# Patient Record
Sex: MALE
[De-identification: names, ages, dates, MRNs, and addresses within clinical notes are randomized per-mention and may not be internally consistent; named-entity substitution may affect disease eponyms.]

---

## 2019-02-05 ENCOUNTER — HOSPITAL ENCOUNTER (OUTPATIENT)
Dept: HOSPITAL 88 - CT | Age: 74
End: 2019-02-05
Attending: NURSE PRACTITIONER
Payer: COMMERCIAL

## 2019-02-05 DIAGNOSIS — J34.2: ICD-10-CM

## 2019-02-05 DIAGNOSIS — J32.0: Primary | ICD-10-CM

## 2019-02-05 PROCEDURE — 70486 CT MAXILLOFACIAL W/O DYE: CPT

## 2019-02-05 NOTE — DIAGNOSTIC IMAGING REPORT
History: Maxillary sinusitis

Comparison studies: None



Technique: 

Axial images were obtained through the paranasal sinuses.

Coronal and sagittal images reconstructed from the axial data.

Radiation dose: Total DLP: 295 mGy*cm. Estimated effective dose: DLP x 0.015 

Intravenous contrast: None



Findings:



Right anterior complex:

Frontal sinus: Clear aside from mild mucosal thickening in the anteroinferior

sinus.

Frontonasal recess: Patent.

Anterior ethmoid air cells: Clear.

Ostiomeatal unit: Maxillary ostium is narrowed by mucosal thickening but is

patent. The ethmoid infundibulum and hiatus semilunaris are likewise patent.

Maxillary sinus: Partially opacified with peripheral mucosal thickening,

greatest along the alveolar recess.



Left anterior complex:

Frontal sinus: Clear.

Frontonasal recess: Patent.

Anterior ethmoid air cells: Mild mucosal thickening otherwise clear.

Ostiomeatal unit: Maxillary ostium narrowed by mucosal thickening but patent.

The ethmoid infundibulum and hiatus semilunaris are likely patent.

Maxillary sinus: Peripheral mucosal thickening predominantly along the alveolar

recess.



Posterior complex:

Left sphenoid sinus: Clear.

Right sphenoid sinus: Contains small nonspecific frothy secretions or debris. 

Clear. Sphenoethmoidal recesses: Clear.

Posterior ethmoid air cells: Clear.





Other: 

Nasal vestibule and cavity: Patent

Nasal septum: Deviated to the patient's right.

Agger Nasi: Clear bilaterally.

Turbinates:  Non-aerated bilaterally.

Manny cells: None 

Lamina papyracea: Intact.

Cribriform plates: Approximate 3 to 4 mm on the right and 4 to 5 mm on the left

below the level of the fovea ethmoidalis.

Olfactory recesses: Clear

Optic canals: Not dehiscent

Onodi cells: None

Internal carotid canals: Not dehiscent. Both carotid canal slightly bulge into

the left sphenoid sinus with air covered by thin bony plates.

Sphenoid sinuses: Asymmetric with dominant left sinus. The lateral recesses are

not aerated. The septum inserts to the right of midline and inserts along the

anterior wall the right cavernous carotid canal.



Orbits: No abnormalities.

Bones: No fractures or lytic or blastic lesions.

Temporal bones: No gross abnormalities.



Dentition: Edentulous maxilla. Multiple absent mandibular teeth with mandibular

dental caries



Included brain: Mild generalized cerebral volume loss with nonspecific

periventricular hypodensities which may reflect chronic microvascular ischemic

changes. Scattered calcified atherosclerosis in the carotid siphons, and

dominant right intradural vertebral artery and in the basilar artery with mild

vertebrobasilar dolichoectasia.



Incidental findings: 

Lens replacements for previous cataract surgery.

Calcified atherosclerosis in the proximal left cervical internal carotid

artery.



IMPRESSION:



1.  Nonspecific inflammatory mucosal thickening in the maxillary sinuses, right

inferior frontal sinus and left anterior ethmoids with small nonspecific debris

or secretions in the right sphenoid sinus.

2.  Sinus drainage pathways are patent.

3.  Rightward nasal septal deviation.



Signed by: Dr. Erick Victoria M.D. on 2/5/2019 3:02 PM

## 2019-11-25 ENCOUNTER — HOSPITAL ENCOUNTER (OUTPATIENT)
Dept: HOSPITAL 88 - PT | Age: 74
LOS: 5 days | End: 2019-11-30
Attending: INTERNAL MEDICINE
Payer: MEDICARE

## 2019-11-25 DIAGNOSIS — I69.898: Primary | ICD-10-CM

## 2019-11-25 DIAGNOSIS — R26.89: ICD-10-CM

## 2020-09-23 ENCOUNTER — HOSPITAL ENCOUNTER (INPATIENT)
Dept: HOSPITAL 88 - ER | Age: 75
LOS: 5 days | Discharge: HOME HEALTH | DRG: 643 | End: 2020-09-28
Attending: INTERNAL MEDICINE | Admitting: INTERNAL MEDICINE
Payer: MEDICARE

## 2020-09-23 VITALS — HEIGHT: 67 IN | BODY MASS INDEX: 28.25 KG/M2 | WEIGHT: 180 LBS

## 2020-09-23 VITALS — DIASTOLIC BLOOD PRESSURE: 90 MMHG | SYSTOLIC BLOOD PRESSURE: 183 MMHG

## 2020-09-23 VITALS — SYSTOLIC BLOOD PRESSURE: 183 MMHG | DIASTOLIC BLOOD PRESSURE: 90 MMHG

## 2020-09-23 DIAGNOSIS — J84.10: ICD-10-CM
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
## 2020-09-23 DIAGNOSIS — S06.0X0A: ICD-10-CM

## 2020-09-23 DIAGNOSIS — I10: ICD-10-CM

## 2020-09-23 DIAGNOSIS — F01.50: ICD-10-CM
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
## 2020-09-23 DIAGNOSIS — S00.83XA: ICD-10-CM

## 2020-09-23 DIAGNOSIS — M25.561: ICD-10-CM

## 2020-09-23 DIAGNOSIS — S00.81XA: ICD-10-CM

## 2020-09-23 DIAGNOSIS — M25.531: ICD-10-CM

## 2020-09-23 DIAGNOSIS — M25.562: ICD-10-CM

## 2020-09-23 DIAGNOSIS — E22.2: Primary | ICD-10-CM

## 2020-09-23 DIAGNOSIS — J18.9: ICD-10-CM

## 2020-09-23 LAB
ALBUMIN SERPL-MCNC: 4.4 G/DL (ref 3.5–5)
ALBUMIN/GLOB SERPL: 1.5 {RATIO} (ref 0.8–2)
ALP SERPL-CCNC: 68 IU/L (ref 40–150)
ALT SERPL-CCNC: 9 IU/L (ref 0–55)
ANION GAP SERPL CALC-SCNC: 12.5 MMOL/L (ref 8–16)
BACTERIA URNS QL MICRO: (no result) /HPF
BASOPHILS # BLD AUTO: 0.1 10*3/UL (ref 0–0.1)
BASOPHILS NFR BLD AUTO: 1.3 % (ref 0–1)
BILIRUB UR QL: NEGATIVE
BUN SERPL-MCNC: 9 MG/DL (ref 7–26)
BUN/CREAT SERPL: 12 (ref 6–25)
CALCIUM SERPL-MCNC: 8.3 MG/DL (ref 8.4–10.2)
CHLORIDE SERPL-SCNC: 92 MMOL/L (ref 98–107)
CK MB SERPL-MCNC: 4.7 NG/ML (ref 0–5)
CK SERPL-CCNC: 270 IU/L (ref 30–200)
CLARITY UR: (no result)
CO2 SERPL-SCNC: 22 MMOL/L (ref 22–29)
COLOR UR: YELLOW
DEPRECATED NEUTROPHILS # BLD AUTO: 3.2 10*3/UL (ref 2.1–6.9)
DEPRECATED RBC URNS MANUAL-ACNC: (no result) /HPF (ref 0–5)
EGFRCR SERPLBLD CKD-EPI 2021: > 60 ML/MIN (ref 60–?)
EOSINOPHIL # BLD AUTO: 0.1 10*3/UL (ref 0–0.4)
EOSINOPHIL NFR BLD AUTO: 2.5 % (ref 0–6)
EPI CELLS URNS QL MICRO: (no result) /LPF
ERYTHROCYTE [DISTWIDTH] IN CORD BLOOD: 12.1 % (ref 11.7–14.4)
GLOBULIN PLAS-MCNC: 3 G/DL (ref 2.3–3.5)
GLUCOSE SERPLBLD-MCNC: 102 MG/DL (ref 74–118)
HCT VFR BLD AUTO: 35.8 % (ref 38.2–49.6)
HGB BLD-MCNC: 12.5 G/DL (ref 14–18)
KETONES UR QL STRIP.AUTO: NEGATIVE
LEUKOCYTE ESTERASE UR QL STRIP.AUTO: NEGATIVE
LYMPHOCYTES # BLD: 1.2 10*3/UL (ref 1–3.2)
LYMPHOCYTES NFR BLD AUTO: 23.2 % (ref 18–39.1)
MCH RBC QN AUTO: 30.6 PG (ref 28–32)
MCHC RBC AUTO-ENTMCNC: 34.9 G/DL (ref 31–35)
MCV RBC AUTO: 87.7 FL (ref 81–99)
MONOCYTES # BLD AUTO: 0.6 10*3/UL (ref 0.2–0.8)
MONOCYTES NFR BLD AUTO: 11.1 % (ref 4.4–11.3)
NEUTS SEG NFR BLD AUTO: 61.7 % (ref 38.7–80)
NITRITE UR QL STRIP.AUTO: NEGATIVE
PLATELET # BLD AUTO: 234 X10E3/UL (ref 140–360)
POTASSIUM SERPL-SCNC: 4.5 MMOL/L (ref 3.5–5.1)
PROT UR QL STRIP.AUTO: >=300
RBC # BLD AUTO: 4.08 X10E6/UL (ref 4.3–5.7)
SODIUM SERPL-SCNC: 122 MMOL/L (ref 136–145)
SP GR UR STRIP: 1.02 (ref 1.01–1.02)
UROBILINOGEN UR STRIP-MCNC: 0.2 MG/DL (ref 0.2–1)
WBC #/AREA URNS HPF: (no result) /HPF (ref 0–5)

## 2020-09-23 PROCEDURE — 82550 ASSAY OF CK (CPK): CPT

## 2020-09-23 PROCEDURE — 84520 ASSAY OF UREA NITROGEN: CPT

## 2020-09-23 PROCEDURE — 83880 ASSAY OF NATRIURETIC PEPTIDE: CPT

## 2020-09-23 PROCEDURE — 94640 AIRWAY INHALATION TREATMENT: CPT

## 2020-09-23 PROCEDURE — 82607 VITAMIN B-12: CPT

## 2020-09-23 PROCEDURE — 86001 ALLERGEN SPECIFIC IGG: CPT

## 2020-09-23 PROCEDURE — 99285 EMERGENCY DEPT VISIT HI MDM: CPT

## 2020-09-23 PROCEDURE — 80320 DRUG SCREEN QUANTALCOHOLS: CPT

## 2020-09-23 PROCEDURE — 70486 CT MAXILLOFACIAL W/O DYE: CPT

## 2020-09-23 PROCEDURE — 84436 ASSAY OF TOTAL THYROXINE: CPT

## 2020-09-23 PROCEDURE — 71250 CT THORAX DX C-: CPT

## 2020-09-23 PROCEDURE — 84300 ASSAY OF URINE SODIUM: CPT

## 2020-09-23 PROCEDURE — 93005 ELECTROCARDIOGRAM TRACING: CPT

## 2020-09-23 PROCEDURE — 83935 ASSAY OF URINE OSMOLALITY: CPT

## 2020-09-23 PROCEDURE — 84443 ASSAY THYROID STIM HORMONE: CPT

## 2020-09-23 PROCEDURE — 70450 CT HEAD/BRAIN W/O DYE: CPT

## 2020-09-23 PROCEDURE — 72125 CT NECK SPINE W/O DYE: CPT

## 2020-09-23 PROCEDURE — 86592 SYPHILIS TEST NON-TREP QUAL: CPT

## 2020-09-23 PROCEDURE — 82947 ASSAY GLUCOSE BLOOD QUANT: CPT

## 2020-09-23 PROCEDURE — 85025 COMPLETE CBC W/AUTO DIFF WBC: CPT

## 2020-09-23 PROCEDURE — 36415 COLL VENOUS BLD VENIPUNCTURE: CPT

## 2020-09-23 PROCEDURE — 82553 CREATINE MB FRACTION: CPT

## 2020-09-23 PROCEDURE — 84484 ASSAY OF TROPONIN QUANT: CPT

## 2020-09-23 PROCEDURE — 86003 ALLG SPEC IGE CRUDE XTRC EA: CPT

## 2020-09-23 PROCEDURE — 84479 ASSAY OF THYROID (T3 OR T4): CPT

## 2020-09-23 PROCEDURE — 81001 URINALYSIS AUTO W/SCOPE: CPT

## 2020-09-23 PROCEDURE — 97139 UNLISTED THERAPEUTIC PX: CPT

## 2020-09-23 PROCEDURE — 71046 X-RAY EXAM CHEST 2 VIEWS: CPT

## 2020-09-23 PROCEDURE — 80048 BASIC METABOLIC PNL TOTAL CA: CPT

## 2020-09-23 PROCEDURE — 80053 COMPREHEN METABOLIC PANEL: CPT

## 2020-09-23 PROCEDURE — 82746 ASSAY OF FOLIC ACID SERUM: CPT

## 2020-09-23 PROCEDURE — 86039 ANTINUCLEAR ANTIBODIES (ANA): CPT

## 2020-09-23 PROCEDURE — 84295 ASSAY OF SERUM SODIUM: CPT

## 2020-09-23 RX ADMIN — SODIUM CHLORIDE SCH MLS/HR: 9 INJECTION, SOLUTION INTRAVENOUS at 22:36

## 2020-09-24 VITALS — SYSTOLIC BLOOD PRESSURE: 151 MMHG | DIASTOLIC BLOOD PRESSURE: 79 MMHG

## 2020-09-24 VITALS — SYSTOLIC BLOOD PRESSURE: 149 MMHG | DIASTOLIC BLOOD PRESSURE: 73 MMHG

## 2020-09-24 VITALS — SYSTOLIC BLOOD PRESSURE: 139 MMHG | DIASTOLIC BLOOD PRESSURE: 72 MMHG

## 2020-09-24 VITALS — SYSTOLIC BLOOD PRESSURE: 148 MMHG | DIASTOLIC BLOOD PRESSURE: 63 MMHG

## 2020-09-24 VITALS — SYSTOLIC BLOOD PRESSURE: 108 MMHG | DIASTOLIC BLOOD PRESSURE: 50 MMHG

## 2020-09-24 VITALS — DIASTOLIC BLOOD PRESSURE: 61 MMHG | SYSTOLIC BLOOD PRESSURE: 141 MMHG

## 2020-09-24 VITALS — SYSTOLIC BLOOD PRESSURE: 141 MMHG | DIASTOLIC BLOOD PRESSURE: 61 MMHG

## 2020-09-24 LAB
ALBUMIN SERPL-MCNC: 3.7 G/DL (ref 3.5–5)
ALBUMIN/GLOB SERPL: 1.5 {RATIO} (ref 0.8–2)
ALP SERPL-CCNC: 57 IU/L (ref 40–150)
ALT SERPL-CCNC: 7 IU/L (ref 0–55)
ANION GAP SERPL CALC-SCNC: 12 MMOL/L (ref 8–16)
BASOPHILS # BLD AUTO: 0.1 10*3/UL (ref 0–0.1)
BASOPHILS NFR BLD AUTO: 1.2 % (ref 0–1)
BUN SERPL-MCNC: 6 MG/DL (ref 7–26)
BUN SERPL-MCNC: 7 MG/DL (ref 7–26)
BUN/CREAT SERPL: 10 (ref 6–25)
CALCIUM SERPL-MCNC: 7.5 MG/DL (ref 8.4–10.2)
CHLORIDE SERPL-SCNC: 97 MMOL/L (ref 98–107)
CK MB SERPL-MCNC: 4.1 NG/ML (ref 0–5)
CK MB SERPL-MCNC: 4.7 NG/ML (ref 0–5)
CK SERPL-CCNC: 195 IU/L (ref 30–200)
CK SERPL-CCNC: 219 IU/L (ref 30–200)
CO2 SERPL-SCNC: 21 MMOL/L (ref 22–29)
DEPRECATED NEUTROPHILS # BLD AUTO: 3 10*3/UL (ref 2.1–6.9)
EGFRCR SERPLBLD CKD-EPI 2021: > 60 ML/MIN (ref 60–?)
EOSINOPHIL # BLD AUTO: 0.1 10*3/UL (ref 0–0.4)
EOSINOPHIL NFR BLD AUTO: 2 % (ref 0–6)
ERYTHROCYTE [DISTWIDTH] IN CORD BLOOD: 12.1 % (ref 11.7–14.4)
GLOBULIN PLAS-MCNC: 2.4 G/DL (ref 2.3–3.5)
GLUCOSE SERPLBLD-MCNC: 86 MG/DL (ref 74–118)
GLUCOSE SERPLBLD-MCNC: 90 MG/DL (ref 74–118)
HCT VFR BLD AUTO: 33.6 % (ref 38.2–49.6)
HGB BLD-MCNC: 11.7 G/DL (ref 14–18)
LYMPHOCYTES # BLD: 1.3 10*3/UL (ref 1–3.2)
LYMPHOCYTES NFR BLD AUTO: 24.9 % (ref 18–39.1)
MCH RBC QN AUTO: 31.2 PG (ref 28–32)
MCHC RBC AUTO-ENTMCNC: 34.8 G/DL (ref 31–35)
MCV RBC AUTO: 89.6 FL (ref 81–99)
MONOCYTES # BLD AUTO: 0.7 10*3/UL (ref 0.2–0.8)
MONOCYTES NFR BLD AUTO: 12.8 % (ref 4.4–11.3)
NEUTS SEG NFR BLD AUTO: 58.7 % (ref 38.7–80)
OSMOLALITY SERPL CALC.SUM OF ELEC: 251 MOSM/KG (ref 278–305)
PLATELET # BLD AUTO: 212 X10E3/UL (ref 140–360)
POTASSIUM SERPL-SCNC: 4 MMOL/L (ref 3.5–5.1)
RBC # BLD AUTO: 3.75 X10E6/UL (ref 4.3–5.7)
SODIUM SERPL-SCNC: 126 MMOL/L (ref 136–145)
SODIUM SERPL-SCNC: 126 MMOL/L (ref 136–145)

## 2020-09-24 RX ADMIN — AZITHROMYCIN SCH MLS/HR: 100 INJECTION, POWDER, LYOPHILIZED, FOR SOLUTION INTRAVENOUS at 20:44

## 2020-09-24 RX ADMIN — LISINOPRIL SCH MG: 20 TABLET ORAL at 12:44

## 2020-09-24 RX ADMIN — LISINOPRIL SCH MG: 20 TABLET ORAL at 20:44

## 2020-09-24 RX ADMIN — CEFTRIAXONE SCH MLS/HR: 100 INJECTION, POWDER, FOR SOLUTION INTRAVENOUS at 12:43

## 2020-09-24 RX ADMIN — AMLODIPINE BESYLATE SCH MG: 5 TABLET ORAL at 12:44

## 2020-09-24 RX ADMIN — SODIUM CHLORIDE SCH MLS/HR: 9 INJECTION, SOLUTION INTRAVENOUS at 12:57

## 2020-09-24 RX ADMIN — SODIUM CHLORIDE SCH MLS/HR: 9 INJECTION, SOLUTION INTRAVENOUS at 20:44

## 2020-09-24 RX ADMIN — SODIUM CHLORIDE SCH MLS/HR: 9 INJECTION, SOLUTION INTRAVENOUS at 04:40

## 2020-09-25 VITALS — DIASTOLIC BLOOD PRESSURE: 75 MMHG | SYSTOLIC BLOOD PRESSURE: 178 MMHG

## 2020-09-25 VITALS — SYSTOLIC BLOOD PRESSURE: 116 MMHG | DIASTOLIC BLOOD PRESSURE: 58 MMHG

## 2020-09-25 VITALS — DIASTOLIC BLOOD PRESSURE: 61 MMHG | SYSTOLIC BLOOD PRESSURE: 177 MMHG

## 2020-09-25 VITALS — SYSTOLIC BLOOD PRESSURE: 177 MMHG | DIASTOLIC BLOOD PRESSURE: 61 MMHG

## 2020-09-25 VITALS — SYSTOLIC BLOOD PRESSURE: 145 MMHG | DIASTOLIC BLOOD PRESSURE: 71 MMHG

## 2020-09-25 VITALS — SYSTOLIC BLOOD PRESSURE: 178 MMHG | DIASTOLIC BLOOD PRESSURE: 75 MMHG

## 2020-09-25 VITALS — DIASTOLIC BLOOD PRESSURE: 84 MMHG | SYSTOLIC BLOOD PRESSURE: 170 MMHG

## 2020-09-25 VITALS — SYSTOLIC BLOOD PRESSURE: 127 MMHG | DIASTOLIC BLOOD PRESSURE: 61 MMHG

## 2020-09-25 LAB
ANION GAP SERPL CALC-SCNC: 13.1 MMOL/L (ref 8–16)
BASOPHILS # BLD AUTO: 0.1 10*3/UL (ref 0–0.1)
BASOPHILS NFR BLD AUTO: 1.5 % (ref 0–1)
BUN SERPL-MCNC: 6 MG/DL (ref 7–26)
BUN/CREAT SERPL: 9 (ref 6–25)
CALCIUM SERPL-MCNC: 7.7 MG/DL (ref 8.4–10.2)
CHLORIDE SERPL-SCNC: 104 MMOL/L (ref 98–107)
CO2 SERPL-SCNC: 21 MMOL/L (ref 22–29)
DEPRECATED FTI SERPL-MCNC: 1.79 UG/DL (ref 1.4–3.8)
DEPRECATED NEUTROPHILS # BLD AUTO: 2.6 10*3/UL (ref 2.1–6.9)
EGFRCR SERPLBLD CKD-EPI 2021: > 60 ML/MIN (ref 60–?)
EOSINOPHIL # BLD AUTO: 0.2 10*3/UL (ref 0–0.4)
EOSINOPHIL NFR BLD AUTO: 4 % (ref 0–6)
ERYTHROCYTE [DISTWIDTH] IN CORD BLOOD: 12.7 % (ref 11.7–14.4)
GLUCOSE SERPLBLD-MCNC: 81 MG/DL (ref 74–118)
HCT VFR BLD AUTO: 33.9 % (ref 38.2–49.6)
HGB BLD-MCNC: 11.7 G/DL (ref 14–18)
LYMPHOCYTES # BLD: 1.3 10*3/UL (ref 1–3.2)
LYMPHOCYTES NFR BLD AUTO: 26.5 % (ref 18–39.1)
MCH RBC QN AUTO: 31.7 PG (ref 28–32)
MCHC RBC AUTO-ENTMCNC: 34.5 G/DL (ref 31–35)
MCV RBC AUTO: 91.9 FL (ref 81–99)
MONOCYTES # BLD AUTO: 0.6 10*3/UL (ref 0.2–0.8)
MONOCYTES NFR BLD AUTO: 13.4 % (ref 4.4–11.3)
NEUTS SEG NFR BLD AUTO: 54.2 % (ref 38.7–80)
PLATELET # BLD AUTO: 205 X10E3/UL (ref 140–360)
POTASSIUM SERPL-SCNC: 4.1 MMOL/L (ref 3.5–5.1)
RBC # BLD AUTO: 3.69 X10E6/UL (ref 4.3–5.7)
SODIUM SERPL-SCNC: 134 MMOL/L (ref 136–145)
TSH SERPL DL<=0.005 MIU/L-ACNC: 0.4 UIU/ML (ref 0.35–4.94)

## 2020-09-25 RX ADMIN — METHYLPREDNISOLONE SODIUM SUCCINATE SCH MG: 40 INJECTION, POWDER, LYOPHILIZED, FOR SOLUTION INTRAMUSCULAR; INTRAVENOUS at 20:30

## 2020-09-25 RX ADMIN — AZITHROMYCIN SCH MLS/HR: 100 INJECTION, POWDER, LYOPHILIZED, FOR SOLUTION INTRAVENOUS at 20:30

## 2020-09-25 RX ADMIN — IPRATROPIUM BROMIDE AND ALBUTEROL SULFATE SCH ML: .5; 2.5 SOLUTION RESPIRATORY (INHALATION) at 20:25

## 2020-09-25 RX ADMIN — METHYLPREDNISOLONE SODIUM SUCCINATE SCH MG: 40 INJECTION, POWDER, LYOPHILIZED, FOR SOLUTION INTRAMUSCULAR; INTRAVENOUS at 11:03

## 2020-09-25 RX ADMIN — LISINOPRIL SCH MG: 20 TABLET ORAL at 20:31

## 2020-09-25 RX ADMIN — LISINOPRIL SCH MG: 20 TABLET ORAL at 08:43

## 2020-09-25 RX ADMIN — SODIUM CHLORIDE SCH MLS/HR: 9 INJECTION, SOLUTION INTRAVENOUS at 17:00

## 2020-09-25 RX ADMIN — IPRATROPIUM BROMIDE AND ALBUTEROL SULFATE SCH ML: .5; 2.5 SOLUTION RESPIRATORY (INHALATION) at 13:30

## 2020-09-25 RX ADMIN — CEFTRIAXONE SCH MLS/HR: 100 INJECTION, POWDER, FOR SOLUTION INTRAVENOUS at 11:03

## 2020-09-25 RX ADMIN — AMLODIPINE BESYLATE SCH MG: 5 TABLET ORAL at 08:43

## 2020-09-26 VITALS — DIASTOLIC BLOOD PRESSURE: 82 MMHG | SYSTOLIC BLOOD PRESSURE: 169 MMHG

## 2020-09-26 VITALS — DIASTOLIC BLOOD PRESSURE: 81 MMHG | SYSTOLIC BLOOD PRESSURE: 182 MMHG

## 2020-09-26 VITALS — SYSTOLIC BLOOD PRESSURE: 147 MMHG | DIASTOLIC BLOOD PRESSURE: 67 MMHG

## 2020-09-26 VITALS — SYSTOLIC BLOOD PRESSURE: 163 MMHG | DIASTOLIC BLOOD PRESSURE: 71 MMHG

## 2020-09-26 VITALS — SYSTOLIC BLOOD PRESSURE: 169 MMHG | DIASTOLIC BLOOD PRESSURE: 82 MMHG

## 2020-09-26 VITALS — DIASTOLIC BLOOD PRESSURE: 63 MMHG | SYSTOLIC BLOOD PRESSURE: 155 MMHG

## 2020-09-26 VITALS — DIASTOLIC BLOOD PRESSURE: 72 MMHG | SYSTOLIC BLOOD PRESSURE: 163 MMHG

## 2020-09-26 VITALS — SYSTOLIC BLOOD PRESSURE: 182 MMHG | DIASTOLIC BLOOD PRESSURE: 81 MMHG

## 2020-09-26 RX ADMIN — METHYLPREDNISOLONE SODIUM SUCCINATE SCH MG: 40 INJECTION, POWDER, LYOPHILIZED, FOR SOLUTION INTRAMUSCULAR; INTRAVENOUS at 08:05

## 2020-09-26 RX ADMIN — AMLODIPINE BESYLATE SCH MG: 5 TABLET ORAL at 08:05

## 2020-09-26 RX ADMIN — IPRATROPIUM BROMIDE AND ALBUTEROL SULFATE SCH ML: .5; 2.5 SOLUTION RESPIRATORY (INHALATION) at 07:20

## 2020-09-26 RX ADMIN — IPRATROPIUM BROMIDE AND ALBUTEROL SULFATE SCH ML: .5; 2.5 SOLUTION RESPIRATORY (INHALATION) at 13:00

## 2020-09-26 RX ADMIN — LISINOPRIL SCH MG: 20 TABLET ORAL at 20:52

## 2020-09-26 RX ADMIN — LISINOPRIL SCH MG: 20 TABLET ORAL at 08:05

## 2020-09-26 RX ADMIN — METHYLPREDNISOLONE SODIUM SUCCINATE SCH MG: 40 INJECTION, POWDER, LYOPHILIZED, FOR SOLUTION INTRAMUSCULAR; INTRAVENOUS at 20:52

## 2020-09-26 RX ADMIN — IPRATROPIUM BROMIDE AND ALBUTEROL SULFATE SCH ML: .5; 2.5 SOLUTION RESPIRATORY (INHALATION) at 01:20

## 2020-09-26 RX ADMIN — Medication PRN MG: at 18:36

## 2020-09-26 RX ADMIN — SODIUM CHLORIDE SCH MLS/HR: 9 INJECTION, SOLUTION INTRAVENOUS at 08:05

## 2020-09-26 RX ADMIN — IPRATROPIUM BROMIDE AND ALBUTEROL SULFATE SCH ML: .5; 2.5 SOLUTION RESPIRATORY (INHALATION) at 18:57

## 2020-09-26 RX ADMIN — CEFTRIAXONE SCH MLS/HR: 100 INJECTION, POWDER, FOR SOLUTION INTRAVENOUS at 12:15

## 2020-09-26 RX ADMIN — AZITHROMYCIN SCH MLS/HR: 100 INJECTION, POWDER, LYOPHILIZED, FOR SOLUTION INTRAVENOUS at 20:51

## 2020-09-27 VITALS — SYSTOLIC BLOOD PRESSURE: 161 MMHG | DIASTOLIC BLOOD PRESSURE: 73 MMHG

## 2020-09-27 VITALS — DIASTOLIC BLOOD PRESSURE: 77 MMHG | SYSTOLIC BLOOD PRESSURE: 159 MMHG

## 2020-09-27 VITALS — SYSTOLIC BLOOD PRESSURE: 149 MMHG | DIASTOLIC BLOOD PRESSURE: 66 MMHG

## 2020-09-27 VITALS — DIASTOLIC BLOOD PRESSURE: 58 MMHG | SYSTOLIC BLOOD PRESSURE: 133 MMHG

## 2020-09-27 VITALS — DIASTOLIC BLOOD PRESSURE: 71 MMHG | SYSTOLIC BLOOD PRESSURE: 148 MMHG

## 2020-09-27 VITALS — SYSTOLIC BLOOD PRESSURE: 148 MMHG | DIASTOLIC BLOOD PRESSURE: 71 MMHG

## 2020-09-27 VITALS — DIASTOLIC BLOOD PRESSURE: 76 MMHG | SYSTOLIC BLOOD PRESSURE: 146 MMHG

## 2020-09-27 VITALS — DIASTOLIC BLOOD PRESSURE: 63 MMHG | SYSTOLIC BLOOD PRESSURE: 126 MMHG

## 2020-09-27 LAB
ANION GAP SERPL CALC-SCNC: 11.5 MMOL/L (ref 8–16)
BASOPHILS # BLD AUTO: 0 10*3/UL (ref 0–0.1)
BASOPHILS NFR BLD AUTO: 0.4 % (ref 0–1)
BUN SERPL-MCNC: 9 MG/DL (ref 7–26)
BUN/CREAT SERPL: 13 (ref 6–25)
CALCIUM SERPL-MCNC: 8.9 MG/DL (ref 8.4–10.2)
CHLORIDE SERPL-SCNC: 101 MMOL/L (ref 98–107)
CO2 SERPL-SCNC: 26 MMOL/L (ref 22–29)
DEPRECATED NEUTROPHILS # BLD AUTO: 4 10*3/UL (ref 2.1–6.9)
EGFRCR SERPLBLD CKD-EPI 2021: > 60 ML/MIN (ref 60–?)
EOSINOPHIL # BLD AUTO: 0 10*3/UL (ref 0–0.4)
EOSINOPHIL NFR BLD AUTO: 0 % (ref 0–6)
ERYTHROCYTE [DISTWIDTH] IN CORD BLOOD: 12.8 % (ref 11.7–14.4)
GLUCOSE SERPLBLD-MCNC: 121 MG/DL (ref 74–118)
HCT VFR BLD AUTO: 35.9 % (ref 38.2–49.6)
HGB BLD-MCNC: 12.7 G/DL (ref 14–18)
LYMPHOCYTES # BLD: 0.6 10*3/UL (ref 1–3.2)
LYMPHOCYTES NFR BLD AUTO: 12.6 % (ref 18–39.1)
MCH RBC QN AUTO: 32.6 PG (ref 28–32)
MCHC RBC AUTO-ENTMCNC: 35.4 G/DL (ref 31–35)
MCV RBC AUTO: 92.3 FL (ref 81–99)
MONOCYTES # BLD AUTO: 0.2 10*3/UL (ref 0.2–0.8)
MONOCYTES NFR BLD AUTO: 3.5 % (ref 4.4–11.3)
NEUTS SEG NFR BLD AUTO: 83.3 % (ref 38.7–80)
PLATELET # BLD AUTO: 230 X10E3/UL (ref 140–360)
POTASSIUM SERPL-SCNC: 4.5 MMOL/L (ref 3.5–5.1)
RBC # BLD AUTO: 3.89 X10E6/UL (ref 4.3–5.7)
SODIUM SERPL-SCNC: 134 MMOL/L (ref 136–145)

## 2020-09-27 RX ADMIN — METHYLPREDNISOLONE SODIUM SUCCINATE SCH MG: 40 INJECTION, POWDER, LYOPHILIZED, FOR SOLUTION INTRAMUSCULAR; INTRAVENOUS at 09:18

## 2020-09-27 RX ADMIN — CEFTRIAXONE SCH MLS/HR: 100 INJECTION, POWDER, FOR SOLUTION INTRAVENOUS at 10:47

## 2020-09-27 RX ADMIN — AZITHROMYCIN SCH MLS/HR: 100 INJECTION, POWDER, LYOPHILIZED, FOR SOLUTION INTRAVENOUS at 19:57

## 2020-09-27 RX ADMIN — Medication PRN MG: at 18:03

## 2020-09-27 RX ADMIN — Medication SCH MG: at 06:11

## 2020-09-27 RX ADMIN — IPRATROPIUM BROMIDE AND ALBUTEROL SULFATE SCH ML: .5; 2.5 SOLUTION RESPIRATORY (INHALATION) at 13:02

## 2020-09-27 RX ADMIN — LISINOPRIL SCH MG: 20 TABLET ORAL at 20:01

## 2020-09-27 RX ADMIN — IPRATROPIUM BROMIDE AND ALBUTEROL SULFATE SCH ML: .5; 2.5 SOLUTION RESPIRATORY (INHALATION) at 06:55

## 2020-09-27 RX ADMIN — LISINOPRIL SCH MG: 20 TABLET ORAL at 10:40

## 2020-09-27 RX ADMIN — IPRATROPIUM BROMIDE AND ALBUTEROL SULFATE SCH ML: .5; 2.5 SOLUTION RESPIRATORY (INHALATION) at 01:00

## 2020-09-27 RX ADMIN — Medication SCH MG: at 09:17

## 2020-09-27 RX ADMIN — Medication SCH MG: at 06:00

## 2020-09-27 RX ADMIN — IPRATROPIUM BROMIDE AND ALBUTEROL SULFATE SCH ML: .5; 2.5 SOLUTION RESPIRATORY (INHALATION) at 18:45

## 2020-09-28 VITALS — DIASTOLIC BLOOD PRESSURE: 70 MMHG | SYSTOLIC BLOOD PRESSURE: 155 MMHG

## 2020-09-28 VITALS — DIASTOLIC BLOOD PRESSURE: 93 MMHG | SYSTOLIC BLOOD PRESSURE: 158 MMHG

## 2020-09-28 VITALS — SYSTOLIC BLOOD PRESSURE: 154 MMHG | DIASTOLIC BLOOD PRESSURE: 72 MMHG

## 2020-09-28 LAB
ANION GAP SERPL CALC-SCNC: 11.7 MMOL/L (ref 8–16)
BUN SERPL-MCNC: 11 MG/DL (ref 7–26)
BUN/CREAT SERPL: 16 (ref 6–25)
CALCIUM SERPL-MCNC: 8.8 MG/DL (ref 8.4–10.2)
CHLORIDE SERPL-SCNC: 98 MMOL/L (ref 98–107)
CO2 SERPL-SCNC: 26 MMOL/L (ref 22–29)
EGFRCR SERPLBLD CKD-EPI 2021: > 60 ML/MIN (ref 60–?)
GLUCOSE SERPLBLD-MCNC: 92 MG/DL (ref 74–118)
POTASSIUM SERPL-SCNC: 3.7 MMOL/L (ref 3.5–5.1)
SODIUM SERPL-SCNC: 132 MMOL/L (ref 136–145)

## 2020-09-28 RX ADMIN — LISINOPRIL SCH MG: 20 TABLET ORAL at 08:17

## 2020-09-28 RX ADMIN — IPRATROPIUM BROMIDE AND ALBUTEROL SULFATE SCH ML: .5; 2.5 SOLUTION RESPIRATORY (INHALATION) at 08:50

## 2020-09-28 RX ADMIN — Medication SCH MG: at 08:18

## 2020-09-28 RX ADMIN — IPRATROPIUM BROMIDE AND ALBUTEROL SULFATE SCH ML: .5; 2.5 SOLUTION RESPIRATORY (INHALATION) at 01:00

## 2020-10-17 ENCOUNTER — HOSPITAL ENCOUNTER (INPATIENT)
Dept: HOSPITAL 88 - ER | Age: 75
LOS: 6 days | Discharge: NURSING, REHAB OR LONG-TERM CARE | DRG: 193 | End: 2020-10-23
Attending: INTERNAL MEDICINE | Admitting: INTERNAL MEDICINE
Payer: MEDICARE

## 2020-10-17 VITALS — HEIGHT: 67 IN | BODY MASS INDEX: 24.8 KG/M2 | WEIGHT: 158 LBS

## 2020-10-17 DIAGNOSIS — D50.0: ICD-10-CM

## 2020-10-17 DIAGNOSIS — R53.1: ICD-10-CM

## 2020-10-17 DIAGNOSIS — B95.2: ICD-10-CM

## 2020-10-17 DIAGNOSIS — E78.5: ICD-10-CM

## 2020-10-17 DIAGNOSIS — E22.2: ICD-10-CM

## 2020-10-17 DIAGNOSIS — R33.9: ICD-10-CM

## 2020-10-17 DIAGNOSIS — Z11.59: ICD-10-CM

## 2020-10-17 DIAGNOSIS — J84.10: ICD-10-CM

## 2020-10-17 DIAGNOSIS — I10: ICD-10-CM

## 2020-10-17 DIAGNOSIS — G93.41: ICD-10-CM

## 2020-10-17 DIAGNOSIS — N39.0: ICD-10-CM

## 2020-10-17 DIAGNOSIS — J18.9: Primary | ICD-10-CM

## 2020-10-17 LAB
ALBUMIN SERPL-MCNC: 3.8 G/DL (ref 3.5–5)
ALBUMIN/GLOB SERPL: 1.2 {RATIO} (ref 0.8–2)
ALP SERPL-CCNC: 70 IU/L (ref 40–150)
ALT SERPL-CCNC: 16 IU/L (ref 0–55)
ANION GAP SERPL CALC-SCNC: 13.5 MMOL/L (ref 8–16)
BACTERIA URNS QL MICRO: (no result) /HPF
BASOPHILS # BLD AUTO: 0.1 10*3/UL (ref 0–0.1)
BASOPHILS NFR BLD AUTO: 1.4 % (ref 0–1)
BILIRUB UR QL: NEGATIVE
BUN SERPL-MCNC: 8 MG/DL (ref 7–26)
BUN/CREAT SERPL: 11 (ref 6–25)
CALCIUM SERPL-MCNC: 8.8 MG/DL (ref 8.4–10.2)
CHLORIDE SERPL-SCNC: 87 MMOL/L (ref 98–107)
CK MB SERPL-MCNC: 2.4 NG/ML (ref 0–5)
CK SERPL-CCNC: 125 IU/L (ref 30–200)
CLARITY UR: CLEAR
CO2 SERPL-SCNC: 24 MMOL/L (ref 22–29)
COLOR UR: YELLOW
DEPRECATED NEUTROPHILS # BLD AUTO: 4.9 10*3/UL (ref 2.1–6.9)
DEPRECATED RBC URNS MANUAL-ACNC: (no result) /HPF (ref 0–5)
EGFRCR SERPLBLD CKD-EPI 2021: > 60 ML/MIN (ref 60–?)
EOSINOPHIL # BLD AUTO: 0.1 10*3/UL (ref 0–0.4)
EOSINOPHIL NFR BLD AUTO: 1.2 % (ref 0–6)
EPI CELLS URNS QL MICRO: (no result) /LPF
ERYTHROCYTE [DISTWIDTH] IN CORD BLOOD: 12.5 % (ref 11.7–14.4)
GLOBULIN PLAS-MCNC: 3.3 G/DL (ref 2.3–3.5)
GLUCOSE SERPLBLD-MCNC: 115 MG/DL (ref 74–118)
HCT VFR BLD AUTO: 32 % (ref 38.2–49.6)
HGB BLD-MCNC: 11.2 G/DL (ref 14–18)
KETONES UR QL STRIP.AUTO: NEGATIVE
LEUKOCYTE ESTERASE UR QL STRIP.AUTO: NEGATIVE
LYMPHOCYTES # BLD: 1 10*3/UL (ref 1–3.2)
LYMPHOCYTES NFR BLD AUTO: 14.3 % (ref 18–39.1)
MCH RBC QN AUTO: 31 PG (ref 28–32)
MCHC RBC AUTO-ENTMCNC: 35 G/DL (ref 31–35)
MCV RBC AUTO: 88.6 FL (ref 81–99)
MONOCYTES # BLD AUTO: 0.6 10*3/UL (ref 0.2–0.8)
MONOCYTES NFR BLD AUTO: 8.9 % (ref 4.4–11.3)
MUCOUS THREADS URNS QL MICRO: (no result)
NEUTS SEG NFR BLD AUTO: 73.9 % (ref 38.7–80)
NITRITE UR QL STRIP.AUTO: NEGATIVE
PLATELET # BLD AUTO: 256 X10E3/UL (ref 140–360)
POTASSIUM SERPL-SCNC: 4.5 MMOL/L (ref 3.5–5.1)
PROT UR QL STRIP.AUTO: (no result)
RBC # BLD AUTO: 3.61 X10E6/UL (ref 4.3–5.7)
SODIUM SERPL-SCNC: 120 MMOL/L (ref 136–145)
SP GR UR STRIP: 1.02 (ref 1.01–1.02)
UROBILINOGEN UR STRIP-MCNC: 0.2 MG/DL (ref 0.2–1)
WBC #/AREA URNS HPF: (no result) /HPF (ref 0–5)

## 2020-10-17 PROCEDURE — 84295 ASSAY OF SERUM SODIUM: CPT

## 2020-10-17 PROCEDURE — 94664 DEMO&/EVAL PT USE INHALER: CPT

## 2020-10-17 PROCEDURE — 85025 COMPLETE CBC W/AUTO DIFF WBC: CPT

## 2020-10-17 PROCEDURE — 82550 ASSAY OF CK (CPK): CPT

## 2020-10-17 PROCEDURE — 70450 CT HEAD/BRAIN W/O DYE: CPT

## 2020-10-17 PROCEDURE — 80053 COMPREHEN METABOLIC PANEL: CPT

## 2020-10-17 PROCEDURE — 96360 HYDRATION IV INFUSION INIT: CPT

## 2020-10-17 PROCEDURE — 84520 ASSAY OF UREA NITROGEN: CPT

## 2020-10-17 PROCEDURE — 82947 ASSAY GLUCOSE BLOOD QUANT: CPT

## 2020-10-17 PROCEDURE — 71045 X-RAY EXAM CHEST 1 VIEW: CPT

## 2020-10-17 PROCEDURE — 82570 ASSAY OF URINE CREATININE: CPT

## 2020-10-17 PROCEDURE — 81001 URINALYSIS AUTO W/SCOPE: CPT

## 2020-10-17 PROCEDURE — 82553 CREATINE MB FRACTION: CPT

## 2020-10-17 PROCEDURE — 95812 EEG 41-60 MINUTES: CPT

## 2020-10-17 PROCEDURE — 84484 ASSAY OF TROPONIN QUANT: CPT

## 2020-10-17 PROCEDURE — 70551 MRI BRAIN STEM W/O DYE: CPT

## 2020-10-17 PROCEDURE — 83935 ASSAY OF URINE OSMOLALITY: CPT

## 2020-10-17 PROCEDURE — 80048 BASIC METABOLIC PNL TOTAL CA: CPT

## 2020-10-17 PROCEDURE — 97139 UNLISTED THERAPEUTIC PX: CPT

## 2020-10-17 PROCEDURE — 84300 ASSAY OF URINE SODIUM: CPT

## 2020-10-17 PROCEDURE — 99285 EMERGENCY DEPT VISIT HI MDM: CPT

## 2020-10-17 PROCEDURE — 93005 ELECTROCARDIOGRAM TRACING: CPT

## 2020-10-17 PROCEDURE — 87086 URINE CULTURE/COLONY COUNT: CPT

## 2020-10-17 PROCEDURE — 87186 SC STD MICRODIL/AGAR DIL: CPT

## 2020-10-17 PROCEDURE — 36415 COLL VENOUS BLD VENIPUNCTURE: CPT

## 2020-10-17 NOTE — EMERGENCY DEPARTMENT NOTE
History of Present Illnes


History of Present Illness


Chief Complaint:  General Medicine Complaints


History of Present Illness


This is a 74 year old  male PRESENTS TO ED WITH REPORT OF AMS SINCE 

APPROX 1000 AM THIS


   MORNING; PT ORIENTED TO PERSON AND PLACE;  HE STATES HIS WIFE CALLED THE 

   AMBULANCE BECAUSE "I AM SICK IN THE HEAD"


   DENIES ANY OTHER SYMPTOMS. PER MEDICAL RECORD HERE AT THIS FACILITY PT HAS 

   HAD HYPONATREMIA IN THE PAST


   ALSO REPORST HE FELL THREE DAYS AGO HITTING BACK OF HEAD


Historian:  Patient, Paramedic/EMS


Arrival Mode:  [place]


Onset (how long ago):  hour(s) (10)


Location:  NONE


Quality:  REPORTED CONFUSION


Radiation:  Reports non-radiation


Severity:  mild


Onset quality:  unable to specify


Duration (how long):  hour(s) (10)


Timing of current episode:  unable to specify


Progression:  unchanged


Chronicity:  recurrent


Context:  Denies recent illness, Denies recent surgery, Denies trauma/injury


Relieving factors:  none


Exacerbating factors:  none


Associated symptoms:  Reports denies other symptoms


Treatments prior to arrival:  none





Past Medical/Family History


Physician Review


I have reviewed the patient's past medical and family history.  Any updates have

been documented here.





Past Medical History


Recent Fever:  No


Clinical Suspicion of Infectio:  Yes


New/Unexplained Change in Ment:  Yes


Past Medical History:  Hypertension, Hyperlipedemia


Other Medical History:  


SEASONAL ALLERGIES





PULMONARY FIBROSIS


Past Surgical History:  Cataract Removal


Other Surgery:  


cataract sx





Social History


Smoking Cessation:  Never Smoker


Alcohol Use:  None


Any Illegal Drug Use:  No





Family History


Family history of heart diseas:  No





Other


Last Tetanus:  UTD





Review of Systems


Review of Systems


Constitutional:  Reports no symptoms


EENTM:  Reports no symptoms


Cardiovascular:  Reports no symptoms


Respiratory:  Reports no symptoms


Gastrointestinal:  Reports no symptoms


Genitourinary:  Reports no symptoms


Musculoskeletal:  Reports no symptoms


Integumentary:  Reports no symptoms


Neurological:  Reports as per HPI


Psychological:  Reports no symptoms


Endocrine:  Reports no symptoms


Hematological/Lymphatic:  Reports no symptoms


Review of other systems:  All other systems negative





Physical Exam


Related Data


Allergies:  


Coded Allergies:  


     No Known Drug Allergies (Verified  Allergy, Mild, 7/16/10)


Triage Vital Signs





Vital Signs








  Date Time  Temp Pulse Resp B/P (MAP) Pulse Ox O2 Delivery O2 Flow Rate FiO2


 


10/17/20 20:56 98.8 73 18 197/82 100 Room Air  








Vital signs reviewed:  Yes





Physical Exam


CONSTITUTIONAL





Constitutional:  Present well-developed, Present well-nourished; 


   Absent distressed


HENT


HENT:  Present normocephalic, Present oropharynx clear/moist, Present nose n

ormal, Present other (ABRASIONA AND CONTUSION TO POSTERIOR SCALP)


HENT L/R:  Present left ext ear normal, Present right ext ear normal


EYES





Eyes:  Reports PERRL, Reports conjunctivae normal


NECK


Neck:  Present ROM normal


PULMONARY


Pulmonary:  Present effort normal, Present breath sounds normal


CARDIOVASCULAR





Cardiovascular:  Present regular rhythm, Present heart sounds normal, Present 

capillary refill normal, Present normal rate


GASTROINTESTINAL





Abdominal:  Present soft, Present nontender, Present bowel sounds normal


GENITOURINARY





Genitourinary:  Present exam deferred


SKIN


Skin:  Present warm, Present dry


MUSCULOSKELETAL





Musculoskeletal:  Present ROM normal


NEUROLOGICAL





Neurological:  Present alert, Present no gross motor or sensory deficits, 

Present other (PT ONLY DISORIENTED TO TIME, DOES ANSWER ALL OTHER QUESTIONS 

APPROPRIATELY)


PSYCHOLOGICAL


Psychological:  Present mood/affect normal, Present judgement normal





Results


Laboratory


Result Diagram:  


10/17/20 2050                                                                   

            10/17/20 2050





Laboratory





Laboratory Tests








Test


 10/17/20


20:50


 


White Blood Count


 6.63 x10e3/uL


(4.8-10.8)


 


Red Blood Count


 3.61 x10e6/uL


(4.3-5.7)


 


Hemoglobin


 11.2 g/dL


(14.0-18.0)


 


Hematocrit


 32.0 %


(38.2-49.6)


 


Mean Corpuscular Volume


 88.6 fL


(81-99)


 


Mean Corpuscular Hemoglobin


 31.0 pg


(28-32)


 


Mean Corpuscular Hemoglobin


Concent 35.0 g/dL


(31-35)


 


Red Cell Distribution Width


 12.5 %


(11.7-14.4)


 


Platelet Count


 256 x10e3/uL


(140-360)


 


Neutrophils (%) (Auto)


 73.9 %


(38.7-80.0)


 


Lymphocytes (%) (Auto)


 14.3 %


(18.0-39.1)


 


Monocytes (%) (Auto)


 8.9  %


(4.4-11.3)


 


Eosinophils (%) (Auto)


 1.2 %


(0.0-6.0)


 


Basophils (%) (Auto)


 1.4 %


(0.0-1.0)


 


Neutrophils # (Auto) 4.9 (2.1-6.9) 


 


Lymphocytes # (Auto) 1.0 (1.0-3.2) 


 


Monocytes # (Auto) 0.6 (0.2-0.8) 


 


Eosinophils # (Auto) 0.1 (0.0-0.4) 


 


Basophils # (Auto) 0.1 (0.0-0.1) 


 


Absolute Immature Granulocyte


(auto 0.02 x10e3/uL


(0-0.1)


 


Sodium Level


 120 mmol/L


(136-145)


 


Potassium Level


 4.5 mmol/L


(3.5-5.1)


 


Chloride Level


 87 mmol/L


()


 


Carbon Dioxide Level


 24 mmol/L


(22-29)


 


Anion Gap


 13.5 mmol/L


(8-16)


 


Blood Urea Nitrogen 8 mg/dL (7-26) 


 


Creatinine


 0.76 mg/dL


(0.72-1.25)


 


Estimat Glomerular Filtration


Rate > 60 ML/MIN


(60-)


 


BUN/Creatinine Ratio 11 (6-25) 


 


Glucose Level


 115 mg/dL


()


 


Calcium Level


 8.8 mg/dL


(8.4-10.2)


 


Total Bilirubin


 0.2 mg/dL


(0.2-1.2)


 


Aspartate Amino Transf


(AST/SGOT) 22 IU/L (5-34) 





 


Alanine Aminotransferase


(ALT/SGPT) 16 IU/L (0-55) 





 


Alkaline Phosphatase


 70 IU/L


()


 


Creatine Kinase


 125 IU/L


()


 


Total Protein


 7.1 g/dL


(6.5-8.1)


 


Albumin


 3.8 g/dL


(3.5-5.0)


 


Globulin


 3.3 g/dL


(2.3-3.5)


 


Albumin/Globulin Ratio 1.2 (0.8-2.0) 











Imaging


Imaging results reviewed:  Yes


Impressions


Procedure: [id number] DX/CHEST SINGLE (PORTABLE)


Exam Date: 10/17/20                            Exam Time: 2125








                              REPORT STATUS: Signed





EXAMINATION:  CHEST SINGLE (PORTABLE)    





INDICATION:      


^confusion


^[id number]


^2125


^Y





COMPARISON:  9/23/2020


     


FINDINGS:  AP view   





TUBES and LINES:  None.





LUNGS:  Limited by low lung volumes.  Diffuse bilateral airspace opacities.


Changes of lower lung fields, related to interstitial lung disease.





PLEURA:  No significant pleural effusion or pneumothorax.





HEART AND MEDIASTINUM:  The cardiomediastinal silhouette is unremarkable.    





BONES AND SOFT TISSUES:  No acute osseous lesion.  Soft tissues are


unremarkable.





UPPER ABDOMEN: No free air under the diaphragm.    





IMPRESSION: 


Diffuse bilateral airspace opacities, representing edema and/or pneumonia.


Changes of lower lung fields, related to interstitial lung disease.











Signed by: Dr. Manolo Bustamante MD on 10/17/2020 9:49 PM








Dictated By: MANOLO BUSTAMANTE MD


Electronically Signed By: MANOLO BUSTAMANTE MD on 10/17/20 2149


Transcribed By: ZELDA on 10/17/20 2149 








COPY TO:   BRITANY MASTERS MD~














Procedure: [id number] CT/CT BRAIN WO


Exam Date: 10/17/20                            Exam Time: 2115








                              REPORT STATUS: Signed





EXAMINATION: Head CT without contrast.  








HISTORY:Altered mental status.





COMPARISON:CT brain from 9/25/2020.





TECHNIQUE: Multidetector axial images were obtained from the foramen magnum to


the vertex without contrast. The images were reconstructed using brain and bone


algorithms.  Thin section brain images were reformatted into coronal and


sagittal planes.


Dose modulation, iterative reconstruction, and/or weight based adjustment of


the mA/kV was utilized to reduce the radiation dose to as low as reasonably


achievable.





Intravenous contrast: None  





IMAGE QUALITY: Acceptable.





FINDINGS:


    Skull/scalp: No lytic or blastic. lesions.  No surgical changes.


    Parenchyma: Unchanged nonspecific bilateral frontoparietal confluent


periventricular, patchy and confluent subcortical and deep white matter


hypodensities are likely related to small vessel ischemic changes.


Chronic lacunar infarct in the left thalamocapsular region.


No acute hemorrhage, mass or acute major vascular territorial infarct.


    Arteries: No density suggestive of thrombosis.  Atherosclerotic


calcification in bilateral carotid siphon and vertebral basilar system which is


associated with dolichoectasia. 


    Dural sinuses: No abnormal density suggestive of thrombosis. 


    Ventricles: Moderate compensated dilatation due to volume loss. No acute


hydrocephalus.


    Extra-axial spaces: Unchanged prominent prepontine and premedullary cistern


may be secondary to underlying arachnoid cyst or due to ectatic course of


vertebrobasilar vessels.


    Brain volume: Normal for age.


    Craniocervical junction: No mass, Chiari malformation, or basilar


invagination.


    Sella: Partial empty sella. 


    Paranasal/mastoid sinuses: Imaged portions unremarkable.








IMPRESSION:


No acute intracranial abnormality.


No change since CT brain from 9/25/2020.





Chronic findings:


1. Diffuse supratentorial white matter microvascular ischemic changes.


2. Chronic lacunar infarct in left thalamocapsular region.


3. Mild generalized cerebral volume loss.





Signed by: Dr. Cecile Pineda M.D. on 10/17/2020 9:41 PM








Dictated By: CECILE PINEDA MD


Electronically Signed By: CECILE PINEDA MD on 10/17/20 2141


Transcribed By: ZELDA on 10/17/20 2141 








COPY TO:   BRITANY MASTERS MD~





Procedures


12 Lead ECG Interpretation


ECG Interpretation :  


   ECG:  ECG 1


   :  Interpreted by ED physician


   Date:  Oct 17, 2020


   Time:  20:59


   Rhythm:  sinus rhythm


   Rate:  normal


   BPM:  72


   QRS axis:  normal


   ST segments normal:  Yes


   T waves normal:  Yes


   Other findings:  LVH


   Q waves:  III, aVF, V1


   Clinical Impression:  abnormal ECG





Assessment & Plan


Medical Decision Making


MDM


PT WITH CONFUSION AND H/O HYPONATREMIA





CBC, CMP, EKG, CXR, CT BRAIN,UA ORDERED TO EVAL FOR HYPONATREMIA, UTI, 

PNEUMONIA, INTRACRANIAL ABNORMALITY, 





I SPOKE WITH DR ELEAZAR GUTIERREZ, PLACE IN ICU AND FLUID RESTRICT TO 1 LITER 

FLUID PER DAY





Assessment & Plan


Final Impression:  


(1) Hyponatremia


(2) Weakness


Depart Disposition:  ADMITTED


Last Vital Signs











  Date Time  Temp Pulse Resp B/P (MAP) Pulse Ox O2 Delivery O2 Flow Rate FiO2


 


10/17/20 20:56 98.8 73 18 197/82 100 Room Air  








Home Meds


Reported Medications


Lisinopril (Lisinopril) 40 Mg Tablet, 1 TAB PO DAILY


   1/26/12











BRITANY MASTERS MD        Oct 17, 2020 21:27

## 2020-10-17 NOTE — XMS REPORT
Continuity of Care Document

                             Created on: 10/17/2020



AMARILYS AMADOR

External Reference #: [id number]

: 1945

Sex: Male



Demographics





                          Address                   [address]

[place]  [address]

 

                          Home Phone                [phone]

 

                          Preferred Language        English

 

                          Marital Status            Unknown

 

                          Gnosticism Affiliation     Unknown

 

                          Race                      Unknown

 

                                        Additional Race(s) 



Other



Other



 

                          Ethnic Group              Non-





Author





                          Author                    [facility]

t

 

                          Organization              [facility]

 

                          Address                   [address]

[place]  [address]



 

                          Phone                     Unavailable







Support





                Name            Relationship    Address         Phone

 

                    COMFORT VILLEDA         ECON                [id number]

[place]  [address]                      Unavailable

 

                    FADUMO VILLEDAA         PRS                 [place]KNOWN

[place]  [address]                      [phone]

 

                    EMILY AMADORMEN    PRS                 [address]

[id number]

[place], [place]  [address] [phone]

 

                    EMILY AMADORMEN    PRS                 [address]

[address]

[place], [place]  [address] [phone]

 

                    FADUMO VILLEDAA         PRS                 N/A

[place], [place]  [address] [phone]

 

                    MARJORIE  [place]    PRS                 [address]

[id number]

[place], [place]  [address] [phone]







Care Team Providers





                    Care Team Member Name Role                Phone

 

                    MD KENDELL JORGE MD PCP                 [phone]

 

                    ELEAZAR PURI        Attphys             Unavailable

 

                    Tommy Abdi  Attphys             [phone]

 

                    Norberto Jorge Attphys             [phone]

 

                    LEONOR PADILLA      Attphys             Unavailable

 

                    ELEAZAR PURI        Admphys             Unavailable







Payers





           Payer Name Policy Type Policy Number Effective Date Expiration Date [facility]            [id number]                        

[facility]







Problems





           Condition Name Condition Details Condition Category Status     Onset 

Date Resolution

Date            Last Treatment Date Treating Clinician Comments        Source

 

                          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              

           J32.4 CHRONIC 

PANSINUSITIS***WITH LANDMA                        Active                        
2020                                                                      
                          [facility]                     Diagnosis           Ac

tive              

2020 00:00:00              2020 14:38:00                           KENDELL Toro

 

                          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               

          M47.816 - 

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                     
  10/03/2019                                                                    
                           WILLIAM Moreno                     Diagnosis       

          Active

           2019-10-03 00:01:00            2019-10-03 10:28:00                   

    Betsy Toro

 

                          UNK                                               UNK 

                       Active                   

    2013                                                                  
                             [facility]                     Diagnosis         

  Active              

2013 00:00:00              2013 09:03:00                           M

terrierial [place]

 

                          OTHER                                             OTHE

R                        Active               

        2012                                                              
                                  [facility]                     Diagnosis     

            

Active     2012 00:00:00            2012 09:01:00                   

    [facility] [place]

 

       Facial laceration        Problem Active                                  

  [facility]

 

       Hyponatremia        Problem Active                                    [facility]

 

       Weakness        Problem Active                                    [facility]

 

       Pain in both knees        Problem Active                                 

   [facility]

 

       Right wrist pain        Problem Active                                   

 [facility]

 

                          Hearing loss (finding)                            Hear

ing loss (finding)           

            Active                                                Problem       
                2020                        rt ear                        
 [facility]                     [facility]      Active           

                      2020 

22:48:33                                                    Betsy Toro

 

                          Hypertensive disorder, systemic arterial (disorder)   

                      

Hypertensive disorder, systemic arterial (disorder)                        
Active                                                Problem                   
    2020                                                 [facility]                     [facility]      Active                   

              2020 22:48:33

                                                            Betsy Toro







Allergies, Adverse Reactions, Alerts





        Allergy Name Allergy Type Status  Severity Reaction(s) Onset Date Inacti

ve Date 

Treating Clinician        Comments                  Source

 

       No Known Allergies DA     Active U             2020-10-05 00:00:00       

               [facility]

 

       No Known Allergies DA     Active U             2020-02-10 00:00:00       

               [facility]

 

       No Known Allergies DA     Active U             2015 00:00:00       

               [facility]

 

       No Known Medication Allergies No Known Medication Allergies Active       

                                    

Betsy Toro







Social History





           Social Habit Start Date Stop Date  Quantity   Comments   Source

 

           Social History 2013 15:43:17 2013 15:43:17               

        Betsy Toro

 

           Sex Assigned At Birth 1945 00:00:00 1945 00:00:00 Male   

               [facility]







Medications





             Ordered Medication Name Filled Medication Name Start Date   Stop Da

te    Current 

Medication? Ordering Clinician Indication Dosage     Frequency  Signature (SIG) 

Comments                  Components                Source

 

        Naprosyn 500 mg oral tablet         2012 14:37:17         Yes     

Jodi Stewartzik          

                          500 mg, 1 tab, PO, BID, 14 tab, Substitution Allowed, 

TAB                           Betsy Toro

 

           acetaminophen-hydrocodone 500 mg-5 mg oral tablet             14:37:16            Yes        

Jodi Stewarttatumk                                                 1-2 tab, PO

, Q4-6H, PRN, 15 tab, Pain, Substitution 

Allowed, Maintenance                                         Betsy Toro

 

           Toradol 30 mg/mL injectable solution            2012 14:34:00  

          No         Jodi Stewarttatumnereyda                                                          60 mg, 2 mL, Rou

te: IM, Drug form: INJ, ONCE, Start date: 12 

8:34:00, Stop date: 12 8:34:00                                         Suzy Toro

 

           acetaminophen-hydrocodone 325 mg-5 mg oral tablet             14:34:00            No         

Jodi Stewartwillem                                                 1 tab, Rout

e: PO, Drug Form: TAB, ONCE, STAT, Start 

date: 12 8:34:00, Stop date: 12 8:34:00                             

            [facility]

 

     Lisinopril Lisinopril           Yes            1         Daily           [facility]

[facility]

 

                          Amlodipine Besylate (Norvasc) 2.5 Mg TABLET Amlodipine

 Besylate (Norvasc) 2.5 Mg

TABLET       2020 00:00:00 No                1           Daily            

 [facility]

 

     Cefprozil Cefprozil      2020 00:00:00 No             1         Twice

 Daily           [facility]







Vital Signs





             Vital Name   Observation Time Observation Value Comments     Source

 

             Body Temperature 2020 09:00:00 98.1 [degF]               [facility]

 

             Weight       2020 19:25:00 180 [lb_av]               [facility]

 

             BMI (Body Mass Index) 2020 19:25:00 28.2 kg/m2               

 [facility]

 

             Heart Rate   2012 15:28:00                           [facility]

 [place]

 

             Temperature Oral (F) 2012 15:28:00 98.3 F                    

Betsy Toro

 

             Respitory Rate 2012 15:28:00                           [facility]

[facility] [place]

 

             Diastolic (mm Hg) 2012 15:28:00                           [facility]

[facility] [place]

 

             Systolic (mm Hg) 2012 15:28:00                           [place]

[facility] [place]

 

             Heart Rate   2012 14:17:00                           [facility]

 [place]

 

             Diastolic (mm Hg) 2012 14:17:00                           [facility]

[facility] [place]

 

             Temperature Oral (F) 2012 14:17:00 98.1 F                    

[facility] [place]

 

             Respitory Rate 2012 14:17:00                           [facility]

[facility] [place]

 

             Systolic (mm Hg) 2012 14:17:00                           [place]

[facility] [place]

 

             Weight       2012 14:16:00                           [facility]

 [place]

 

             Height       2012 14:16:00 170.18 cm                 [facility]

 [place]







Procedures





                Procedure       Date / Time Performed Performing Clinician [facility]

e

 

                Computed tomography of brain without radiopaque contrast 2020 00:00:00                 

[facility]

 

                Computed tomography of chest without contrast 2020 00:00:0

0                 [facility]

 

                Computed tomography of brain without radiopaque contrast 2020 00:00:00                 

[facility]

 

                CT maxillofacial area wo contrast 2020 00:00:00           

      [facility]

 

                Computed tomography of cervical spine without contrast 2020

3 00:00:00                 [facility]

 

                X-ray of chest, two views 2020 00:00:00                 [facility]

[facility]

 

                Cataract surgery                                 [facility]







Plan of Care





             Planned Activity Planned Date Details      Comments     Source

 

             Instructions              Pneumonia - Bacterial              [facility]







Encounters





             Start Date/Time End Date/Time Encounter Type Admission Type Attendi

[facility] Facility   Care Department Encounter ID    Source

 

          2020 14:28:00 2020 23:59:00 Outpatient           Zander Abdi [facility]                      [id number]               

 

        2020 14:28:00 2020 14:28:00 Outpatient                 [facility] 

   [facility]    [id number]    [facility]

 

        2019 10:39:00 2019 23:59:00 Discharged Recurring            

     [facility]  

[id number]                            [facility]

 

           2019-10-03 10:09:00 2019-10-03 23:59:00 Outpatient            Ervin Jorge 

[facility]              [id number]         

 

           2019 08:47:00 2019 08:47:00 Registered Clinic 3          

LEONOR PADILLA [facility]              [id number]        [facility]







Results





           Test Description Test Time  Test Comments Results    Result Comments 

Source

 

                    HEPATIC FUNCTION PANEL 2020-10-11 02:06:00   

 

                                        Test Item

 

             TOTAL PROTEIN (test code = PROT) 6.7 gram/dL  6.4-8.2      N       

      

 

             ALBUMIN (test code = ALB) 3.6 g/dL     3.4-5.0      N             

 

             GLOBULIN (test code = GLOB) 3.1 gram/dL  2.7-4.2      N            

 

 

             ALBUMIN/GLOBULIN RATIO (test code = A/G) 1.2          0.75-1.50    

N             

 

             BILIRUBIN TOTAL (test code = BILT) 0.40 mg/dL   0.0-1.0      N     

        

 

             BILIRUBIN DIRECT (test code = BILD) 0.11 mg/dL   0.0-0.20     N    

         

 

             SGOT/AST (test code = AST) 20 IUnit/L   15-37        N             

 

             SGPT/ALT (test code = ALT) 17 IUnit/L   12-78        N             

 

             ALKALINE PHOSPHATASE TOTAL (test code = ALKP) 61 IUnit/L     

     N            **Note change 

in reference range due to change in reagent.**





VITAMIN B122020-10-11 02:06:00* 



             Test Item    Value        Reference Range Interpretation Comments

 

             VITAMIN B12 (test code = VITB12) 546 pg/mL    193-986      N       

      





FOLIC ACID2020-10-11 02:06:00* 



             Test Item    Value        Reference Range Interpretation Comments

 

             FOLIC ACID (test code = FOL) 17.7 ng/mL   3.10-17.50   H           

  





THYROID STIMULATING HORMONE2020-10-11 02:06:00* 



             Test Item    Value        Reference Range Interpretation Comments

 

             THYROID STIMULATING HORMONE (test code = TSH) 0.362 uIU/mL 0.36-3.7

4    N            TSH 

REFERENCE RANGES:  EUTHYROID:     0.35 - 4.3 mIU/mL                       HYPO  
   :     > 5.5      mIU/mL                       HYPER    :     < 0.35     
mIU/mL





VITAMIN B6/PYRIDOXINE2020-10-11 02:06:00* 



             Test Item    Value        Reference Range Interpretation Comments

 

             VITAMIN B6/PYRIDOXINE (test code = VITB6) 11.3 ug/L    5.3-46.7    

              Performed At:  

[facility] [id number] Gregorio MD 
Ph:[phone]





VIT B1 WHOLE BLOOD2020-10-11 02:06:00* 



             Test Item    Value        Reference Range Interpretation Comments

 

             VIT B1 WHOLE BLOOD (test code = IYWA6EY) 74.5 nmol/L  66.5-200.0   

             Performed At: 

 [facility] [id number] Gregorio MD 
Ph:[phone]





HEPATIC FUNCTION PANEL2020-10-10 21:06:00* 



             Test Item    Value        Reference Range Interpretation Comments

 

             TOTAL PROTEIN (test code = PROT) 6.7 gram/dL  6.4-8.2      N       

      

 

             ALBUMIN (test code = ALB) 3.6 g/dL     3.4-5.0      N             

 

             GLOBULIN (test code = GLOB) 3.1 gram/dL  2.7-4.2      N            

 

 

             ALBUMIN/GLOBULIN RATIO (test code = A/G) 1.2          0.75-1.50    

N             

 

             BILIRUBIN TOTAL (test code = BILT) 0.40 mg/dL   0.0-1.0      N     

        

 

             BILIRUBIN DIRECT (test code = BILD) 0.11 mg/dL   0.0-0.20     N    

         

 

             SGOT/AST (test code = AST) 20 IUnit/L   15-37        N             

 

             SGPT/ALT (test code = ALT) 17 IUnit/L   12-78        N             

 

             ALKALINE PHOSPHATASE TOTAL (test code = ALKP) 61 IUnit/L     

     N            **Note change 

in reference range due to change in reagent.**





VITAMIN B122020-10-10 21:06:00* 



             Test Item    Value        Reference Range Interpretation Comments

 

             VITAMIN B12 (test code = VITB12) 546 pg/mL    193-986      N       

      





FOLIC ACID2020-10-10 21:06:00* 



             Test Item    Value        Reference Range Interpretation Comments

 

             FOLIC ACID (test code = FOL) 17.7 ng/mL   3.10-17.50   H           

  





THYROID STIMULATING HORMONE2020-10-10 21:06:00* 



             Test Item    Value        Reference Range Interpretation Comments

 

             THYROID STIMULATING HORMONE (test code = TSH) 0.362 uIU/mL 0.36-3.7

4    N            TSH 

REFERENCE RANGES:  EUTHYROID:     0.35 - 4.3 mIU/mL                       HYPO  
   :     > 5.5      mIU/mL                       HYPER    :     < 0.35     
mIU/mL





VITAMIN B6/PYRIDOXINE2020-10-10 21:06:00* 



             Test Item    Value        Reference Range Interpretation Comments

 

             VITAMIN B6/PYRIDOXINE (test code = VITB6) 11.3 ug/L    5.3-46.7    

              Performed At:  

[facility] [id number] Sanjai MD 
Ph:[phone]





VIT B1 WHOLE BLOOD2020-10-10 21:06:00* 



             Test Item    Value        Reference Range Interpretation Comments

 

             VIT B1 WHOLE BLOOD (test code = EAZR3YO)  nmol/L             

              





BASIC METABOLIC PANEL2020-10-09 07:38:00* 



             Test Item    Value        Reference Range Interpretation Comments

 

             SODIUM (test code = NA) 136 mmol/L   136-145      N             

 

             POTASSIUM (test code = K) 4.3 mmol/L   3.5-5.1      N             

 

             CHLORIDE (test code = CL) 104.0 mmol/L        N             

 

             CARBON DIOXIDE (test code = CO2) 25.0 mmol/L  21-32        N       

      

 

             ANION GAP (test code = GAP) 11.3         10-20        N            

 

 

             GLUCOSE (test code = GLU) 91 mg/dL            N             

 

             BLOOD UREA NITROGEN (test code = BUN) 8 mg/dL      7-18         N  

           

 

             GLOMERULAR FILTRATION RATE (test code = GFR) > 60 mL/min  >=60     

                 Estimated GFR by

 using Modified MDRD formula.Chronic kidney disease is defined as either kidney 
damageor GFR <60 mL/min/1.73 m2 for >3 months.

 

             CREATININE (test code = CREAT) 0.50 mg/dL   0.7-1.3      L         

    

 

             BUN/CREATININE RATIO (test code = BUN/CREA) 15.0         10-20     

   N             

 

             CALCIUM (test code = CA) 8.2 mg/dL    8.5-10.1     L             





BASIC METABOLIC PANEL2020-10-08 12:20:00* 



             Test Item    Value        Reference Range Interpretation Comments

 

             SODIUM (test code = NA) 135 mmol/L   136-145      L             

 

             POTASSIUM (test code = K) 3.8 mmol/L   3.5-5.1      N             

 

             CHLORIDE (test code = CL) 102.0 mmol/L        N             

 

             CARBON DIOXIDE (test code = CO2) 28.0 mmol/L  21-32        N       

     Previously reported result:

 28.0 mmol/LEdited by: [email or web address] on 10/08/20:1220

 

             ANION GAP (test code = GAP) 8.8          10-20        L            

 

 

             GLUCOSE (test code = GLU) 105 mg/dL           N             

 

             BLOOD UREA NITROGEN (test code = BUN) 6 mg/dL      7-18         L  

           

 

             GLOMERULAR FILTRATION RATE (test code = GFR) > 60 mL/min  >=60     

                 Estimated GFR by

 using Modified MDRD formula.Chronic kidney disease is defined as either kidney 
damageor GFR <60 mL/min/1.73 m2 for >3 months.

 

             CREATININE (test code = CREAT) 0.70 mg/dL   0.7-1.3      N         

    

 

             BUN/CREATININE RATIO (test code = BUN/CREA) 8.6          10-20     

   L             

 

             CALCIUM (test code = CA) 8.7 mg/dL    8.5-10.1     N             





BASIC METABOLIC PANEL2020-10-08 11:57:00* 



             Test Item    Value        Reference Range Interpretation Comments

 

             SODIUM (test code = NA) 135 mmol/L   136-145      L             

 

             POTASSIUM (test code = K) 3.8 mmol/L   3.5-5.1      N             

 

             CHLORIDE (test code = CL) 102.0 mmol/L        N             

 

             CARBON DIOXIDE (test code = CO2)  mmol/L      21-32                

      

 

             ANION GAP (test code = GAP)              10-20                     

 

 

             GLUCOSE (test code = GLU)  mg/dL                            

 

             BLOOD UREA NITROGEN (test code = BUN)  mg/dL       7-18            

           

 

             GLOMERULAR FILTRATION RATE (test code = GFR)  mL/min      >=60     

                  

 

             CREATININE (test code = CREAT)  mg/dL       0.7-1.3                

    

 

             BUN/CREATININE RATIO (test code = BUN/CREA)              10-20     

                 

 

             CALCIUM (test code = CA)  mg/dL       8.5-10.1                   





BASIC METABOLIC PANEL2020-10-08 11:57:00* 



             Test Item    Value        Reference Range Interpretation Comments

 

             SODIUM (test code = NA) 135 mmol/L   136-145      L             

 

             POTASSIUM (test code = K) 3.8 mmol/L   3.5-5.1      N             

 

             CHLORIDE (test code = CL) 102.0 mmol/L        N             

 

             CARBON DIOXIDE (test code = CO2) 28.0 mmol/L  21-32        N       

      

 

             ANION GAP (test code = GAP)              10-20                     

 

 

             GLUCOSE (test code = GLU) 105 mg/dL           N             

 

             BLOOD UREA NITROGEN (test code = BUN) 6 mg/dL      7-18         L  

           

 

             GLOMERULAR FILTRATION RATE (test code = GFR) > 60 mL/min  >=60     

                 Estimated GFR by

 using Modified MDRD formula.Chronic kidney disease is defined as either kidney 
damageor GFR <60 mL/min/1.73 m2 for >3 months.

 

             CREATININE (test code = CREAT) 0.70 mg/dL   0.7-1.3      N         

    

 

             BUN/CREATININE RATIO (test code = BUN/CREA) 8.6          10-20     

   L             

 

             CALCIUM (test code = CA) 8.7 mg/dL    8.5-10.1     N             





CBC W/AUTO DIFF2020-10-08 11:45:00* 



             Test Item    Value        Reference Range Interpretation Comments

 

             WHITE BLOOD CELL (test code = WBC) 5.0 K/mm3    4.5-12.5     N     

        

 

             RED BLOOD CELL (test code = RBC) 4.03 mill/mm3 4.0-5.8      N      

       

 

             HEMOGLOBIN (test code = HGB) 12.8 gram/dL 13.0-17.5    L           

  

 

             HEMATOCRIT (test code = HCT) 37.9 %       42.0-52.0    L           

  

 

             MEAN CELL VOLUME (test code = MCV) 94.0 fL      80-98        N     

        

 

             MEAN CELL HGB (test code = MCH) 31.8 picogram 27.0-33.0    N       

      

 

             MEAN CELL HGB CONCETRATION (test code = MCHC) 33.8 gram/dL 33.0-36.

0    N             

 

             RED CELL DISTRIBUTION WIDTH (test code = RDW) 12.9 %       11.6-16.

2    N             

 

             RED CELL DISTRIBUTION WIDTH SD (test code = RDW-SD) 44.6 fL      37

.0-51.0    N             

 

             PLATELET COUNT (test code = PLT) 243 K/mm3    150-450      N       

      

 

             MEAN PLATELET VOLUME (test code = MPV) 9.5 fL       6.7-11.0     N 

            

 

             NEUTROPHIL % (test code = NT%) 69.6 %       39.0-69.0    H         

    

 

             IMMATURE GRANULOCYTE % (test code = IG%) 0.2 %        0.0-5.0      

N             

 

             LYMPHOCYTE % (test code = LY%) 15.3 %       25.0-55.0    L         

    

 

             MONOCYTE % (test code = MO%) 10.7 %       0.0-10.0     H           

  

 

             EOSINOPHIL % (test code = EO%) 2.8 %        0.0-5.0      N         

    

 

             BASOPHIL % (test code = BA%) 1.4 %        0.0-1.0      H           

  

 

             NUCLEATED RBC % (test code = NRBC%) 0.0 %        0-0          N    

         

 

             NEUTROPHIL # (test code = NT#) 3.46 K/mm3   1.8-7.7      N         

    

 

             IMMATURE GRANULOCYTE # (test code = IG#) 0.01 x10 3/uL 0-0.03      

 N             

 

             LYMPHOCYTE # (test code = LY#) 0.76 K/mm3   1.0-5.0      L         

    

 

             MONOCYTE # (test code = MO#) 0.53 K/mm3   0-0.8        N           

  

 

             EOSINOPHIL # (test code = EO#) 0.14 K/mm3   0.0-0.5      N         

    

 

             BASOPHIL # (test code = BA#) 0.07 K/mm3   0.0-0.2      N           

  

 

             NUCLEATED RBC # (test code = NRBC#) 0.00 K/mm3   0.0-0.1      N    

         

 

             MANUAL DIFF REQUIRED (test code = MDIFF) NO                        

              





BASIC METABOLIC PANEL2020-10-07 11:30:00* 



             Test Item    Value        Reference Range Interpretation Comments

 

             SODIUM (test code = NA) 133 mmol/L   136-145      L             

 

             POTASSIUM (test code = K) 4.4 mmol/L   3.5-5.1      N             

 

             CHLORIDE (test code = CL) 101.0 mmol/L        N             

 

             CARBON DIOXIDE (test code = CO2) 25.0 mmol/L  21-32        N       

      

 

             ANION GAP (test code = GAP) 11.4         10-20        N            

 

 

             GLUCOSE (test code = GLU) 101 mg/dL           N             

 

             BLOOD UREA NITROGEN (test code = BUN) 6 mg/dL      7-18         L  

           

 

             GLOMERULAR FILTRATION RATE (test code = GFR) > 60 mL/min  >=60     

                 Estimated GFR by

 using Modified MDRD formula.Chronic kidney disease is defined as either kidney 
damageor GFR <60 mL/min/1.73 m2 for >3 months.

 

             CREATININE (test code = CREAT) 0.50 mg/dL   0.7-1.3      L         

    

 

             BUN/CREATININE RATIO (test code = BUN/CREA) 11.5         10-20     

   N             

 

             CALCIUM (test code = CA) 8.5 mg/dL    8.5-10.1     N             





- MRI BRAIN W/O CONTRAST2020-10-06 19:43:00 FAX:         Nikolas Mario MD 
[phone]    [place]:    St: ADM FAX:         Mandi Conway   
[phone]   ----------------------------------------
---------------------------------------  Name:   AMARILYS AMADOR                   
  [facility]                     : 1945  Age/S: 74/M           [address]
 [facility]                Unit #: [id number]      [place]: [place]  [address]              Phys: Mandi Conway  NP                            
                  Acct: [id number] Dis Date:               Status: ADM IN     
                            PHONE #: [phone]     Exam Date:     10/06/2020 
    1720                   FAX #: [phone]     Reason: AMS, TIA             
                              EXAMS:                                            
   CPT CODE:      [id number] MRI BRAIN W/O CONTRAST                     [id number]    
                EXAM: MRI of the brain without contrast;               INFORMATI
ON: AMS, TIA;               TECHNIQUE AND FINDINGS:       Multiplanar, multisequ
ence scans of the brain were obtained including       diffusion-weighted studies
.       The diffusion scans show no areas of restricted diffusion.       Extensi
ve gliosis is seen involving the periventricular and deep white       matter, be
st demonstrated on the FLAIR study.       Otherwise, unremarkable gray/white mat
ter differentiation.       The gradient echo study shows no evidence of intra or
 extra-axial       hemorrhage.       No mass lesions or midline shift.       Exp
ected flow-voids are seen within vascular structures.       Symmetric dilatation
 of the ventricle; sulci and basilar cisterns are       intact.                 
IMPRESSION:         1. No evidence of intracranial hemorrhage or acute ischemic 
lesions.         2. Extensive chronic ischemic white matter changes.         3. 
Moderate atrophy.                   Location code: [facility]                   ** Elec
tronically Signed by FORREST Rodriguez **           **             on 10/06/2
020 at 1943             **                      Reported and signed by: Luis Fernando Rodriguez M.D.              CC: Nikolas Mario MD; Mandi Conway NP     
                                                                                
       Technologist: WESTLEY ORTIZRT - MRI                                 Sarah
crd Date/Time/By: 10/06/2020 () : By: Amanda           Orig Print D/T: S:
 10/06/2020 ()                         PAGE  1                       Signed 
Report                               BASIC METABOLIC PANEL2020-10-06 08:26:00* 



             Test Item    Value        Reference Range Interpretation Comments

 

             SODIUM (test code = NA) 126 mmol/L   136-145      L             

 

             POTASSIUM (test code = K) 3.9 mmol/L   3.5-5.1      N             

 

             CHLORIDE (test code = CL) 94.0 mmol/L         L             

 

             CARBON DIOXIDE (test code = CO2) 26.0 mmol/L  21-32        N       

      

 

             ANION GAP (test code = GAP) 9.9          10-20        L            

 

 

             GLUCOSE (test code = GLU) 87 mg/dL            N             

 

             BLOOD UREA NITROGEN (test code = BUN) 7 mg/dL      7-18         N  

           

 

             GLOMERULAR FILTRATION RATE (test code = GFR) > 60 mL/min  >=60     

                 Estimated GFR by

 using Modified MDRD formula.Chronic kidney disease is defined as either kidney 
damageor GFR <60 mL/min/1.73 m2 for >3 months.

 

             CREATININE (test code = CREAT) 0.50 mg/dL   0.7-1.3      L         

    

 

             BUN/CREATININE RATIO (test code = BUN/CREA) 13.4         10-20     

   N             

 

             CALCIUM (test code = CA) 8.4 mg/dL    8.5-10.1     L             





SODIUM2020-10-05 22:05:00* 



             Test Item    Value        Reference Range Interpretation Comments

 

             SODIUM (test code = NA) 126 mmol/L   136-145      L             





SPECIMEN COMMENTS: pls obtain q6hr for th next 24hrs.AB TREPONEMA2020-10-05 
20:06:00* 



             Test Item    Value        Reference Range Interpretation Comments

 

             AB TREPONEMA (test code = TREPAB) Nonreactive Index NonReactive    

            





HEPATIC FUNCTION PANEL2020-10-05 19:55:00* 



             Test Item    Value        Reference Range Interpretation Comments

 

             TOTAL PROTEIN (test code = PROT) 6.7 gram/dL  6.4-8.2      N       

      

 

             ALBUMIN (test code = ALB) 3.6 g/dL     3.4-5.0      N             

 

             GLOBULIN (test code = GLOB) 3.1 gram/dL  2.7-4.2      N            

 

 

             ALBUMIN/GLOBULIN RATIO (test code = A/G) 1.2          0.75-1.50    

N             

 

             BILIRUBIN TOTAL (test code = BILT) 0.40 mg/dL   0.0-1.0      N     

        

 

             BILIRUBIN DIRECT (test code = BILD) 0.11 mg/dL   0.0-0.20     N    

         

 

             SGOT/AST (test code = AST) 20 IUnit/L   15-37        N             

 

             SGPT/ALT (test code = ALT) 17 IUnit/L   12-78        N             

 

             ALKALINE PHOSPHATASE TOTAL (test code = ALKP) 61 IUnit/L     

     N            **Note change 

in reference range due to change in reagent.**





VITAMIN B122020-10-05 19:55:00* 



             Test Item    Value        Reference Range Interpretation Comments

 

             VITAMIN B12 (test code = VITB12) 546 pg/mL    193-986      N       

      





FOLIC ACID2020-10-05 19:55:00* 



             Test Item    Value        Reference Range Interpretation Comments

 

             FOLIC ACID (test code = FOL) 17.7 ng/mL   3.10-17.50   H           

  





THYROID STIMULATING HORMONE2020-10-05 19:55:00* 



             Test Item    Value        Reference Range Interpretation Comments

 

             THYROID STIMULATING HORMONE (test code = TSH) 0.362 uIU/mL 0.36-3.7

4    N            TSH 

REFERENCE RANGES:  EUTHYROID:     0.35 - 4.3 mIU/mL                       HYPO  
   :     > 5.5      mIU/mL                       HYPER    :     < 0.35     
mIU/mL





VITAMIN B6/PYRIDOXINE2020-10-05 19:55:00* 



             Test Item    Value        Reference Range Interpretation Comments

 

             VITAMIN B6/PYRIDOXINE (test code = VITB6)  ng/mL       3-60        

               





VIT B1 WHOLE BLOOD2020-10-05 19:55:00* 



             Test Item    Value        Reference Range Interpretation Comments

 

             VIT B1 WHOLE BLOOD (test code = NUKY6VK)  nmol/L             

              





DRUGS OF ABUSE SCREEN UR2020-10-05 19:25:00* 



             Test Item    Value        Reference Range Interpretation Comments

 

             UA PH DIPSTICK (test code = MARIEL) 7.0          5.0-8.0              

      

 

             URN COCAINE (test code = COCAURN) NEGATIVE     <300 ng/mL          

       

 

             URN CANNABINOIDS (test code = CANNABURN) NEGATIVE     <50 ng/mL    

              

 

             URN AMPHETAMINE (test code = AMPHETURN) NEGATIVE     <1000 ng/mL   

             

 

             URN BARBITURATE (test code = BARBITURN) NEGATIVE     <200 ng/mL    

             

 

             URN BENZODIAZEPINE (test code = BENZOURN) NEGATIVE     <200 ng/mL  

               

 

             URN OPIATES (test code = OPIATURN) NEGATIVE     <300 ng/mL         

        

 

             URN PHENCYCLIDINE (PCP) (test code = PHENCURN) NEGATIVE     <25 ng/

mL                  

 

             URN METHADONE (test code = METHAURN) NEGATIVE     <300 ng/mL       

          





SODIUM2020-10-05 19:14:00* 



             Test Item    Value        Reference Range Interpretation Comments

 

             SODIUM (test code = NA) 124 mmol/L   136-145      L            Resu

lts called to [id number] by 

[employer] 10/05/20 1914Critical results verified and read back by Nurse? Y





HEPATIC FUNCTION PANEL2020-10-05 19:13:00* 



             Test Item    Value        Reference Range Interpretation Comments

 

             TOTAL PROTEIN (test code = PROT) 6.7 gram/dL  6.4-8.2      N       

      

 

             ALBUMIN (test code = ALB) 3.6 g/dL     3.4-5.0      N             

 

             GLOBULIN (test code = GLOB) 3.1 gram/dL  2.7-4.2      N            

 

 

             ALBUMIN/GLOBULIN RATIO (test code = A/G) 1.2          0.75-1.50    

N             

 

             BILIRUBIN TOTAL (test code = BILT) 0.40 mg/dL   0.0-1.0      N     

        

 

             BILIRUBIN DIRECT (test code = BILD) 0.11 mg/dL   0.0-0.20     N    

         

 

             SGOT/AST (test code = AST) 20 IUnit/L   15-37        N             

 

             SGPT/ALT (test code = ALT) 17 IUnit/L   12-78        N             

 

             ALKALINE PHOSPHATASE TOTAL (test code = ALKP) 61 IUnit/L     

     N            **Note change 

in reference range due to change in reagent.**





VITAMIN B122020-10-05 19:13:00* 



             Test Item    Value        Reference Range Interpretation Comments

 

             VITAMIN B12 (test code = VITB12)  pg/mL       193-986              

      





FOLIC ACID2020-10-05 19:13:00* 



             Test Item    Value        Reference Range Interpretation Comments

 

             FOLIC ACID (test code = FOL)  ng/mL       3.10-17.50               

  





THYROID STIMULATING HORMONE2020-10-05 19:13:00* 



             Test Item    Value        Reference Range Interpretation Comments

 

             THYROID STIMULATING HORMONE (test code = TSH)  uIU/mL      0.36-3.7

4                  





VITAMIN B6/PYRIDOXINE2020-10-05 19:13:00* 



             Test Item    Value        Reference Range Interpretation Comments

 

             VITAMIN B6/PYRIDOXINE (test code = VITB6)  ng/mL       3-60        

               





VIT B1 WHOLE BLOOD2020-10-05 19:13:00* 



             Test Item    Value        Reference Range Interpretation Comments

 

             VIT B1 WHOLE BLOOD (test code = XEUT4GS)  nmol/L             

              





AMMONIA2020-10-05 19:11:00* 



             Test Item    Value        Reference Range Interpretation Comments

 

             AMMONIA (test code = AMM) 46 umol/L    11-32        H             





DRUGS OF ABUSE SCREEN UR2020-10-05 18:38:00* 



             Test Item    Value        Reference Range Interpretation Comments

 

             UA PH DIPSTICK (test code = MARIEL) 7.0          5.0-8.0              

      

 

             URN COCAINE (test code = COCAURN)              <300 ng/mL          

       

 

             URN CANNABINOIDS (test code = CANNABURN)              <50 ng/mL    

              

 

             URN AMPHETAMINE (test code = AMPHETURN)              <1000 ng/mL   

             

 

             URN BARBITURATE (test code = BARBITURN)              <200 ng/mL    

             

 

             URN BENZODIAZEPINE (test code = BENZOURN)              <200 ng/mL  

               

 

             URN OPIATES (test code = OPIATURN)              <300 ng/mL         

        

 

             URN PHENCYCLIDINE (PCP) (test code = PHENCURN)              <25 ng/

mL                  

 

             URN METHADONE (test code = METHAURN)              <300 ng/mL       

          





UR NA,RANDOM2020-10-05 17:59:00* 



             Test Item    Value        Reference Range Interpretation Comments

 

             UR NA,RANDOM (test code = AMRITA) 63 mmol/L           N         

    





UR OSMOLALITY RANDOM2020-10-05 17:59:00* 



             Test Item    Value        Reference Range Interpretation Comments

 

             UR OSMOLALITY RANDOM (test code = OSMOU) 254.5 mOsm/kg       

 N             





UR NA,RANDOM2020-10-05 17:56:00* 



             Test Item    Value        Reference Range Interpretation Comments

 

             UR NA,RANDOM (test code = AMRITA) 63 mmol/L           N         

    





UR OSMOLALITY RANDOM2020-10-05 17:56:00* 



             Test Item    Value        Reference Range Interpretation Comments

 

             UR OSMOLALITY RANDOM (test code = OSMOU)  mOsm/kg            

              





GLUBED2020-10-05 16:24:00* 



             Test Item    Value        Reference Range Interpretation Comments

 

             GLUBED (test code = GLUBED) 105 mg/dL           N            

Performed by certified  

at [facility]





- CTA NECK2020-10-05 14:19:00  Name: AMARILYS AMADOR                      [facility]                     : 1945 Age/S: 74  / M         [address] [facility]                Unit #: [id number]     Loc:               [place],  
[place]  [address]              Phys: Mandi Conway  NP                             
                 Acct: [id number]  Dis Date:               Status: ADM IN     
                             PHONE #: [phone]     Exam Date: 10/05/2020  
1224                     FAX #: [phone]      Reason: AMS, TIA              
                              EXAMS:                                            
   CPT CODE:      [id number] CTA NECK                                   [address]    
                HISTORY: AMS, TIA               TECHNIQUE: Cerebral and Cervical
 CT angiography was acquired in the       axial plane after bolus IV 
administration of iodinated contrast.       Multiplanar, maximum intensity 
projection (MIP), and volume rendered       reconstructions were created on the 
3-D workstation. Automated       exposure control for dose reduction.           
    COMPARISON: CT scan of the brain earlier this morning               
FINDINGS:               Bilateral distal cervical ICAs are patent. However there
 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  
     on the left side and extends into the left internal carotid artery.       
However there is no evidence of significant stenosis on either side       (left 
internal carotid artery demonstrates 35% stenosis from       atherosclerosis).  
     Atherosclerotic calcifications without significant stenosis involving      
 the bilateral cavernous ICA segments. Bilateral petrous and       supraclinoid 
ICA segments are patent. Normal enhancement of the       bilateral ophthalmic 
arteries. Bilateral A1 and distal TOAN segments       are patent. Anterior 
communicating artery is present. Bilateral M1 and       distal MCA branches are 
patent. No aneurysm.               Bilateral distal vertebral arteries are 
patent although the left       vertebral artery is hypoplastic compared to the 
right. Basilar artery       is patent. Bilateral PICAs and SCAs are patent. Norm
al appearance of       the basilar tip. Bilateral P1 and distal PCA segments are
 patent.        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 No aneu
rysm.               Dural venous sinuses and proximal internal jugular veins are
 patent.                Please see the CT scan of the brain earlier today for no
nvascular       findings                         IMPRESSION:                   A
therosclerotic disease in the bilateral carotid bulbs without         significan
t foraminal narrowing.         Variant anatomy of the intracranial arteries with
out occlusion or         stenosis.                   Location: [facility]     PAGE  1  
                     Signed Report                    (CONTINUED)   Name: AMARILYS DAVIS [facility]                     : 1945 A
ge/S: 74  / M         [address]                Unit #: [id number]     Loc
:               [place]  [address]              Phys: Mandi Conway NP  
                                            Acct: [id number]  Dis Date:       
        Status: ADM IN                                  PHONE #: [phone]   
  Exam Date: 10/05/2020  1224                     FAX #: [phone]      Reaso
n: AMS, TIA                                            EXAMS:                   
                            CPT CODE:      [id number] CTA NECK                   
                [address]               <Continued>                     ** 
Electronically Signed by Aleksander Pham MD on 10/05/2020 at 1419 **                
      Reported and signed by: Aleksander Pham MD                                    
 CC: Nikolas Mario MD; Mandi Conway NP                               
                                                             
Technologist:Driss Zamarripa RT(R),(MR),(CT)   CTDI:        DLP:        Trnscb 
Date/Time: 10/05/2020 () [id number].[email or web address]                       Orig Print D/T: S:
 10/05/2020 ()      PAGE  2                       Signed Report             
                  - CTA HEAD2020-10-05 14:19:00  Name: AMARILYS AMADOR             
         [facility]                     : 1945 Age/S: 74  / M      
   [address]                Unit #: [id number]     Loc:               
[place]  [address]              Phys: Mandi Conway NP                  
                            Acct: [id number]  Dis Date:               Status: 
ADM IN                                  PHONE #: [phone]     Exam Date: 
10/05/2020  1224                     FAX #: [phone]      Reason: AMS, TIA  
                                          EXAMS:                                
               CPT CODE:      [id number] CTA HEAD                                
   [address]                    HISTORY: AMS, TIA               TECHNIQUE: Cerebral 
and Cervical CT angiography was acquired in the       axial plane after bolus IV
 administration of iodinated contrast.       Multiplanar, maximum intensity 
projection (MIP), and volume rendered       reconstructions were created on the 
3-D workstation. Automated       exposure control for dose reduction.           
    COMPARISON: CT scan of the brain earlier this morning               
FINDINGS:               Bilateral distal cervical ICAs are patent. However there
 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  
     on the left side and extends into the left internal carotid artery.       
However there is no evidence of significant stenosis on either side       (left 
internal carotid artery demonstrates 35% stenosis from       atherosclerosis).  
     Atherosclerotic calcifications without significant stenosis involving      
 the bilateral cavernous ICA segments. Bilateral petrous and       supraclinoid 
ICA segments are patent. Normal enhancement of the       bilateral ophthalmic 
arteries. Bilateral A1 and distal TOAN segments       are patent. Anterior 
communicating artery is present. Bilateral M1 and       distal MCA branches are 
patent. No aneurysm.               Bilateral distal vertebral arteries are 
patent although the left       vertebral artery is hypoplastic compared to the 
right. Basilar artery       is patent. Bilateral PICAs and SCAs are patent. Norm
al appearance of       the basilar tip. Bilateral P1 and distal PCA segments are
 patent.        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 No aneu
rysm.               Dural venous sinuses and proximal internal jugular veins are
 patent.                Please see the CT scan of the brain earlier today for no
nvascular       findings                         IMPRESSION:                   A
therosclerotic disease in the bilateral carotid bulbs without         significan
t foraminal narrowing.         Variant anatomy of the intracranial arteries with
out occlusion or         stenosis.                   Location: [facility]     PAGE  1  
                     Signed Report                    (CONTINUED)   Name: AMARILYS DAVIS                      [facility]                     : 1945 A
ge/S: 74  / M         [employer]                Unit #: [id number]     Loc
:               [place]  [address]              Phys: Madni Conway NP  
                                            Acct: [id number]  Dis Date:       
        Status: ADM IN                                  PHONE #: [phone]   
  Exam Date: 10/05/2020  1224                     FAX #: [phone]      Reaso
n: AMS, TIA                                            EXAMS:                   
                            CPT CODE:      [id number] CTA HEAD                   
                [id number]               <Continued>                     ** 
Electronically Signed by Aleksander Pham MD on 10/05/2020 at 1419 **                
      Reported and signed by: Aleksander Pham MD                                    
 CC: Nikolas Mario MD; Mandi Conway NP                               
                                                             
Technologist:Driss Zamarripa RT(R),(MR),(CT)   CTDI:        DLP:        Trnscb 
Date/Time: 10/05/2020 (1419) [email or web address]                       Orig Print D/T: S:
 10/05/2020 (9161)      PAGE  2                       Signed Report             
                  URINALYSIS COMPLETE2020-10-05 13:09:00* 



             Test Item    Value        Reference Range Interpretation Comments

 

             UA COLOR (test code = COLU) Light-Yellow YELLOW                    

 

 

             UA APPEARANCE (test code = APPU) CLEAR        CLEAR                

      

 

             UA GLUCOSE DIPSTICK (test code = DGLUU) NEGATIVE mg/dL NEGATIVE    

               

 

             UA BILIRUBIN DIPSTICK (test code = BILU) NEGATIVE mg/dL NEGATIVE   

                

 

             UA KETONE DIPSTICK (test code = KETU) NEGATIVE mg/dL NEGATIVE      

             

 

             UA SPECIFIC GRAVITY (test code = SGU) 1.008        1.001-1.035     

           

 

             UA BLOOD DIPSTICK (test code = MARIE) Negative mg/dL NEGATIVE        

           

 

             UA PH DIPSTICK (test code = MARIEL) 7.0          5.0-8.0              

      

 

             UA PROTEIN DIPSTICK (test code = PROU) 10 (Trace) mg/dL NEGATIVE   

  A             

 

             UA UROBILINIOGEN DIPSTICK (test code = URO) Normal mg/dL NEGATIVE  

                 

 

             UA NITRITE DIPSTICK (test code = ANKITA) NEGATIVE     NEGATIVE       

            

 

             UA LEUKOCYTE ESTERASE W REFLEX (test code = LEUUR) NEGATIVE Elizabeth/uL 

NEGATIVE                   

 

             UA WBC (test code = WBCU)  per HPF     0-5                        

 

             UA RBC (test code = RBCU)  per HPF     0-5                        

 

             UA EPITHELIAL CELLS (test code = EPIU)  per HPF     Few            

            

 

             UA BACTERIA (test code = BACU)  per HPF     NONE                   

    





Urine Source? Clean CatchURINALYSIS COMPLETE2020-10- 13:09:00* 



             Test Item    Value        Reference Range Interpretation Comments

 

             UA COLOR (test code = COLU) Light-Yellow YELLOW                    

 

 

             UA APPEARANCE (test code = APPU) CLEAR        CLEAR                

      

 

             UA GLUCOSE DIPSTICK (test code = DGLUU) NEGATIVE mg/dL NEGATIVE    

               

 

             UA BILIRUBIN DIPSTICK (test code = BILU) NEGATIVE mg/dL NEGATIVE   

                

 

             UA KETONE DIPSTICK (test code = KETU) NEGATIVE mg/dL NEGATIVE      

             

 

             UA SPECIFIC GRAVITY (test code = SGU) 1.008        1.001-1.035     

           

 

             UA BLOOD DIPSTICK (test code = MARIE) Negative mg/dL NEGATIVE        

           

 

             UA PH DIPSTICK (test code = MARIEL) 7.0          5.0-8.0              

      

 

             UA PROTEIN DIPSTICK (test code = PROU) 10 (Trace) mg/dL NEGATIVE   

  A             

 

             UA UROBILINIOGEN DIPSTICK (test code = URO) Normal mg/dL NEGATIVE  

                 

 

             UA NITRITE DIPSTICK (test code = ANKITA) NEGATIVE     NEGATIVE       

            

 

             UA LEUKOCYTE ESTERASE W REFLEX (test code = LEUUR) NEGATIVE Elizabeth/uL 

NEGATIVE                   

 

             UA WBC (test code = WBCU) 0-5 per HPF  0-5                        

 

             UA RBC (test code = RBCU) 0-3 #/HPF    0-5                        

 

             UA EPITHELIAL CELLS (test code = EPIU) FEW per HPF  FEW            

            

 

             UA BACTERIA (test code = BACU) FEW #/HPF    NONE                   

    





Urine Source? Clean CatchBASIC METABOLIC PANEL2020-10-05 04:27:00* 



             Test Item    Value        Reference Range Interpretation Comments

 

             SODIUM (test code = NA) 121 mmol/L   136-145      LL           Resu

lts called to [id number] by 

V.[email or web address].GP 10/05/20 0427Critical results verified and read back by Nurse? Y

 

             POTASSIUM (test code = K) 4.0 mmol/L   3.5-5.1      N             

 

             CHLORIDE (test code = CL) 87.0 mmol/L         L             

 

             CARBON DIOXIDE (test code = CO2) 25.0 mmol/L  21-32        N       

      

 

             ANION GAP (test code = GAP) 13.0         10-20        N            

 

 

             GLUCOSE (test code = GLU) 105 mg/dL           N             

 

             BLOOD UREA NITROGEN (test code = BUN) 8 mg/dL      7-18         N  

           

 

             GLOMERULAR FILTRATION RATE (test code = GFR) > 60 mL/min  >=60     

                 Estimated GFR by

 using Modified MDRD formula.Chronic kidney disease is defined as either kidney 
damageor GFR <60 mL/min/1.73 m2 for >3 months.

 

             CREATININE (test code = CREAT) 0.70 mg/dL   0.7-1.3      N         

    

 

             BUN/CREATININE RATIO (test code = BUN/CREA) 11.5         10-20     

   N             

 

             CALCIUM (test code = CA) 8.4 mg/dL    8.5-10.1     L             





PROTHROMBIN [id number] 03:45:00* 



             Test Item    Value        Reference Range Interpretation Comments

 

             PROTHROMBIN TIME PATIENT (test code = PTP) 11.3 seconds 9.0-14.0   

  N             

 

             INTERNATIONAL NORMAL RATIO (test code = INR) 1.0          0.8-1.2  

    N            The therapeutic range

 for oral anticoagulant therapy formost indications is an international 
normalized ratio (INR)of between 2.0 and 3.0.  The recommended therapeutic 
INRrange for various clinical situations is listed 
below:_________________________________________________________Clinical 
Situation                          INR 
range_________________________________________________________ Pulmonary e
mbolism treatment              (2.0-3.0)Venous thrombosis treatmentVenous 
thrombosis prophylaxis (high risk surgery)Prevention of systemic embolism from: 
        Acute myocardial infarction         Valvular heart disease         
Atrial fibrillation Mechanical prosthetic heart valves          (2.5-3.5)





IS PATIENT ON ANTICOAGULANTS? NTHROMBOPLASTIN TIME PARTIAL2020-10-05 03:45:00* 



             Test Item    Value        Reference Range Interpretation Comments

 

             THROMBOPLASTIN TIME PARTIAL (test code = PTT) 27.4 seconds 23.0-37.

0    N             





IS PATIENT ON ANTICOAGULANTS? NCBC W/O DIFF2020-10-05 03:42:00* 



             Test Item    Value        Reference Range Interpretation Comments

 

             WHITE BLOOD CELL (test code = WBC) 5.5 K/mm3    4.5-12.5     N     

        

 

             RED BLOOD CELL (test code = RBC) 3.85 mill/mm3 4.0-5.8      L      

       

 

             HEMOGLOBIN (test code = HGB) 12.1 gram/dL 13.0-17.5    L           

  

 

             HEMATOCRIT (test code = HCT) 34.3 %       42.0-52.0    L           

  

 

             MEAN CELL VOLUME (test code = MCV) 89.1 fL      80-98        N     

        

 

             MEAN CELL HGB (test code = MCH) 31.4 picogram 27.0-33.0    N       

      

 

             MEAN CELL HGB CONCETRATION (test code = MCHC) 35.3 gram/dL 33.0-36.

0    N             

 

             RED CELL DISTRIBUTION WIDTH (test code = RDW) 12.2 %       11.6-16.

2    N             

 

             PLATELET COUNT (test code = PLT) 218 K/mm3    150-450      N       

      

 

             MEAN PLATELET VOLUME (test code = MPV) 9.0 fL       6.7-11.0     N 

            





- CT C-SPINE W/O CONTRAST2020-10-05 03:24:00  Name: AMARILYS AMADOR                
      [facility]                     : 1945 Age/S: 74  / M         
[address] [facility]                Unit #: [id number]     Loc:               
[place]  [address]              Phys: Jennifer Quiles MD                   
                            Acct: [id number]  Dis Date:               Status: 
REG ER                                  PHONE #: [phone]     Exam Date: 
10/05/2020  0312                     FAX #: [phone]      Reason: Neck Pain 
                                          EXAMS:                                
               CPT CODE:      [id number] CT C-SPINE W/O CONTRAST                 
   [id number]                    AFTER HOURS SERVICE ON: 10/5/2020 3:23 AM           
    CT of the Cervical Spine Without Contrast               Location Code M12   
            History: Neck Pain               Technique:  Scans were obtained on 
a helical scanner pre IV contrast       only.                One or more of the 
following dose reduction techniques were used:       Automated exposure control,
 adjustment of the mA and/or kV according       to patient size, and/or 
utilization of iterative reconstruction       technique.                
Findings:               Craniocervical junction is intact.  Atlantoaxial joint 
is       unremarkable. C1 ring is normal. Dens is intact. Transverse processes, 
      pedicles and lamina are intact. No compression fracture or pathologic     
  lesions.  There is mild cervical spondylosis.  There is no significant       
central canal stenosis.  Multilevel mild to moderate bilateral       foraminal 
stenoses are seen due to uncovertebral hypertrophy.                 Impression: 
                  No cervical spine fracture.                 ** Electronically 
Signed by Pro Marin M.D. **         **               on 10/05/2020 at 
0324               **                      Reported and signed by: Pro Marin M.D.          CC: Jennifer Quiles MD                                
                                                                                
   Technologist:RT BRIAN            CTDI:        DLP:        Trnscb 
Date/Time: 10/05/2020 (324) t.[email or web address].MA50                       Orig Print D/T: S:
 10/05/2020 (0327)      PAGE  1                       Signed Report             
                  - CT HEAD/BRAIN W/O CONT2020-10-05 03:20:00  Name: 
AMARILYS AMADOR                      [facility]                     : 
1945 Age/S: 74  / M         [address] [facility]                Unit #: [id number]
[id number]     Loc:               [place]  [address]              Phys: Jennifer Quiles MD                                               Acct: [id number]  Di
s Date:               Status: REG ER                                  PHONE #: [phone]
[phone]     Exam Date: 10/05/2020  0312                     FAX #: [phone]
[phone]      Reason: HEADACHE                                            EXAMS:     
                                          CPT CODE:      [id number] CT HEAD/BRAIN
 W/O CONT                     [id number]                    AFTER HOURS SERVICE ON: 1
 3:19 AM               CT Scan of the Brain Without Contrast            
   Location Code M12               History: HEADACHE               Technique:  S
cans were performed on a helical scanner pre IV contrast       only. The study i
s limited secondary to lack of intravenous contrast,       particularly for eval
uation of masses.                One or more of the following dose reduction richa
hniques were used:       Automated exposure control, adjustment of the mA and/or
 kV according       to patient size, and/or utilization of iterative reconstruct
ion       technique.               Findings:                There is no hydrocep
halus. Basal cisterns are patent. There is no       intracranial hyperdense hemo
rrhage. There is no midline shift or mass       effect. No effacement of the gra
y-white matter junction to indicate       acute infarction. There are chronic is
chemic white matter changes.        There is a posterior scalp laceration.  Ther
e is no skull fracture.                 Impression:                   Posterior 
scalp laceration.  No skull fracture or intracranial         hemorrhage.        
         ** Electronically Signed by Pro Marin M.D. **         **     
          on 10/05/2020 at 0320               **                      Reported a
nd signed by: Pro Marin M.D.         CC: Jennifer Quiles MD        
                                                                                
                           Technologist:RT BRIAN            CTDI:   
     DLP:        Trnscb Date/Time: 10/05/2020 (320) LeoR.[email or web address]                 
      Orig Print D/T: S: 10/05/2020 (323)      PAGE  1                       Si
gned Report                               Serum or plasma sodium measurement 
(moles/volume)2020 04:35:00* 



             Test Item    Value        Reference Range Interpretation Comments

 

             Sodium Level (test code = 2951-2) 132          136-145             

       





[facility] potassium [facility] 
(moles/volume)2020 04:35:00* 



             Test Item    Value        Reference Range Interpretation Comments

 

             Potassium Level (test code = 2823-3) 3.7          3.5-5.1          

          





[facility] or plasma chloride measurement 
(moles/volume)2020 04:35:00* 



             Test Item    Value        Reference Range Interpretation Comments

 

             Chloride Level (test code = 2075-0) 98                       

         





[facility] or plasma carbon dioxide, total 
measurement (moles/volume)2020 04:35:00* 



             Test Item    Value        Reference Range Interpretation Comments

 

             Carbon Dioxide Level (test code = 2028-9) 26           22-29       

               





[facility] or plasma anion [id number] 
04:35:00* 



             Test Item    Value        Reference Range Interpretation Comments

 

             Anion Gap (test code = 33037-3) 11.7         8-16                  

     





[facility] or plasma urea nitrogen measurement
 (mass/volume)2020 04:35:00* 



             Test Item    Value        Reference Range Interpretation Comments

 

             Blood Urea Nitrogen (test code = 3094-0) 11           7-26         

              





[facility] or plasma creatinine measurement 
(mass/volume)2020 04:35:00* 



             Test Item    Value        Reference Range Interpretation Comments

 

             Creatinine (test code = 2160-0) 0.70         0.72-1.25             

     





[facility] or plasma urea nitrogen/creatinine 
mass isfxs0946-02-96 04:35:00* 



             Test Item    Value        Reference Range Interpretation Comments

 

             BUN/Creatinine Ratio (test code = 3097-3) 16           6-25        

               





[facility] CenterEstimated glomerular filtration rate 
(GFR) yoyzwvuaobwfh5245-95-80 04:35:00* 



             Test Item    Value        Reference Range Interpretation Comments

 

             Estimat Glomerular Filtration Rate (test code = 113349435) > 60    

     >60                        





Ranges were taken from the National Kidney Disease Education Program and the [facility] literature.Reference ranges:60 or greater: Sagvwv93-63 (
for 3 consecutive months): Chronic kidney disease 15 or less: Kidney failureSt. David's Medical CenterGlucose kwfsakaizjk7012-62-93 04:35:00* 



             Test Item    Value        Reference Range Interpretation Comments

 

             Glucose Level (test code = UPK1638) 92                       

         





[facility] or plasma calcium measurement 
(mass/volume)2020 04:35:00* 



             Test Item    Value        Reference Range Interpretation Comments

 

             Calcium Level (test code = 47402-0) 8.8          8.4-10.2          

         





[facility] CenterBlood cobalamin (vitamin B12) measurement
 (mass/volume)2020 12:05:00* 



             Test Item    Value        Reference Range Interpretation Comments

 

             Vitamin B12 Level (test code = 39709-1) 1000         213-816       

             





[facility] CenterBlMay[facility] Clinic Hospital leukocytes automated count 
(number/volume)2020 04:30:00* 



             Test Item    Value        Reference Range Interpretation Comments

 

             White Blood Count (test code = 6690-2) 4.84         4.8-10.8       

            





[facility] CenterBlMay[facility] Hospital erythrocytes automated count 
(number/volume)2020 04:30:00* 



             Test Item    Value        Reference Range Interpretation Comments

 

             Red Blood Count (test code = 789-8) 3.89         4.3-5.7           

         





[facility] hemoglobin measurement 
(moles/volume)2020 04:30:00* 



             Test Item    Value        Reference Range Interpretation Comments

 

             Hemoglobin (test code = 38353-3) 12.7         14.0-18.0            

      





[facility] CenterAutFormerly McDowell Hospitaled blood hematocrit (volume 
fraction)2020 04:30:00* 



             Test Item    Value        Reference Range Interpretation Comments

 

             Hematocrit (test code = 4544-3) 35.9         38.2-49.6             

     





[facility] Medical CenterAutomated erythrocyte mean corpuscular 
mefrpc1196-88-95 04:30:00* 



             Test Item    Value        Reference Range Interpretation Comments

 

             Mean Corpuscular Volume (test code = 787-2) 92.3         81-99     

                 





[facility] CenterAutomated erythrocyte mean corpuscular 
hemoglobin (mass per erythrocyte)2020 04:30:00* 



             Test Item    Value        Reference Range Interpretation Comments

 

             Mean Corpuscular Hemoglobin (test code = 785-6) 32.6         28-32 

                     





[facility] Medical CenterAutomated erythrocyte mean corpuscular 
hemoglobin concentration measurement (mass/volume)2020 04:30:00* 



             Test Item    Value        Reference Range Interpretation Comments

 

             Mean Corpuscular Hemoglobin Concent (test code = 786-4) 35.4       

  31-35                      





[facility] Medical CenterRDW MebSq-Gde2456-35-27 04:30:00* 



             Test Item    Value        Reference Range Interpretation Comments

 

             Red Cell Distribution Width (test code = 77405-1) 12.8         11.7

-14.4                  





[facility] CenterAutomated blood platelet count 
(count/volume)2020 04:30:00* 



             Test Item    Value        Reference Range Interpretation Comments

 

             Platelet Count (test code = 777-3) 230          140-360            

        





[facility] Medical CenterAutomated blood segmented neutrophil 
count as percentage of total dsyauubisp9561-70-11 04:30:00* 



             Test Item    Value        Reference Range Interpretation Comments

 

             Neutrophils (%) (Auto) (test code = 66725-1) 83.3         38.7-80.0

                  





[facility] Medical CenterAutomated blood lymphocyte count as 
percentage ot total cscfvgmttq8936-54-06 04:30:00* 



             Test Item    Value        Reference Range Interpretation Comments

 

             Lymphocytes (%) (Auto) (test code = 736-9) 12.6         18.0-39.1  

                





[facility] Medical CenterAutomated blood monocyte count as 
percentage of total htmmasulge3490-44-26 04:30:00* 



             Test Item    Value        Reference Range Interpretation Comments

 

             Monocytes (%) (Auto) (test code = 5905-5) 3.5          4.4-11.3    

               





[facility] CenterAutomated blood eosinophil count as 
percentage of total pdnptqahuw3168-36-84 04:30:00* 



             Test Item    Value        Reference Range Interpretation Comments

 

             Eosinophils (%) (Auto) (test code = 713-8) 0.0          0.0-6.0    

                





[facility] Medical CenterAutomated blood basophil count as 
percentage of total ynqxweqjrk5963-98-87 04:30:00* 



             Test Item    Value        Reference Range Interpretation Comments

 

             Basophils (%) (Auto) (test code = 706-2) 0.4          0.0-1.0      

              





[facility] Medical CenterFluoroscopic procedure less than one hour
 wfblmjgx2445-51-36 04:30:00* 



             Test Item    Value        Reference Range Interpretation Comments

 

             IM GRANULOCYTES % (test code = IM GRANULOCYTES %) 0.2          0.0-

1.0                    





[facility] Medical CenterAutomated blood neutrophil count
2020 04:30:00* 



             Test Item    Value        Reference Range Interpretation Comments

 

             Neutrophils # (Auto) (test code = 751-8) 4.0          2.1-6.9      

              





[facility] CenterBlood lymphocytes count (number/volume)
2020 04:30:00* 



             Test Item    Value        Reference Range Interpretation Comments

 

             Lymphocytes # (Auto) (test code = 88186-6) 0.6          1.0-3.2    

                





[facility] CenterBlood monocytes automated count 
(number/volume)2020 04:30:00* 



             Test Item    Value        Reference Range Interpretation Comments

 

             Monocytes # (Auto) (test code = 742-7) 0.2          0.2-0.8        

            





[facility] CenterAutomated blood eosinophil count
2020 04:30:00* 



             Test Item    Value        Reference Range Interpretation Comments

 

             Eosinophils # (Auto) (test code = 711-2) 0.0          0.0-0.4      

              





[facility] CenterAutomated blood basophil count 
(count/volume)2020 04:30:00* 



             Test Item    Value        Reference Range Interpretation Comments

 

             Basophils # (Auto) (test code = 704-7) 0.0          0.0-0.1        

            





[facility] Medical CenterFluoroscopic procedure less than one hour
 ykibtfwd8041-26-74 04:30:00* 



             Test Item    Value        Reference Range Interpretation Comments

 

                                        Absolute Immature Granulocyte (auto (linus

t code = Absolute Immature Granulocyte 

(auto)          0.01            0-0.1                            





[facility] Medical CenterFree thyroxine itjif0758-80-98 15:33:00* 



             Test Item    Value        Reference Range Interpretation Comments

 

             Free Thyroxine Index (test code = 74183-0) 1.7866       1.4-3.8    

                





[facility] Campuserum or plasma thyroxine (T4) 
measurement (mass/volume)2020 15:33:00* 



             Test Item    Value        Reference Range Interpretation Comments

 

             Thyroxine (T4) (test code = 3026-2) 6.18         4.5-10.9          

         





[facility] Campuserum or plasma triiodothyronine resin 
uptake (T3RU)2020 15:33:00* 



             Test Item    Value        Reference Range Interpretation Comments

 

             Triiodothyronine (T3) Uptake (test code = 3050-2) 28.91        22.5

-37.0                  





[facility] Campuserum or plasma thyrotropin measurement 
by detection limit <= 0.005 miu/l (units/volume)2020 15:33:00* 



             Test Item    Value        Reference Range Interpretation Comments

 

             Thyroid Stimulating Hormone (TSH) (test code = 87643-5) 0.399      

  0.350-4.940                





[facility] Campuserum nuclear antibody titer by 
uxxbxtzglwgstgcxie1178-67-69 15:33:00* 



             Test Item    Value        Reference Range Interpretation Comments

 

             Anti-Nuclear Antibody Screen (test code = 5048-4) Negative     .   

                       





                                     Negative   <1:80                           
          Borderline  1:80                                     Positive   >
1:80Performed at:  [facility]  [id number]
3Lab Director: Wolf Aragon MD, Phone:  [phone]. David's Medical CenterCT BRAIN [id number] 11:44:00                                           
                                           [facility]    
                    [address] [place] [address]   
   Patient Name: AMARILYS AMADOR                                MR #: [id number] 
                 : 1945                                   Age/Sex: 74/M
  Acct #: [id number]                              Req #: [id number]  Adm 
Physician: ELEAZAR PURI MD                                        Ordered by: 
ELEAZAR PURI MD                         Report #: [id number]        Location[facility]
                                    Room/Bed: [place] [address]        
______________________________________________________________
_____________________________________    Procedure: [id number] CT/CT BRAIN WO  Ex
am Date: 20                            Exam Time: 1115                    
                          REPORT STATUS: Signed    Exam: Head CT without contras
t   History:  Weakness, hyponatremia   Comparison studies:  Head CT 2020   
   Technique:   Axial images were obtained from the skull base to the vertex.   
Coronal and sagittal images reconstructed from the axial data.  Dose   modulatio
n, iterative reconstruction, and/or weight based adjustment of the   mA/kV was u
tilized to reduce the radiation dose to as low as reasonably   achievable.      
  Radiation dose:    Total DLP: 921.4 mGy*cm.    Estimated effective dose: DLP x
 0.015    Intravenous contrast: None      Findings:      Scalp: No abnormalities
.   Bones: No fractures, blastic or lytic lesions.      Brain sulci: Mild from. 
  Ventricles: Mild compensatory dilatation. No hydrocephalus.      Extra-axial s
paces: Prominent extra-axial space similar to CSF density within   the prepontin
e and perimedullary cisterns may be due to an arachnoid cyst or   possibly secon
michael to ectatic right vertebral and basilar artery.      Parenchyma:    Ill-defi
balwinder and confluent hypodensities in the supratentorial white matter are   nonspec
ific but are most compatible with chronic microvascular ischemic   changes. Unch
anged chronic lacunar infarct in the posterior limb of the left   internal capsu
le.      Sellar/suprasellar region: No abnormalities.   Craniocervical junction:
 Patent foramen magnum. No Chiari one malformation.      Additional findings:   
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
erosclerosis.      IMPRESSION:       No acute intracranial abnormalities.   No c
hanges from the prior head CT of 2020.      Chronic findings:   1.  Mild ge
neralized parenchymal volume loss.   2.  Chronic left thalamic lacunar infarct. 
  3.  Moderate microvascular ischemic changes.      Signed by: Dr. Raymond Jacinto M.D. on 2020 11:50 AM        Dictated By: RAYMOND JACINTO MD  [employer]
ll Signed By: RAYMOND JACINTO MD on 20 1150  Transcribed By: ZELDA on 0
20 1150       COPY TO:   ELEAZAR PURI MD         Urine sodium measurement 
(moles/volume)2020 08:09:00* 



             Test Item    Value        Reference Range Interpretation Comments

 

             Urine Random Sodium (test code = 2955-3) 97                        

              





[id number] 13[facility]27[facility]00           
                                                                           [place] [address]      Patient Name: AMARILYS AMADOR           
                     MR #: [id number]                  : 1945          
                         Age/Sex: 74/M  Acct #: [id number]                    
          Req #: [id number]  Adm Physician: ELEAZAR PURI MD                       
                 Ordered by: ELEAZAR PURI MD                         Report #: 
[id number]        Location: [facility]                                    Room/Bed: 
[facility] 198-1        ______________________________________________________________
_____________________________________    Procedure: [id number] CT/CT CHEST WO  Ex
am Date: 20                            Exam Time: 1310                    
                          REPORT STATUS: Signed    EXAM: CT Chest WITHOUT intrav
enous contrast 2020 1:07 PM   INDICATION:     sob   COMPARISON: X-ray dated
 2020   TECHNIQUE:   Chest was scanned utilizing a multidetector helical sc
ainsley from the lung apex   through the level of the adrenal glands without admin
istration of IV contrast.   Coronal and sagittal reformations were obtained. Rou
glenn protocol was   performed.      IV CONTRAST: None   RADIATION DOSE: Total DL
P: 421 mGy*cm. Dose modulation, iterative   reconstruction, and/or weight based 
adjustment of the mA/kV was utilized to   reduce the radiation dose to as low as
 reasonably achievable.    COMPLICATIONS: None      FINDINGS:      LINES/ TUBES:
 None.      LUNGS AND AIRWAYS:  Large airways are clear. There is lower lobe pre
dominant   peripheral interstitial scarring. Honeycombing is noted at the lung b
ases with   traction bronchiectasis. Interlobular septal thickening is noted.   
   PLEURA: The pleural spaces are clear. Pleural thickening is noted at the lung
   bases.      HEART AND MEDIASTINUM: The thyroid gland is normal.  No mediastin
al, hilar or   axillary lymphadenopathy. Multiple nonenlarged mediastinal lymph 
nodes are   noted. The heart is normal in size. There is no pericardial effusion
.    Scattered coronary artery calcifications are noted. After scarring   calcif
ications of the aortic arch are noted.      UPPER ABDOMEN: Unremarkable.      BAYLEE
URSULA: Negative for acute osseous abnormality. Osseous structures are mildly   dem
ineralized. Mild multilevel degenerative changes are noted. No suspicious   lyti
c or blastic lesion is identified.      SOFT TISSUES: Unremarkable.      IMPRESS
ION:    Constellation of findings concerning for chronic interstitial lung disea
se.    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
sis.   Although no definite suspicious infectious infiltrate is identified lack 
of   comparison imaging does limit evaluation.   Consider short-term follow-up i
n 3-6 months to evaluate for stability.      Signed by: Trey Jorge MD on 2020 1:35 PM        Dictated By: TREY JORGE MD  Electronically Signed By: LOLITA JORGE MD on 20  Transcribed By: ZELDA on 20 133       C
OPY TO:   ELEAZAR PURI MD         Serum or plasma creatine kinase measurement 
(enzymatic activity/volume)2020 12:35:00* 



             Test Item    Value        Reference Range Interpretation Comments

 

             Creatine Kinase (test code = 2157-6) 195                     

          





[facility] creatine kinase MB 
measurement (mass/volume)2020 12:35:00* 



             Test Item    Value        Reference Range Interpretation Comments

 

             Creatine Kinase MB (test code = 54388-7) 4.10         0-5.0        

              





[facility] Center[facility] by highly 
sensitive enzyme [id number] 12:35:00* 



             Test Item    Value        Reference Range Interpretation Comments

 

             Troponin I (test code = 34552-5) 0.005        0-0.300              

      





[facility] CenterOsmolality of Serum or Plasma by 
[id number] 04:15:00* 



             Test Item    Value        Reference Range Interpretation Comments

 

             Serum Osmolality (test code = 27013-5) 251          278-305        

            





[facility] plasma total bilirubin 
measurement (mass/volume)2020 04:15:00* 



             Test Item    Value        Reference Range Interpretation Comments

 

             Total Bilirubin (test code = 1975-2) 0.4          0.2-1.2          

          





[facility] CenterFluoroscopic procedure less than one hour
 [id number] 04:15:00* 



             Test Item    Value        Reference Range Interpretation Comments

 

                                        Aspartate Amino Transf (AST/SGOT) (test 

code = Aspartate Amino Transf 

(AST/SGOT))     16              5-34                             





[facility] or plasma alanine aminotransferase 
measurement (enzymatic activity/volume)2020 04:15:00* 



             Test Item    Value        Reference Range Interpretation Comments

 

             Alanine Aminotransferase (ALT/SGPT) (test code = 1742-6) 7         

   0-55                       





[facility] or [facility] protein measurement 
(mass/volume)2020 04:15:00* 



             Test Item    Value        Reference Range Interpretation Comments

 

             Total Protein (test code = 2885-2) 6.1          6.5-8.1            

        





[facility] or plasma albumin measurement 
(mass/volume)2020 04:15:00* 



             Test Item    Value        Reference Range Interpretation Comments

 

             Albumin (test code = 1751-7) 3.7          3.5-5.0                  

  





[facility] Center[facility]asma globulin measurement (mass/volume)
2020 04:15:00* 



             Test Item    Value        Reference Range Interpretation Comments

 

             Globulin (test code = 60694-4) 2.4          2.3-3.5                

    





[facility] or plasma albumin/globulin mass 
[id number] 04:15:00* 



             Test Item    Value        Reference Range Interpretation Comments

 

             Albumin/Globulin Ratio (test code = 1759-0) 1.5          0.8-2.0   

                 





[facility] or plasma alkaline phosphatase 
measurement (enzymatic activity/volume)2020 04:15:00* 



             Test Item    Value        Reference Range Interpretation Comments

 

             Alkaline Phosphatase (test code = 6768-6) 57                 

               





[facility] Center[facility] 2 [id number] 20:52:00         
                                                                             [facility]                        [address] [place] [address]      Patient Name: AMARILYS AMADOR           
                     MR #: [id number]                  : 1945          
                         Age/Sex: 74/M  Acct #: [id number]                    
          Req #: [id number]  Adm Physician: ELEAZAR PURI MD                       
                 Ordered by: SENTHIL PURI DO                         Report #: 
[id number]        Location: [facility]                                  Room/Bed: 
[place] [address]          ____________________________________________________________
_______________________________________    Procedure: [id number] DX/CHEST 2 VIEWS
  Exam Date: 20                            Exam Time: 1940                
                              REPORT STATUS: Signed    EXAMINATION:  CHEST 2 VIE
WS          INDICATION: Assess post fall.      COMPARISON:  None           FINDI
NGS:        TUBES and LINES:  None.      LUNGS: Low lung volumes with multifocal
 interstitial and patchy airspace   opacities.      PLEURA:  No pleural effusion
 or pneumothorax.      HEART AND MEDIASTINUM:  The cardiomediastinal silhouette 
is enlarged.      BONES AND SOFT TISSUES:  No acute osseous lesion.  Soft tissue
s are   unremarkable.      UPPER ABDOMEN: No free air under the diaphragm.      
    IMPRESSION:      1.  Patchy airspace opacities throughout both lungs which m
ost likely   represents multifocal pneumonia.    2.  Cardiomegaly with likely jones
perimposed pulmonary edema.      Signed by: Carmen Bettencourt MD on 2020 8:54 P
M        Dictated By: CARMEN BETTENCOURT MD  Electronically Signed By: CARMEN HAIR MD on 20  Transcribed By: ZELDA on 20       COPY TO:  
 SENTHIL PURI DO         Fluoroscopic procedure less than one hour duration
2020 20:50:00* 



             Test Item    Value        Reference Range Interpretation Comments

 

             Coronavirus (PCR) (test code = Coronavirus (PCR)) NOT DETECTED NOTD

ETECTED                





SARS-CoV-2 PCRHologic Aptima SARS-CoV-2 assay is a nucleic amplification test in
tended for the qualitative detection of RNA from SARS-CoV-2 from nasopharyngeal 
(NP) specimens. It is used under Emergency Use Authorization (EUA) by FDA.A posi
tive result is indicative of the presence of SARS-CoV-2 RNA. Clinical correlatio
n with patient history and other diagnostic information is necessary to determin
e patient infection status.A negative (Not Detected) result does not preclude SA
RS-CoV-2 infection. Clinical Correlation with patient history and other diagnost
ic information should be used in patient management decisions.Invalid: Unable to
 generate a valid result on this specimen. Please submit a new specimen for repr
at testing oc clinically indicated.Tesing performed by:[facility]
[address] [place] [address] [id number], Josue rodriguez MD, PhDMethod[facility] THREE VIEWS BILATERAL
2020 20:50:00                                                             
                         [place] [address]      Patient Name: 
AMARILYS AMADOR                                MR #: [id number]                  
: 1945                                   Age/Sex: 74/M  Acct #: 
[id number]                              Req #: [id number]  Adm Physician: 
ELEAZAR PURI MD                                        Ordered by: SENTHIL PURI DO                         Report #: [id number]        Location: [facility]          
                        Room/Bed: [place] [address]          
____________________________________________________________
_______________________________________    Procedure: [id number] DX/KNEE THREE VI
EWS BILATERAL  Exam Date: 20                            Exam Time:    
                                           REPORT STATUS: Signed    Bilateral kn
ee x-rays - 3 view(s) each      HISTORY:  Pain.       COMPARISON: None available
.           FINDINGS:   RIGHT:   No displaced fracture.     The osseous alignmen
t is within normal limits.   The joint spaces are well-maintained.   The soft ti
ssues appear unremarkable.      LEFT:   No displaced fracture.     The osseous a
lignment is within normal limits.   The joint spaces are well-maintained.   The 
soft tissues appear unremarkable.         IMPRESSION:    No acute radiographic a
bnormality.      Signed by: Carmen Bettencourt MD on 2020 8:52 PM        Dictate
d By: CARMEN BETTENCOURT MD  Electronically Signed By: CARMEN BETTENCOURT MD on 20  Transcribed By: ZELDA on 20       COPY TO:   SENTHIL PURI DO
         HAND 3+ VIEWS [id number] 20:48:00                                 
                                                     [place] [address]      Patient Name: AMARILYS AMADOR                                MR 
#: [id number]                  : 1945                                  
 Age/Sex: 74/M  Acct #: [id number]                              Req #: [id number]
[id number]  Adm Physician: ELEAZAR PURI MD                                        
Ordered by: SENTHIL PURI DO                         Report #: [id number]        
[facility]                                  [facility]          
____________________________________________________________
_______________________________________    Procedure:  DX/HAND 3+ VIEWS
 RIGHT  Exam Date: 20                            Exam Time:           
                                    REPORT STATUS: Signed    X-ray 3 views of th
e hand.      HISTORY:  Pain.       COMPARISON: None available.           FINDING
S:   Bones:   No acute displaced fracture.     Osseous alignment is within todd
l limits.      Joints:   The joint spaces are well-maintained. There are degener
ative changes of   multiple interphalangeal joints.      Soft tissues:   Severe 
vascular sclerotic calcification.      IMPRESSION:       No acute fracture or di
slocation. No focal soft tissue abnormality.      Signed by: Carmen Bettencourt MD on
 2020 8:50 PM        Dictated By: CARMEN BETTENCOURT MD  Electronically Signed 
By: CARMEN BETTENCOURT MD on 20  Transcribed By: ZELDA on 20
       COPY TO:   SENTHIL PURI DO         CT CERVICAL SPINE [id number] 
20:31:00                                                                        
              [place] [address]      Patient Name: AMARILYS AMADOR                                MR #: [id number]                  : 
1945                                   Age/Sex: 74/M  Acct #: [id number]
                              Req #: [id number]  Adm Physician:                 
                                     Ordered by: SENTHIL PURI DO               
          Report #: [id number]        Location: ER                               
       Room/Bed:                   
____________________________________________________________
_______________________________________    Procedure: [id number] CT/CT CERVICAL JEANIE ERIC  Exam Date: 20                            Exam Time:          
                                     REPORT STATUS: Signed    History: Trauma   
Comparison studies: None      Technique:    Axial images were obtained through t
he cervical region..   Coronal and sagittal images reconstructed from the axial 
data.   Dose modulation, iterative reconstruction, and/or weight based adjustmen
t    of the mA/kV was utilized to reduce the radiation dose to as low as reasona
ponce    achievable.      Intravenous contrast: None      Findings:      Fractures
: None.   Soft tissues: No gross abnormalities.      Atlantoaxial articulation: 
Intact.   Alignment: Straightening of the usual lordosis is probably positional.
 No   scoliosis.   Cervicomedullary junction: No abnormalities. The foramen magn
um is patent.      Vertebrae:    No infection or neoplasm.      Degenerative angie
nges:    Moderate at the atlantoaxial articulation.   No significant degenerativ
e changes in the discs.   Mild foraminal stenosis on the right at C4-5 due to un
coarthrosis.   Bilateral facet arthrosis at C7-T1. Patent spinal canal.      Inc
idental findings:   Focal calcification of the ligamentum nuchae at C5-6.   Athe
rosclerotic calcifications (aortic arch, subclavian and brachiocephalic   arteri
es and at the carotid bulbs).      IMPRESSION:      1.  No acute abnormalities. 
     2.  Specifically, no fractures or subluxations.      3.  Cannot adequately 
evaluate for ligament, spinal cord and or vascular   abnormalities.      Signed 
by: Dr. Stanton Jose M.D. on 2020 8:36 PM        Dictated By: STANTON JOSE MD, MD  Electronically Signed By: STANTON JOSE MD, MD on 2036  Transcribed By: ZELDA on 20       COPY TO:   NIKITA PURI DO         CT LOUISA MENA/DI [id number] 20:27:00                       
                                                               [place] [address]      Patient Name: AMARILYS AMADRO           
                     MR #: [id number]                  : 1945          
                         Age/Sex: 74/M  Acct #: [id number]                    
          Req #: [id number]  Adm Physician:                                     
                 Ordered by: SENTHIL PURI DO                         Report #: 
[id number]        Location: ER                                      Room/Bed:    
               ____________________________________________________________
_______________________________________    Procedure:  CT/CT MAXIO FAC/
PARANAS WO  Exam Date: 20                            Exam Time:       
                                        REPORT STATUS: Signed    History:Fall   
Comparison studies: None      Technique:   Axial images were obtained through th
e maxillofacial region.   Coronal and sagittal images reconstructed from the axi
al data.   Dose modulation, iterative reconstruction, and/or weight based adjust
ment    of the mA/kV was utilized to reduce the radiation dose to as low as reas
onably    achievable.      Intravenous contrast: None      Findings:      Soft t
issues:    No abnormalities.      Bones:    No fractures or bone abnormalities. 
     Orbits:    Globes: Intact   Extra or intraconal abnormalities: None.      P
aranasal sinuses:    Clear      IMPRESSION:       1.  No maxillofacial abnormali
ties.   2.  Specifically, no fractures      Signed by: KENDELL Goss on 2020 8:30 PM        Dictated By: STANTON JOSE MD, MD  Electro
nically Signed By: STANTON JOSE MD, MD on 20  Transcribed By: 
ZELDA on 20       COPY TO:   SENTHIL PURI DO         CT BRAIN WO
2020 20:25:00                                                             
                         [place] [address]      Patient Name: 
AMARILYS AMADOR                                MR #: [id number]                  
: 1945                                   Age/Sex: 74/M  Acct #: 
[id number]                              Req #: [id number]  Adm Physician:     
                                                 Ordered by: SENTHIL PURI DO   
                      Report #: [id number]        Location: ER                   
                   Room/Bed:                   
____________________________________________________________
_______________________________________    Procedure: [id number] CT/CT BRAIN WO  
Exam Date: 20                            Exam Time:                   
                            REPORT STATUS: Signed       ******** ADDENDUM #1 ***
*****      Incidental 1.4 cm widening of the prepontine cistern (series 400, nitin
ge 32) is   probably due an incidental arachnoid cyst.      Signed by: Dr. Stanton Jose M.D. on 2020 8:37 PM   ******** ORIGINAL REPORT ********    
  History:Fall      Comparison studies:   None      Technique:   Axial images we
re obtained from the skull base to the vertex.   Coronal and sagittal images rec
onstructed from the axial data.   Dose modulation, iterative reconstruction, and
/or weight based adjustment    of the mA/kV was utilized to reduce the radiation
 dose to as low as reasonably    achievable.      Intravenous contrast: None    
  Findings:      Scalp/skull:    No abnormalities.      Extra-axial spaces:    N
o masses.  No fluid collections.      Brain sulci: Mildly prominent.   Ventricle
s: Mild compensatory dilatation. No hydrocephalus.      Parenchyma:    Diffuse, 
confluent hypodensities in the supratentorial white matter are small   vessel is
chemic changes.    No masses, hemorrhage, acute or chronic cortical vascular ins
ults.      Sellar/suprasellar region: No abnormalities.   Craniocervical junctio
n: Patent foramen magnum.  No Chiari one malformation.      Incidental findings:
   Tortuous and ectatic and partially calcified right vertebral and basilar   ar
teries.   Coarse calcifications in the left vertebral artery, carotid siphons an
d M1   segment of the left MCA.      Impression:      No acute intracranial abno
rmalities.      Chronic findings:   1.  Mild generalized volume loss.   2.  Diff
use supratentorial white matter small vessel ischemic changes.      Signed by: MARY GRACE Joes M.D. on 2020 8:27 PM        Dictated By: STANTON KENDRICK MD, MD  Electronically Signed By: STANTON JOSE MD, MD on 2037  Transcribed By: ZELDA on 20       COPY TO:   KHUSHISENTHIL MARY GRACE LOTT         BNP [id number] 19:32:00* 



             Test Item    Value        Reference Range Interpretation Comments

 

             B-Type Natriuretic Peptide (test code = 77517-7) 61.9         0-100

                      





[facility] ethanol measurement 
(mass/volume)2020 19:32:00* 



             Test Item    Value        Reference Range Interpretation Comments

 

             Ethyl Alcohol Level (test code = 5643-2) < 10.0       0.0-10.0     

              





[facility] CenterUrine color [id number] 
19:30:00* 



             Test Item    Value        Reference Range Interpretation Comments

 

             Urine Color (test code = 5778-6) YELLOW       YELLOW               

      





[facility] CenterUrine [id number] 19:30:00* 



             Test Item    Value        Reference Range Interpretation Comments

 

             Urine Clarity (test code = 05189-5) SL CLOUDY    CLEAR             

         





[facility] of Urine by Test strip
2020 19:30:00* 



             Test Item    Value        Reference Range Interpretation Comments

 

             Urine Specific Gravity (test code = 5811-5) 1.025        1.010-1.02

5                





[facility] CenterU[facility] pH measurement by automated test 
[id number] 19:30:00* 



             Test Item    Value        Reference Range Interpretation Comments

 

             Urine pH (test code = 36378-1) 7            5-7                    

    





[facility] [facility] ester[facility] by 
xlvgbnst2745-69-54 19:30:00* 



             Test Item    Value        Reference Range Interpretation Comments

 

             Urine Leukocyte Esterase (test code = 5799-2) NEGATIVE     NEGATIVE

                   





[facility] nitrite ojtwzsdru9412-26-71 
19:30:00* 



             Test Item    Value        Reference Range Interpretation Comments

 

             Urine Nitrite (test code = 28103-1) NEGATIVE     NEGATIVE          

         





[facility] protein measurement by test strip 
(mass/volume)2020 19:30:00* 



             Test Item    Value        Reference Range Interpretation Comments

 

             Urine Protein (test code = 5804-0) >=300        NEGATIVE           

        





[facility] glucose axzusicnx8714-72-30 
19:30:00* 



             Test Item    Value        Reference Range Interpretation Comments

 

             Urine Glucose (UA) (test code = 2349-9) NEGATIVE     NEGATIVE      

             





[facility] Center[facility] ketones detection by automated test
 uynvk5816-73-41 19:30:00* 



             Test Item    Value        Reference Range Interpretation Comments

 

             Urine Ketones (test code = 38842-0) NEGATIVE     NEGATIVE          

         





[facility] Center[facility] urobilinogen measurement by test 
strip (mass/volume)2020 19:30:00* 



             Test Item    Value        Reference Range Interpretation Comments

 

             Urine Urobilinogen (test code = 68607-2) 0.2          0.2-1        

              





[facility] total bilirubin measurement 
(mass/volume)2020 19:30:00* 



             Test Item    Value        Reference Range Interpretation Comments

 

             Urine Bilirubin (test code = 1978-6) NEGATIVE     NEGATIVE         

          





[facility] Center[facility] erythrocytes lafkzrrrq4068-72-63 
19:30:00* 



             Test Item    Value        Reference Range Interpretation Comments

 

             Urine Blood (test code = 57537-0) NEGATIVE     NEGATIVE            

       





[facility] CenterAutomated urine sediment leukocyte count 
by microscopy (number/high power field)2020 19:30:00* 



             Test Item    Value        Reference Range Interpretation Comments

 

             Urine WBC (test code = 5821-4) 0-5          0-5                    

    





[facility] CenterErythrocytes detection in urine sediment 
by light iwzjualjgc6967-26-75 19:30:00* 



             Test Item    Value        Reference Range Interpretation Comments

 

             Urine RBC (test code = 80991-9) NONE         0-5                   

     





[facility] CenterBacteria detection in urine sediment by 
light acqkscdofv0162-41-63 19:30:00* 



             Test Item    Value        Reference Range Interpretation Comments

 

             Urine Bacteria (test code = 63338-0) FEW          NONE             

          





[facility] CenterEpithelial cells detection in urine 
sediment by light bfjpoxavvv8839-69-26 19:30:00* 



             Test Item    Value        Reference Range Interpretation Comments

 

             Urine Epithelial Cells (test code = 59908-3) NONE         NONE     

                  





[facility] CenterOsmolality of Smhbi8026-41-33 19:30:00* 



             Test Item    Value        Reference Range Interpretation Comments

 

             Urine Osmolality (test code = 2695-5 415          .               

           





                                  24 hr :   300 -  900                          
        Random:    50 - 1400                                  After 12hr fluid  
                                restriction:    >850Performed at:  [facility] 
[id number] [place], [place]  [id number] Director: Wolf Aragon MD, 
Phone:  [phone] [facility]- CT HEAD/BRAIN W/O 
CONT2020-02-10 13:07:00  Name: AMARILYS AMADOR               [facility] 
                    : 1945 Age/S: 74  / M         [address] [facility]     
           Unit #: [id number]     Loc:               [place]  [address]       
       Phys: Jennifer Quiles MD                                              
 Acct: [id number]  Dis Date:               Status: REG ER                     
             PHONE #: [phone]     Exam Date: 02/10/2020  1255              
       FAX #: [phone]      Reason: FRONTAL HEADACHE                        
            EXAMS:                                               CPT CODE:      
[id number] CT HEAD/BRAIN W/O CONT                     [address]                    
HISTORY: Frontal headaches.               COMPARISON: Head CT from 2019.               CT brain without contrast: Automated exposure control.      
         Location: [facility].               No acute intracranial bleeds or extra-
axial collections are noted. No       acute territorial vascular infarction is 
noted. 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               
The sulci, gyri, ventricles and subarachnoid spaces and the basilar       
cisterns are normal for patient's age. No herniation or hydrocephalus       or 
midline shift is noted. Ectatic basilar artery noted again.               
Extensive chronic periventricular ischemic gliosis is noted.       Age-
appropriate atrophy is noted as well.               Portions of the visualized 
paranasal sinuses are normal.               No obvious bony calvarial defect is 
noted.                 IMPRESSION:                   No acute intracranial 
bleeds or extra-axial collections.                   No acute territorial 
vascular infarction.                   No herniation or hydrocephalus or midline
 shift.                    Extensive chronic white matter ischemic disease and 
atrophy .                              ** Electronically Signed by FORREST Hernadez on 02/10/2020 at 1307 **                      Reported and signed by: 
Isaias Hernadez M.D.     CC: Jennifer Quiles MD                                
                                                                                
   Technologist:Driss Zamarripa RT(R),(MR),(CT)   CTDI:        DLP:        Trnscb 
Date/Time: 02/10/2020 (2035) t.SDR.TH4                        Orig Print D/T: S:
 02/10/2020 (8612)      PAGE  1                       Signed Report             
                  [id number] 07:12:00* 



             Test Item    Value        Reference Range Interpretation Comments

 

             GLUBED (test code = GLUBED) 91 mg/dL            N            

Performed by certified  at

 [facility]





NNRGSQHA--81-07 21:27:00* 



             Test Item    Value        Reference Range Interpretation Comments

 

             TROPONIN-I (test code = TROPI) <0.015 ng/mL 0-0.045      N         

    





COMMENTS TO PHLEBOTOMIST: COLLECT 3 HOURS AFTER PREVIOUS                        
   [id number] 20:57:00* 



             Test Item    Value        Reference Range Interpretation Comments

 

             GLUBED (test code = GLUBED) 93 mg/dL            N            

Performed by certified  at

 [facility]





- MRI BRAIN W/O [id number] 20:16:00 FAX:         Mandi Conway   
[phone]    [place]: B   St: ADM FAX: Darby Stephens 
[phone]   ----------------------------------------
---------------------------------------  Name:   AMARILYS AMADOR            
  [facility]                     : 1945  Age/S: 74/M           [address]
 [facility]                Unit #: [id number]      [place]: [place]  [address]              Phys: Mandi Conway NP                            
                  Acct: [id number] Dis Date:               Status: ADM IN     
                            PHONE #: [phone]     Exam Date:     2019                   FAX #: [phone]     Reason: TIA                  
                              EXAMS:                                            
   CPT CODE:      [id number] MRI BRAIN W/O CONTRAST                     [address]    
                REASON FOR EXAM: TIA               Exam Order Date: 2019 12
:38 PM               Ordering: Mandi Conway NP       Attending:Darby Villa MD       Location:               Procedure:  - MRI BRAIN W/O CO
NTRAST               Comparison:               FINDINGS: Axial, sagittal, and co
tanvir images of the head were       obtained using T1, T2 weighted, inversion re
covery, and gradient echo       sequences.  The diffusion images are within norm
al limits without       abnormal signal intensity to suggest acute infarct. No I
V gadolinium       was given.               The sagittal images show normal pitu
itary, cerebellum, and brain stem.       No evidence of suprasellar mass.       
        The axial T2, inversion recovery, and gradient echo images show no      
 evidence of intra or extra axial mass. The ventricles, cisterns, and       sulc
i are unremarkable. No evidence of hemorrhage.  Mild       periventricular deep 
white matter disease.               The cerebellar pontine angle area is within 
normal limits. There is no       evidence of mass noted.               The axial
 T1 images show no evidence of mass.               The coronal images show todd
l optic chiasm.                 IMPRESSION: Chronic bilateral basal ganglia infa
rcts.  Nonspecific         deep white matter disease.  No acute findings        
  ** Electronically Signed by FORREST Santos on 2019 at 2016 **             
         Reported and signed by: Joshua Santos M.D.          PAGE  1                 
      Signed Report                    (CONTINUED)  FAX:         Aneta Conway   [phone]    [place]: B   St: [facility] FAX: Darby Stephens [phone]
[phone]   ---------------------------------------------------------------------
----------  Name:   AMARILYS AMADOR              [facility]             
        : 1945  Age/S: 74/M           [address]                U
nit #: [id number]      Loc: V.       [place]  [address]              Phys:
 Manid Conway NP                                              Acct: 
[id number] Dis Date:               Status: ADM IN                                 P
LATRICIA #: [id number]     Exam Date:     2019                   FA
X #: [phone]     Reason: TIA                                               
 EXAMS:                                               CPT CODE:      [id number] M
RI BRAIN W/O CONTRAST                     [address]               <Continued>       
                                CC: Mandi Conway  NP; Darby Villa MD                                                                     
                Technologist: Nazario SCHULTE)(MR)                        
     Trnscrd Date/Time/By: 2019 () : By: Kendra.VTL           Orig P
rint D/T: S: 2019 ()                         PAGE  2                  
     Signed Report                               [id number] 18:36:00* 



             Test Item    Value        Reference Range Interpretation Comments

 

             TROPONIN-I (test code = TROPI) <0.015 ng/mL 0-0.045      N         

    





COMMENTS TO PHLEBOTOMIST: COLLECT 3 HOURS AFTER PREVIOUS                        
   [id number] 16:55:00* 



             Test Item    Value        Reference Range Interpretation Comments

 

             GLUBED (test code = GLUBED) 115 mg/dL           H            

Performed by certified  

at [facility]





- CTA [id number] 15:33:00  Name: AMARILYS AMADOR               [facility]                     : 1945 Age/S: 74  / M         [address] 
[facility]                Unit #: [id number]     Loc:               [place]  [address]              Phys: Mandi Conway NP                             
                 Acct: [id number]  Dis Date:               Status: ADM IN     
                             PHONE #: [phone]     Exam Date: 2019  
1303                     FAX #: [phone]      Reason: TIA                   
                              EXAMS:                                            
   CPT CODE:      [id number] CTA NECK                                   [address]    
                HISTORY: TIA               TECHNIQUE: Cerebral and Cervical CT 
angiography was acquired in the       axial plane after bolus IV administration 
of iodinated contrast.       Multiplanar, maximum intensity projection (MIP), 
and volume rendered       reconstructions were created on the 3-D workstation. 
Automated       exposure control for dose reduction.               COMPARISON: 
Noncontrast CT brain earlier today               FINDINGS:               There 
is atherosclerotic disease in the bilateral carotid bulbs but no       
significant stenosis. Atherosclerotic vascular calcification and       luminal 
irregularity of the bilateral cavernous ICA segments.       Bilateral petrous 
and supraclinoid ICA segments are patent. Normal       enhancement of the 
bilateral ophthalmic arteries. Bilateral A1 and       distal TOAN segments are 
patent. Anterior communicating artery is       present. Bilateral M1 and distal 
MCA branches are patent. No aneurysm.               Bilateral distal vertebral 
arteries are patent although the left       vertebral artery is hypoplastic 
compared to the right (likely       representing normal anatomic variant). 
Basilar artery is patent.       Bilateral PICAs and SCAs are patent. Normal a
ppearance of the basilar       tip. Bilateral P1 and distal PCA segments are pat
ent. Right posterior       commuting artery is not visualized and may be aplasti
c. Left posterior       commuting artery is present. No aneurysm.               
Dural venous sinuses and proximal internal jugular veins are patent.            
    Visualized brain parenchyma is unremarkable. No mass-effect or midline      
 shift. No hydrocephalus. Visualized paranasal sinuses and mastoid air       debora
ls are clear. Regional osseous structures structures are intact.                
         IMPRESSION:                   Atherosclerotic disease is present in the
 bilateral carotid bulbs and         the bilateral carotid siphons but there is 
no significant stenosis or         occlusion.         The right posterior commut
ing artery is not visualized and may be         aplastic. Remainder of the intra
cranial vessels are within normal         limits.                   Location: 
A     PAGE  1                       Signed Report                    (CONTINUED)
   Name: AMARILYS AMADOR               [facility]                     YOB: 1945 Age/S: 74  / M         [address] [facility]                Unit #: [id number]
[id number]     Loc:               [place]  [address]              Phys: JUSTIN Conway NP                                              Acct: [id number]  D
is Date:               Status: ADM IN                                  PHONE #: 
[phone]     Exam Date: 2019  1303                     FAX #: [phone]
[phone]      Reason: TIA                                                 EXAMS:    
                                           CPT CODE:      [id number] CTA NECK    
                               [address]               <Continued>                  
   ** Electronically Signed by Aleksander Pham MD on 2019 at 1533 **          
            Reported and signed by: Aleksander Pham MD                              
       CC: Mandi Conway NP; Darby Villa MD                  
                                                                   
Technologist:Driss Zamarripa RT(R),(MR),(CT)   CTDI:        DLP:        Trnscb 
Date/Time: 2019 (1533) t.SDR.RR31/t.SDR.RR31            Orig Print D/T: S:
 2019 (1536)      PAGE  2                       Signed Report             
                  - CTA [id number] 15:33:00  Name: AMARILYS AMADOREL      
         [facility]                     : 1945 Age/S: 74  / M      
   [address]                Unit #: [id number]     Loc:               
[place]  [address]              Phys: Mandi Conway  NP                  
                            Acct: [id number]  Dis Date:               Status: 
ADM IN                                  PHONE #: [phone]     Exam Date: 
2019  1303                     FAX #: [phone]      Reason: TIA       
                                          EXAMS:                                
               CPT CODE:      [id number] CTA HEAD                                
   [address]                    HISTORY: TIA               TECHNIQUE: Cerebral and 
Cervical CT angiography was acquired in the       axial plane after bolus IV 
administration of iodinated contrast.       Multiplanar, maximum intensity 
projection (MIP), and volume rendered       reconstructions were created on the 
3-D workstation. Automated       exposure control for dose reduction.           
    COMPARISON: Noncontrast CT brain earlier today               FINDINGS:      
         There is atherosclerotic disease in the bilateral carotid bulbs but no 
      significant stenosis. Atherosclerotic vascular calcification and       
luminal irregularity of the bilateral cavernous ICA segments.       Bilateral 
petrous and supraclinoid ICA segments are patent. Normal       enhancement of 
the bilateral ophthalmic arteries. Bilateral A1 and       distal TOAN segments 
are patent. Anterior communicating artery is       present. Bilateral M1 and 
distal MCA branches are patent. No aneurysm.               Bilateral distal 
vertebral arteries are patent although the left       vertebral artery is hypo
plastic compared to the right (likely       representing normal anatomic variant
). Basilar artery is patent.       Bilateral PICAs and SCAs are patent. Normal a
ppearance of the basilar       tip. Bilateral P1 and distal PCA segments are pat
ent. Right posterior       commuting artery is not visualized and may be aplasti
c. Left posterior       commuting artery is present. No aneurysm.               
Dural venous sinuses and proximal internal jugular veins are patent.            
    Visualized brain parenchyma is unremarkable. No mass-effect or midline      
 shift. No hydrocephalus. Visualized paranasal sinuses and mastoid air       debora
ls are clear. Regional osseous structures structures are intact.                
         IMPRESSION:                   Atherosclerotic disease is present in the
 bilateral carotid bulbs and         the bilateral carotid siphons but there is 
no significant stenosis or         occlusion.         The right posterior commut
ing artery is not visualized and may be         aplastic. Remainder of the intra
cranial vessels are within normal         limits.                   Location: 
A     PAGE  1                       Signed Report                    (CONTINUED)
   Name: AMARILYS AMADOR               [facility]
B: 1945 Age/S: 74  / M         [address] [facility]                Unit #: [id number]
[id number]     Loc:               [place]  [address]              Phys: JUSTIN Conway NP                                              Acct: [id number]  D
is Date:               Status: ADM IN                                  PHONE #: 
[phone]     Exam Date: 2019  1303                     FAX #: [phone]
[phone]      Reason: TIA                                                 EXAMS:    
                                           CPT CODE:      [id number] CTA HEAD    
                               [address]               <Continued>                  
   ** Electronically Signed by Aleksander Pham MD on 2019 at 1533 **          
            Reported and signed by: Aleksander Pham MD                              
       CC: Mandi Conway  NP; Darby Villa MD                  
                                                                   
Technologist:Driss Zamarripa RT(R),(MR),(CT)   CTDI:        DLP:        Trnscb 
Date/Time: 2019 (8692) t.SDR.RR31/t.SDR.RR31            Orig Print D/T: S:
 2019 (6578)      PAGE  2                       Signed Report             
                  LIPID PROFILE (CORONARY RISK)2019 14:18:00* 



             Test Item    Value        Reference Range Interpretation Comments

 

             TRIGLYCERIDES (test code = TRIG) 146 mg/dL           N       

      

 

             CHOLESTEROL (test code = CHOL) 177 mg/dL    0-200        N         

    

 

             CHOLESTEROL/HDL RATIO (test code = CHOLHDL) 2.0 RATIO    0-4.9     

   N            RISK ASSOCIATED 

WITH CHOL/HDL RATIOS:     Risk          Male       Female1/2 AVERAGE        3.43
        3.27AVERAGE            4.97        4.442X AVERAGE         9.55        
7.053X AVERAGE         23.39      11.04 REFERENCE VALUE IS RELATED TO RISK 
LEVELS ASRECOMMENDED BY THE THI. HEART, LUNG, AND BLOOD INST.

 

             HDL CHOLESTEROL (test code = HDL) 60 mg/dL     40-60        N      

       

 

             LIPOPROTEIN LDL (test code = LDL) 102 mg/dL    100-129      N      

      RN PERSONNEL, CONTACT 

PHYSICIAN IMMEDIATELY IF THIS IS A STROKE, AMI OR CAROTID STENOSIS PATIENT WHEN 
THE LDL >100 (1ST OCCURENCE, THIS 
ADMISSION)===========================================================Reference 
Interval:          mg/dL          
mmol/L-----------------------------------------------------------Optimal        
              <100           <2.6Near/above optimal          100-129        2.6-
3.3Borderline High             130-159        3.4-4.1High                       
 160-189        4.1-4.9Very High                    >=190          
>=4.9========= This LDL result is a direct measurement.=========





- XR CHEST 1 -67-26 12:08:00 FAX:         Cole Cherry MD     [phone]
    [place]: B   St: ADM----------
---------------------------------------------------------------------  Name:   AMARILYS TUCKER              [facility]                     : [birth year]  Age/S: 74/M           [address] [facility]                Unit #: [id number]    
  Loc: [place]  [address]              Phys: Cole Cherry MD    
                                                Acct: [id number] Dis Date:    
           Status: ADM IN                                 PHONE #: [phone] 
    Exam Date:     2019     1115                   FAX #: [phone]    
 Reason: CODE STROKE                                        EXAMS:              
                                 CPT CODE:      [id number] XR CHEST 1 V          
                     [address]                            REASON FOR EXAM: CODE STRO
KE               Exam Order Date: 2019 11:03 AM               Ordering M.D.
: Cole Cherry MD               PROCEDURE:  - XR CHEST 1 V               COMPAR
DANNY: 2 view chest x-ray 2014               FINDINGS:         There 
are patchy opacities in the mid to lower lungs bilaterally and       the left co
stophrenic recess is not clearly visualized. Upper lungs       are clear.       
        Cardiomediastinal silhouette is normal in size for technique. The       
mediastinal contours are within normal limits.               No acute musculoske
letal abnormality.               The visualized upper abdomen is within normal l
imits.                         IMPRESSION:         Bibasilar opacities may repre
sent any combination of atelectasis,         aspiration, and pneumonia. Addition
ally small left sided pleural         effusion cannot be excluded. The upper azar
gs are clear.                   Location: [facility]          ** Electronically Signed 
by Aleksander Pham MD on 2019 at 1208 **                      Reported and sig
balwinder by: Aleksander Pham MD          CC: Cole Cherry MD                            
                                                                                
            Technologist: Breanne FLOOD(R)                                  
 Trnscrd Date/Time/By: 2019 (1208) : By: LowRR31          Orig Print D/
T: S: 2019 (3763)                         PAGE  1                       Si
gned Report                               BASIC METABOLIC [id number] 
11:41:00* 



             Test Item    Value        Reference Range Interpretation Comments

 

             SODIUM (test code = NA) 139 mmol/L   136-145      N             

 

             POTASSIUM (test code = K) 3.8 mmol/L   3.5-5.1      N             

 

             CHLORIDE (test code = CL) 102.0 mmol/L        N             

 

             CARBON DIOXIDE (test code = CO2) 30.0 mmol/L  21-32        N       

      

 

             ANION GAP (test code = GAP) 10.8         10-20        N            

 

 

             GLUCOSE (test code = GLU) 145 mg/dL           H             

 

             BLOOD UREA NITROGEN (test code = BUN) 14 mg/dL     7-18         N  

           

 

             GLOMERULAR FILTRATION RATE (test code = GFR) 59 mL/min    >=60     

                 Estimated GFR by 

using Modified MDRD formula.Chronic kidney disease is defined as either kidney 
damageor GFR <60 mL/min/1.73 m2 for >3 months.

 

             CREATININE (test code = CREAT) 1.20 mg/dL   0.7-1.3      N         

    

 

             BUN/CREATININE RATIO (test code = BUN/CREA) 11.9         10-20     

   N             

 

             CALCIUM (test code = CA) 9.2 mg/dL    8.5-10.1     N             





[id number] 11:41:00* 



             Test Item    Value        Reference Range Interpretation Comments

 

             TROPONIN-I (test code = TROPI) <0.015 ng/mL 0-0.045      N         

    





BASIC METABOLIC [id number] 11:33:00* 



             Test Item    Value        Reference Range Interpretation Comments

 

             SODIUM (test code = NA) 139 mmol/L   136-145      N             

 

             POTASSIUM (test code = K) 3.8 mmol/L   3.5-5.1      N             

 

             CHLORIDE (test code = CL) 102.0 mmol/L        N             

 

             CARBON DIOXIDE (test code = CO2)  mmol/L      21-32                

      

 

             ANION GAP (test code = GAP)              10-20                     

 

 

             GLUCOSE (test code = GLU)  mg/dL                            

 

             BLOOD UREA NITROGEN (test code = BUN)  mg/dL       7-18            

           

 

             GLOMERULAR FILTRATION RATE (test code = GFR)  mL/min      >=60     

                  

 

             CREATININE (test code = CREAT)  mg/dL       0.7-1.3                

    

 

             BUN/CREATININE RATIO (test code = BUN/CREA)              10-20     

                 

 

             CALCIUM (test code = CA)  mg/dL       8.5-10.1                   





[id number] 11:33:00* 



             Test Item    Value        Reference Range Interpretation Comments

 

             TROPONIN-I (test code = TROPI)  ng/mL       0-0.045                

    





CBC W/AUTO [id number] 11:28:00* 



             Test Item    Value        Reference Range Interpretation Comments

 

             WHITE BLOOD CELL (test code = WBC) 6.1 K/mm3    4.5-12.5     N     

        

 

             RED BLOOD CELL (test code = RBC) 3.87 mill/mm3 4.0-5.8      L      

       

 

             HEMOGLOBIN (test code = HGB) 12.1 gram/dL 13.0-17.5    L           

  

 

             HEMATOCRIT (test code = HCT) 37.3 %       42.0-52.0    L           

  

 

             MEAN CELL VOLUME (test code = MCV) 96.4 fL      80-98        N     

        

 

             MEAN CELL HGB (test code = MCH) 31.3 picogram 27.0-33.0    N       

      

 

             MEAN CELL HGB CONCETRATION (test code = MCHC) 32.4 gram/dL 33.0-36.

0    L             

 

             RED CELL DISTRIBUTION WIDTH (test code = RDW) 12.9 %       11.6-16.

2    N             

 

             RED CELL DISTRIBUTION WIDTH SD (test code = RDW-SD) 45.7 fL      37

.0-51.0    N             

 

             PLATELET COUNT (test code = PLT) 230 K/mm3    150-450      N       

      

 

             MEAN PLATELET VOLUME (test code = MPV) 9.8 fL       6.7-11.0     N 

            

 

             NEUTROPHIL % (test code = NT%) 61.9 %       39.0-69.0    N         

    

 

             IMMATURE GRANULOCYTE % (test code = IG%) 0.5 %        0.0-5.0      

N             

 

             LYMPHOCYTE % (test code = LY%) 25.1 %       25.0-55.0    N         

    

 

             MONOCYTE % (test code = MO%) 7.8 %        0.0-10.0     N           

  

 

             EOSINOPHIL % (test code = EO%) 3.0 %        0.0-5.0      N         

    

 

             BASOPHIL % (test code = BA%) 1.7 %        0.0-1.0      H           

  

 

             NUCLEATED RBC % (test code = NRBC%) 0.0 %        0-0          N    

         

 

             NEUTROPHIL # (test code = NT#) 3.76 K/mm3   1.8-7.7      N         

    

 

             IMMATURE GRANULOCYTE # (test code = IG#) 0.03 x10 3/uL 0-0.03      

 N             

 

             LYMPHOCYTE # (test code = LY#) 1.52 K/mm3   1.0-5.0      N         

    

 

             MONOCYTE # (test code = MO#) 0.47 K/mm3   0-0.8        N           

  

 

             EOSINOPHIL # (test code = EO#) 0.18 K/mm3   0.0-0.5      N         

    

 

             BASOPHIL # (test code = BA#) 0.10 K/mm3   0.0-0.2      N           

  

 

             NUCLEATED RBC # (test code = NRBC#) 0.00 K/mm3   0.0-0.1      N    

         

 

             MANUAL DIFF REQUIRED (test code = MDIFF) NO                        

              





PROTHROMBIN [id number] 11:26:00* 



             Test Item    Value        Reference Range Interpretation Comments

 

             PROTHROMBIN TIME PATIENT (test code = PTP) 11.2 seconds 9.0-14.0   

  N             

 

             INTERNATIONAL NORMAL RATIO (test code = INR) 1.0          0.8-1.2  

    N            The therapeutic range

 for oral anticoagulant therapy formost indications is an international 
normalized ratio (INR)of between 2.0 and 3.0.  The recommended therapeutic 
INRrange for various clinical situations is listed 
below:_________________________________________________________Clinical 
Situation                          INR 
range_________________________________________________________ Pulmonary e
mbolism treatment              (2.0-3.0)Venous thrombosis treatmentVenous 
thrombosis prophylaxis (high risk surgery)Prevention of systemic embolism from: 
        Acute myocardial infarction         Valvular heart disease         
Atrial fibrillation Mechanical prosthetic heart valves          (2.5-3.5)





IS PATIENT ON ANTICOAGULANTS? NTHROMBOPLASTIN TIME [id number] 11:26:00* 



             Test Item    Value        Reference Range Interpretation Comments

 

             THROMBOPLASTIN TIME PARTIAL (test code = PTT) 29.0 seconds 25.0-36.

5    N             





IS PATIENT ON ANTICOAGULANTS? N- CT HEAD/BRAIN W/O [id number] 11:16:00  
Name: AMARILYS AMADOR               [facility]                     : 
1945 Age/S: 74  / M         [address] [facility]                Unit #: [id number]
[id number]     Loc:               [place]  [address]              Phys: Bola Cherry MD                                                    Acct: [id number]  Di
s Date:               Status: PRE ER                                  PHONE #: [phone]
[phone]     Exam Date: 2019  1103                     FAX #: [phone]
[phone]      Reason: R sided weaness and numbness                        EXAMS:     
                                          CPT CODE:      [id number] CT HEAD/BRAIN
 W/O CONT                     82386                    HISTORY:  R sided weaness
 and numbness               TECHNIQUE: Noncontrast 2.5 mm axial CT of the head. 
Examination       acquired within 24 hours of arrival. Automated exposure contro
l for       dose reduction.               COMPARISON: Noncontrast CT brain 2015               FINDINGS:               No lacerations or contusions o
f the scalp or facial soft tissues.        Calvarium and skull base are intact. 
              No acute hemorrhage. No intracranial mass, mass effect, or midline
       shift. No effacement of the sulci or grey-white matter interface.        
        Cortical atrophy with moderate ventriculomegaly and microvascular       
ischemic changes of the white matter appear to have progressed since       the p
rior exam. There are also small hypodense lesions in the       bilateral thalami
 which may represent lacunar infarcts.               Visualized paranasal sinuse
s are clear.        Mastoid air cells and middle ear cavities are clear.        
Prior lens extraction bilaterally.                          IMPRESSION:         
          No acute intracranial hemorrhage or mass effect or large vascular     
    territorial infarct.         Diffuse cortical atrophy with microvascular isc
hemic changes appear to         have progressed since the prior exam in .   
      Small hypodense lesions in the bilateral thalami may represent lacunar    
     infarcts.                   Location: [facility]          ** Electronically Signed
 by Aleksander Pham MD on 2019 at 1116 **                      Reported and si
gned by: Aleksander Pham MD          PAGE  1                       Signed Report    
                (CONTINUED)   Name: AMARILYS AMADOR               [facility]                     : 1945 Age/S: 74  / M         [address] [facility]
                Unit #: [id number]     Loc:               [place] [address]  
            Phys: Cole Cherry MD                                              
      Acct: [id number]  Dis Date:               Status: PRE ER                
                  PHONE #: [phone]     Exam Date: 2019  1103         
            FAX #: [phone]      Reason: R sided weaness and numbness       
                 EXAMS:                                               CPT CODE: 
     [id number] CT HEAD/BRAIN W/O CONT                     53791               <
Continued>                                       CC: Cole Cherry MD           
                                                                                
                             Technologist:Kym Colorado RT(R),CT    CTDI: 
       DLP:        Trnscb Date/Time: 2019 (1116) t.SDR.RR31               
        Orig Print D/T: S: 2019 (1119)      PAGE  2                       
Signed Report                               CT MAXIO FAC/PARANAS [id number] 
14:40:00                                                                        
              [place] [address]      Patient Name: AMARILYS AMADOR                                   MR #: [id number]                     : 
1945                                   Age/Sex: 73/M  Acct #: [id number]
                              Req #: [id number]  Adm Physician:                 
                                     Ordered by: LEONOR PADILLA NP               
             Report #: [id number]        Location: CT                            
          Room/Bed:                     
_________________________________________________
__________________________________________________    Procedure: [id number] CT/CT
 MAXIO FAC/PARANAS WO  Exam Date: 19                            Exam Time:
 0900                                              REPORT STATUS: Signed    Hist
ory: Maxillary sinusitis   Comparison studies: None      Technique:    Axial nitin
ges were obtained through the paranasal sinuses.   Coronal and sagittal images r
econstructed from the axial data.   Radiation dose: Total DLP: 295 mGy*cm. Estim
ated effective dose: DLP x 0.015    Intravenous contrast: None      Findings:   
   Right anterior complex:   Frontal sinus: Clear aside from mild mucosal thicke
yu in the anteroinferior   sinus.   Frontonasal recess: Patent.   Anterior eth
moid air cells: Clear.   Ostiomeatal unit: Maxillary ostium is narrowed by mucos
al thickening but is   patent. The ethmoid infundibulum and hiatus semilunaris a
re likewise patent.   Maxillary sinus: Partially opacified with peripheral mucos
al thickening,   greatest along the alveolar recess.      Left anterior complex:
   Frontal sinus: Clear.   Frontonasal recess: Patent.   Anterior ethmoid air ce
lls: Mild mucosal thickening otherwise clear.   Ostiomeatal unit: Maxillary osti
um narrowed by mucosal thickening but patent.   The ethmoid infundibulum and hia
tus semilunaris are likely patent.   Maxillary sinus: Peripheral mucosal thicken
ing predominantly along the alveolar   recess.      Posterior complex:   Left sp
henoid sinus: Clear.   Right sphenoid sinus: Contains small nonspecific frothy s
ecretions or debris.    Clear. Sphenoethmoidal recesses: Clear.   Posterior ethm
oid air cells: Clear.         Other:    Nasal vestibule and cavity: Patent   Selvin
al septum: Deviated to the patient's right.   Agger Nasi: Clear bilaterally.   T
urbinates:  Non-aerated bilaterally.   Manny cells: None    Lamina papyracea: I
ntact.   Cribriform plates: Approximate 3 to 4 mm on the right and 4 to 5 mm on 
the left   below the level of the fovea ethmoidalis.   Olfactory recesses: Clear
   Optic canals: Not dehiscent   Onodi cells: None   Internal carotid canals: No
t dehiscent. Both carotid canal slightly bulge into   the left sphenoid sinus wi
th air covered by thin bony plates.   Sphenoid sinuses: Asymmetric with dominant
 left sinus. The lateral recesses are   not aerated. The septum inserts to the r
ight of midline and inserts along the   anterior wall the right cavernous caroti
d canal.      Orbits: No abnormalities.   Bones: No fractures or lytic or blasti
c lesions.   Temporal bones: No gross abnormalities.      Dentition: Edentulous 
maxilla. Multiple absent mandibular teeth with mandibular   dental caries      I
ncluded brain: Mild generalized cerebral volume loss with nonspecific   perivent
ricular hypodensities which may reflect chronic microvascular ischemic   changes
. Scattered calcified atherosclerosis in the carotid siphons, and   dominant rig
ht intradural vertebral artery and in the basilar artery with mild   vertebrobas
ilar dolichoectasia.      Incidental findings:    Lens replacements for previous
 cataract surgery.   Calcified atherosclerosis in the proximal left cervical int
ernal carotid   artery.      IMPRESSION:      1.  Nonspecific inflammatory mucos
al thickening in the maxillary sinuses, right   inferior frontal sinus and left 
anterior ethmoids with small nonspecific debris   or secretions in the right sph
enoid sinus.   2.  Sinus drainage pathways are patent.   3.  Rightward nasal sep
nelly deviation.      Signed by: Dr. Raymond Jacinto M.D. on 2019 3:02 PM      
  Dictated By: RAYMOND JACINTO MD  Electronically Signed By: RAYMOND JACINTO MD 
on 19 1502  Transcribed By: ZELDA on 19 1502       COPY TO:   LEONOR CANSECO NP

## 2020-10-17 NOTE — DIAGNOSTIC IMAGING REPORT
EXAMINATION: Head CT without contrast.  





HISTORY:Altered mental status.



COMPARISON:CT brain from 9/25/2020.



TECHNIQUE: Multidetector axial images were obtained from the foramen magnum to

the vertex without contrast. The images were reconstructed using brain and bone

algorithms.  Thin section brain images were reformatted into coronal and

sagittal planes.

Dose modulation, iterative reconstruction, and/or weight based adjustment of

the mA/kV was utilized to reduce the radiation dose to as low as reasonably

achievable.



Intravenous contrast: None  



IMAGE QUALITY: Acceptable.



FINDINGS:

    Skull/scalp: No lytic or blastic. lesions.  No surgical changes.

    Parenchyma: Unchanged nonspecific bilateral frontoparietal confluent

periventricular, patchy and confluent subcortical and deep white matter

hypodensities are likely related to small vessel ischemic changes.

Chronic lacunar infarct in the left thalamocapsular region.

No acute hemorrhage, mass or acute major vascular territorial infarct.

    Arteries: No density suggestive of thrombosis.  Atherosclerotic

calcification in bilateral carotid siphon and vertebral basilar system which is

associated with dolichoectasia. 

    Dural sinuses: No abnormal density suggestive of thrombosis. 

    Ventricles: Moderate compensated dilatation due to volume loss. No acute

hydrocephalus.

    Extra-axial spaces: Unchanged prominent prepontine and premedullary cistern

may be secondary to underlying arachnoid cyst or due to ectatic course of

vertebrobasilar vessels.

    Brain volume: Normal for age.

    Craniocervical junction: No mass, Chiari malformation, or basilar

invagination.

    Sella: Partial empty sella. 

    Paranasal/mastoid sinuses: Imaged portions unremarkable.





IMPRESSION:

No acute intracranial abnormality.

No change since CT brain from 9/25/2020.



Chronic findings:

1. Diffuse supratentorial white matter microvascular ischemic changes.

2. Chronic lacunar infarct in left thalamocapsular region.

3. Mild generalized cerebral volume loss.



Signed by: Dr. Cecile Pineda M.D. on 10/17/2020 9:41 PM

## 2020-10-17 NOTE — XMS REPORT
Continuity of Care Document

                             Created on: 10/18/2020



Jacoby Seaman

External Reference #: [id number]

: 1945

Sex: Male



Demographics





                          Address                   [address] [place]  [address]

 

                          Home Phone                [phone]

 

                          Preferred Language        English

 

                          Marital Status            Unknown

 

                          Buddhist Affiliation     Unknown

 

                          Race                      Unknown

 

                          Ethnic Group              Unknown





Author





                          Author                    SugarCRMJacoby              [employer]

 

                          Address                   Unknown

 

                          Phone                     Unavailable







Care Team Providers





                    Care Team Member Name Role                Phone

 

                    [employer] Unavailable         Un

available



                                    



Problems

                    



                    Problem                         Status                      

   Onset Date       

                          Classification                         Date Reported  

         

                          Comments                         Source               

     

 

                          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              

           Active         

                    2020                                                  

     

                                                    [facility]                

    

 

                          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               

          Active          

                    10/03/2019                                                  

      

                                                     [facility] [place]            

        

 

                    UNK                         Active                         0

2013           

                                                                                

       

                                        [facility]                    

 

                    OTHER                         Active                        

 2012         

                                                                                

     

                                        [facility]                    

 

                    Hearing loss (finding)                         Active       

                    

                          Problem                         2020            

    

                          [facility]            

       

 

                          Hypertensive disorder, systemic arterial (disorder)   

                      

Active                                                   Problem                

 

                    2020                                                  

 [facility]                    



                                                                                
                                                                       



Medications

                    



                    Medication                         Details                  

       Route        

                          Status                         Patient Instructions   

          

                          Ordering Provider                         Order Date  

               

                                        Source                    

 

                          Naprosyn 500 mg oral tablet                         50

0 mg, 1 tab, PO, BID, 14 

tab, Substitution Allowed, TAB                         PO                       

                    Active                                                  Claude enriquez                 

                          2012                         [facility]       

             

 

                          acetaminophen-hydrocodone 500 mg-5 mg oral tablet     

                    1-2 

tab, PO, Q4-6H, PRN, 15 tab, Pain, Substitution Allowed, Maintenance            
                    PO                         Active                           

        

                    [facility]                   

      

[facility]                    

 

                          Toradol 30 mg/mL injectable solution                  

       60 mg, 2 mL, Route:

IM, Drug form: INJ, ONCE, Start date: 12 8:34:00, Stop date: 12 
8:34:00                         IM                         No Longer Active     

                                             [facility]                         

2012                              [facility]                    

 

                          acetaminophen-hydrocodone 325 mg-5 mg oral tablet     

                    1 tab,

Route: PO, Drug Form: TAB, ONCE, STAT, Start date: 12 8:34:00, Stop date: 
12 8:34:00                         PO                         No Longer 

Active                                                   [facility]                 

 

                          2012                         [facility]       

             



                                                                                
                                                   



Allergies, Adverse Reactions, Alerts

                    



                    Substance                         Category                  

       Reaction     

                          Severity                         Reaction type        

       

                    Status                         Date Reported                

         

Comments                                Source                    

 

                          No Known Medication Allergies                         

Assertion                 

                                                                      Drug aller

gy           

                                                                                

       

                                        [facility]                    



                                                        



Immunizations

        



                                        No Data Provided for This Section



                                     



Results





                                        No Data Provided for This Section



                    



Pathology Reports





                                        No Data Provided for This Section       

             



                                                



Diagnostic Reports

                    



                    Report                         Value                        

 Date               

                                        Source                    

 

                          Sinus wo contrast CT                         Radiation

 Dose CTDIVOL = 0 (mGy): 

DLP = 504 (mGy-cm)

PROCEDURE INFORMATION: 

Exam: CT Maxillofacial Without Contrast, Sinus 

Exam date and time: 2020 2:48 PM 

Age: 74 years old 

Clinical indication: /j32.4 sinus problems 

TECHNIQUE: 

Imaging protocol: CT Maxillofacial without contrast. Focus on the sinuses. 

Radiation optimization: All CT scans at this facility use at least one of these 

dose optimization techniques: automated exposure control; mA and/or kV 

adjustment per patient size (includes targeted exams where dose is matched to 

clinical indication); or iterative reconstruction. 

COMPARISON: 

No relevant prior studies available. 

RADIATION DOSE METRICS: 

Total DLP (mGy-cm): 504 

FINDINGS: 

Frontal sinuses:  There is minimal mucosal thickening right frontal sinus.

Ethmoid air cells: There is minimal mucosal thickening in the solitary right 

mid ethmoid air cell and within the right frontal sinus. 

Sphenoid sinuses: Normal. No air-fluid levels. 

Maxillary sinuses: There is minimal attenuation bilateral maxillary ostium. 

Orbits: Orbits are normal. Globes are unremarkable. 

Auditory system: There is normal pneumatization middle ear cavity, antrum and 

mastoid air cells. 

Nasopharynx: The nasopharynx is normal. 

Nasal cavity/Septum: The nasal septum is deviated to the right. There is 

prominence of the mucosa of the middle and inferior nasal turbinates without 

nasal polyposis. 

Soft tissues: Unremarkable. 

Bones/joints:  The study is is performed in a bone algorithm.

IMPRESSION:  There is minimal mucosal thickening in the right frontal sinus in 

the solitary right mid ethmoid air cell.

The nasal septum is deviated to the right. There is prominence of the mucosa of 

the middle and inferior nasal turbinates without nasal polyposis. 

Nikolas Alex MD On 2020  17:12:51; [id number]

                          2020                         [facility]       

 

           

 

                          Spine lumbar 2 or 3 views DX                         E

xam: Spine lumbar 2 or 3 

views DX

Reason for Exam:  - M47.816   Spondylosis without myelopathy or radiculopathy, 
lumbar region

Comparison Exam: None

Discussion:

                                        5 non rib-bearing lumbar vertebral vipul

s are seen.  Vertebral body heights are 

maintained. Grade 1 retrolisthesis seen of L2 on L3. Moderate degenerative 
changes seen at the L2/L3 and L5/S1 levels. No suspicious osteoblastic or 
osteolytic lesions. Large amount of atherosclerotic plaque seen within the 
abdominal aorta .

Note that a lumbar spine x-ray cannot rule out ligamentous injuries or spinal 
cord abnormalities. No dilated loops of bowel within the visualized portions of 
the abdomen and pelvis.



Impression:

                                        1.  Grade 1 retrolisthesis seen of L2 on

 L3. Moderate degenerative changes seen 

at the L2/L3 and L5/S1 levels.

                          10/03/2019                          [facility] [place]   

 

               



                                                                                
   



Consultation Notes

                    



                                        No Data Provided for This Section       

             



                                                            



Discharge Summaries

                    



                                        No Data Provided for This Section       

             



                                                            



History and Physicals

                    



                                        No Data Provided for This Section       

             



                                                                



Vital Signs

                     



                    Vital Sign                         Value                    

     Date           

                          Comments                         Source               

     

 

                    Heart Rate                         89                       

   2012       

                                                    [facility]                

    

 

                    Temperature Oral (F)                         98.3 F         

                

2012                                                   [facility]       

 

           

 

                    Respitory Rate                         20                   

       2012   

                                                    [facility]                

    

 

                    Diastolic (mm Hg)                         70                

          2012

                                                    [facility]                

   



 

                    Systolic (mm Hg)                         136                

          2012

                                                    [facility]                

   



 

                    Heart Rate                         88                       

   2012       

                                                    [facility]                

    

 

                    Diastolic (mm Hg)                         71                

          2012

                                                    [facility]                

   



 

                    Temperature Oral (F)                         98.1 F         

                

2012                                                   [facility]       

 

           

 

                    Respitory Rate                         18                   

       2012   

                                                    [facility]                

    

 

                    Systolic (mm Hg)                         138                

          2012

                                                    [facility]                

   



 

                    Weight                         81.818                       

   2012       

                                                    [facility]                

    

 

                    Height                         170.18 cm                    

     2012     

                                                    [facility]                

    



                                                                                
                                                                                
                                                                                
                     



Encounters

                    



                    Location                         Location Details           

              

Encounter Type                         Encounter Number                         

Reason For Visit                         Attending Provider                     

                    ADM Date                         DC Date                    

     Status      

                                        Source                    

 

                    [facility]                                                

  Emergency         

                    [id number]                                                

  

KACY NRIAGU                          2012                         Discharged                         [facility] [place]                    

                          

                          Outpt Diag Services                         [id number]

00                     

                                             Ervin Jorge                        

  10/03/2019     

                    10/04/2019                                                  

[facility]                   

                          

                    Outpatient                         [id number]             

                

                          Zander Abdi                          2020                                                  

[facility]                                                

  BAYLEE                

                    [id number]                         [facility]                    

     ADELAIDA HDZ                                                                           

 

                          Active                         [facility]           

         



                                                                                
                           



Procedures

                    



                    Procedure                         Code                      

   Date             

                    Perfomer                         Comments                   

      

Source                    

 

                    Cataract surgery                         [id number]          

                    

                                                                       [facility]                    



                                                            



Assessment and Plan

                    



                                        No Data Provided for This Section       

             



                                     



Plan of Care





                                        No Data Provided for This Section       

             



                                                                



Social History

                    



                    Social History                         Date                 

        Source      

             

 

                                        Social History TypeResponse

Alcohol

Past, Previous treatment: None.  Alcohol use interferes with work or home: No.  
Drinks more than intended: No.  Others hurt by drinking: No.  Ready to change: 
No.  Household alcohol concerns: No.

Substance Abuse

Previous Treatment: None.  IV drug use: No.  Drug use interferes with work/home:
No.  Ready to change: No.  Household substance abuse concerns: No.  Cessation 
Education Provided: No.

Smoking Status

Former smoker; Exposure to Tobacco Smoke None; Cigarette Smoking Last 365 Days 
No; Reg Smoking Cessation Counseling No

entered on: 2013                          [facility]   

 

               

 

                                        Social History TypeResponse

Alcohol

Past, Previous treatment: None.  Alcohol use interferes with work or home: No.  
Drinks more than intended: No.  Others hurt by drinking: No.  Ready to change: 
No.  Household alcohol concerns: No.

Substance Abuse

Previous Treatment: None.  IV drug use: No.  Drug use interferes with work/home:
No.  Ready to change: No.  Household substance abuse concerns: No.  Cessation 
Education Provided: No.

Smoking Status

Former smoker; Exposure to Tobacco Smoke None; Cigarette Smoking Last 365 Days 
No; Reg Smoking Cessation Counseling No

entered on: 2013                         [facility]       

 

           



                                                                                
   



Family History

                    



                                        No Data Provided for This Section       

             



                                                            



Advance Directives

                    



                                        No Data Provided for This Section       

             



                                                            



Functional Status

                    



                                        No Data Provided for This Section

## 2020-10-17 NOTE — DIAGNOSTIC IMAGING REPORT
EXAMINATION:  CHEST SINGLE (PORTABLE)    



INDICATION:      

^confusion

^[id number]

^2125

^Y



COMPARISON:  9/23/2020

     

FINDINGS:  AP view   



TUBES and LINES:  None.



LUNGS:  Limited by low lung volumes.  Diffuse bilateral airspace opacities.

Changes of lower lung fields, related to interstitial lung disease.



PLEURA:  No significant pleural effusion or pneumothorax.



HEART AND MEDIASTINUM:  The cardiomediastinal silhouette is unremarkable.    



BONES AND SOFT TISSUES:  No acute osseous lesion.  Soft tissues are

unremarkable.



UPPER ABDOMEN: No free air under the diaphragm.    



IMPRESSION: 

Diffuse bilateral airspace opacities, representing edema and/or pneumonia.

Changes of lower lung fields, related to interstitial lung disease.







Signed by: Dr. Manolo Hassan MD on 10/17/2020 9:49 PM

## 2020-10-17 NOTE — XMS REPORT
Continuity of Care Document

                             Created on: 10/18/2020



Jacoby Seaman

External Reference #: [id number]

: 1945

Sex: Male



Demographics





                          Address                   [address] [place]  [address]

 

                          Home Phone                [phone]

 

                          Preferred Language        English

 

                          Marital Status            Unknown

 

                          Catholic Affiliation     Unknown

 

                          Race                      Unknown

 

                          Ethnic Group              Unknown





Author





                          Author                    [employer]              [employer]

 

                          Address                   Unknown

 

                          Phone                     Unavailable







Care Team Providers





                    Care Team Member Name Role                Phone

 

                    [employer] Unavailable         Un

available



                                    



Problems

                    



                    Problem                         Status                      

   Onset Date       

                          Classification                         Date Reported  

         

                          Comments                         Source               

     

 

                          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              

           Active         

                    2020                                                  

     

                                                    [facility]                

    

 

                          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               

          Active          

                    10/03/2019                                                  

      

                                                     [facility]            

        

 

                    UNK                         Active                         0

2013           

                                                                                

       

                                        [facility]                    

 

                    OTHER                         Active                        

 2012         

                                                                                

     

                                        [facility]                    

 

                    Hearing loss (finding)                         Active       

                    

                          Problem                         2020            

    

                          [facility]            

       

 

                          Hypertensive disorder, systemic arterial (disorder)   

                      

Active                                                   Problem                

 

                    2020                                                  

 [facility]                    



                                                                                
                                                                       



Medications

                    



                    Medication                         Details                  

       Route        

                          Status                         Patient Instructions   

          

                          Ordering Provider                         Order Date  

               

                                        Source                    

 

                          Naprosyn 500 mg oral tablet                         50

0 mg, 1 tab, PO, BID, 14 

tab, Substitution Allowed, TAB                         PO                       

                    Active                                                  Claude enriquez                 

                          2012                         [facility]       

             

 

                          acetaminophen-hydrocodone 500 mg-5 mg oral tablet     

                    1-2 

tab, PO, Q4-6H, PRN, 15 tab, Pain, Substitution Allowed, Maintenance            
                    PO                         Active                           

        

                    [facility]                   

      

[facility]                    

 

                          Toradol 30 mg/mL injectable solution                  

       60 mg, 2 mL, Route:

IM, Drug form: INJ, ONCE, Start date: 12 8:34:00, Stop date: 12 
8:34:00                         IM                         No Longer Active     

                                             [facility]                         

2012                              [facility]                    

 

                          acetaminophen-hydrocodone 325 mg-5 mg oral tablet     

                    1 tab,

Route: PO, Drug Form: TAB, ONCE, STAT, Start date: 12 8:34:00, Stop date: 
12 8:34:00                         PO                         No Longer 

Active                                                   [facility]                 

 

                          2012                         [facility]       

             



                                                                                
                                                   



Allergies, Adverse Reactions, Alerts

                    



                    Substance                         Category                  

       Reaction     

                          Severity                         Reaction type        

       

                    Status                         Date Reported                

         

Comments                                Source                    

 

                          No Known Medication Allergies                         

Assertion                 

                                                                      Drug aller

gy           

                                                                                

       

                                        [facility]                    



                                                        



Immunizations

        



                                        No Data Provided for This Section



                                     



Results





                                        No Data Provided for This Section



                    



Pathology Reports





                                        No Data Provided for This Section       

             



                                                



Diagnostic Reports

                    



                    Report                         Value                        

 Date               

                                        Source                    

 

                          Sinus wo contrast CT                         Radiation

 Dose CTDIVOL = 0 (mGy): 

DLP = 504 (mGy-cm)

PROCEDURE INFORMATION: 

Exam: CT Maxillofacial Without Contrast, Sinus 

Exam date and time: 2020 2:48 PM 

Age: 74 years old 

Clinical indication: /j32.4 sinus problems 

TECHNIQUE: 

Imaging protocol: CT Maxillofacial without contrast. Focus on the sinuses. 

Radiation optimization: All CT scans at this facility use at least one of these 

dose optimization techniques: automated exposure control; mA and/or kV 

adjustment per patient size (includes targeted exams where dose is matched to 

clinical indication); or iterative reconstruction. 

COMPARISON: 

No relevant prior studies available. 

RADIATION DOSE METRICS: 

Total DLP (mGy-cm): 504 

FINDINGS: 

Frontal sinuses:  There is minimal mucosal thickening right frontal sinus.

Ethmoid air cells: There is minimal mucosal thickening in the solitary right 

mid ethmoid air cell and within the right frontal sinus. 

Sphenoid sinuses: Normal. No air-fluid levels. 

Maxillary sinuses: There is minimal attenuation bilateral maxillary ostium. 

Orbits: Orbits are normal. Globes are unremarkable. 

Auditory system: There is normal pneumatization middle ear cavity, antrum and 

mastoid air cells. 

Nasopharynx: The nasopharynx is normal. 

Nasal cavity/Septum: The nasal septum is deviated to the right. There is 

prominence of the mucosa of the middle and inferior nasal turbinates without 

nasal polyposis. 

Soft tissues: Unremarkable. 

Bones/joints:  The study is is performed in a bone algorithm.

IMPRESSION:  There is minimal mucosal thickening in the right frontal sinus in 

the solitary right mid ethmoid air cell.

The nasal septum is deviated to the right. There is prominence of the mucosa of 

the middle and inferior nasal turbinates without nasal polyposis. 

Nikolas Alex MD On 2020  17:12:51; [id number]

                          2020                         [facility]       

 

           

 

                          Spine lumbar 2 or 3 views DX                         E

xam: Spine lumbar 2 or 3 

views DX

Reason for Exam:  - M47.816   Spondylosis without myelopathy or radiculopathy, 
lumbar region

Comparison Exam: None

Discussion:

                                        5 non rib-bearing lumbar vertebral vipul

s are seen.  Vertebral body heights are 

maintained. Grade 1 retrolisthesis seen of L2 on L3. Moderate degenerative 
changes seen at the L2/L3 and L5/S1 levels. No suspicious osteoblastic or 
osteolytic lesions. Large amount of atherosclerotic plaque seen within the 
abdominal aorta .

Note that a lumbar spine x-ray cannot rule out ligamentous injuries or spinal 
cord abnormalities. No dilated loops of bowel within the visualized portions of 
the abdomen and pelvis.



Impression:

                                        1.  Grade 1 retrolisthesis seen of L2 on

 L3. Moderate degenerative changes seen 

at the L2/L3 and L5/S1 levels.

                          10/03/2019                          [facility]   

 

               



                                                                                
   



Consultation Notes

                    



                                        No Data Provided for This Section       

             



                                                            



Discharge Summaries

                    



                                        No Data Provided for This Section       

             



                                                            



History and Physicals

                    



                                        No Data Provided for This Section       

             



                                                                



Vital Signs

                     



                    Vital Sign                         Value                    

     Date           

                          Comments                         Source               

     

 

                    Heart Rate                         89                       

   2012       

                                                    [facility]                

    

 

                    Temperature Oral (F)                         98.3 F         

                

2012                                                   [facility]       

 

           

 

                    Respitory Rate                         20                   

       2012   

                                                    [facility]                

    

 

                    Diastolic (mm Hg)                         70                

          2012

                                                    [facility]                

   



 

                    Systolic (mm Hg)                         136                

          2012

                                                    [facility]                

   



 

                    Heart Rate                         88                       

   2012       

                                                    [facility]                

    

 

                    Diastolic (mm Hg)                         71                

          2012

                                                    [facility]                

   



 

                    Temperature Oral (F)                         98.1 F         

                

2012                                                   [facility]       

 

           

 

                    Respitory Rate                         18                   

       2012   

                                                    [facility]                

    

 

                    Systolic (mm Hg)                         138                

          2012

                                                    [facility]                

   



 

                    Weight                         81.818                       

   2012       

                                                    [facility]                

    

 

                    Height                         170.18 cm                    

     2012     

                                                    [facility]                

    



                                                                                
                                                                                
                                                                                
                     



Encounters

                    



                    Location                         Location Details           

              

Encounter Type                         Encounter Number                         

Reason For Visit                         Attending Provider                     

                    ADM Date                         DC Date                    

     Status      

                                        Source                    

 

                    [facility]                                                

  Emergency         

                    [id number]                                                

  

KACY NRIAGU                          2012                         Discharged                         [facility]                    

                          

                          Outpt Diag Services                         [id number]

00                     

                                             Ervin Jorge                        

  10/03/2019     

                    10[facility]04/2019                                                  

[facility]                   

                          

                    Outpatient                         [id number]             

                

                          Zander Abdi                          2020                                                  

[facility]                                                

  [place]YLE[facility]                

                    [id number]                         [facility]                    

     ADELAIDA [facility]                                                                           

 

                          Active                         [facility]           

         



                                                                                
                           



Procedures

                    



                    Procedure                         Code                      

   Date             

                    Perfomer                         Comments                   

      

Source                    

 

                    Cataract surgery                         [id number]          

                    

                                                                       [facility]                    



                                                            



Assessment and Plan

                    



                                        No Data Provided for This Section       

             



                                     



Plan of Care





                                        No Data Provided for This Section       

             



                                                                



Social History

                    



                    Social History                         Date                 

        Source      

             

 

                                        Social History TypeResponse

Alcohol

Past, Previous treatment: None.  Alcohol use interferes with work or home: No.  
Drinks more than intended: No.  Others hurt by drinking: No.  Ready to change: 
No.  Household alcohol concerns: No.

Substance Abuse

Previous Treatment: None.  IV drug use: No.  Drug use interferes with work/home:
No.  Ready to change: No.  Household substance abuse concerns: No.  Cessation 
Education Provided: No.

Smoking Status

Former smoker; Exposure to Tobacco Smoke None; Cigarette Smoking Last 365 Days 
No; Reg Smoking Cessation Counseling No

entered on: 2013                          [facility]   

 

               

 

                                        Social History TypeResponse

Alcohol

Past, Previous treatment: None.  Alcohol use interferes with work or home: No.  
Drinks more than intended: No.  Others hurt by drinking: No.  Ready to change: 
No.  Household alcohol concerns: No.

Substance Abuse

Previous Treatment: None.  IV drug use: No.  Drug use interferes with work/home:
No.  Ready to change: No.  Household substance abuse concerns: No.  Cessation 
Education Provided: No.

Smoking Status

Former smoker; Exposure to Tobacco Smoke None; Cigarette Smoking Last 365 Days 
No; Reg Smoking Cessation Counseling No

entered on: 2013                         [facility]       

 

           



                                                                                
   



Family History

                    



                                        No Data Provided for This Section       

             



                                                            



Advance Directives

                    



                                        No Data Provided for This Section       

             



                                                            



Functional Status

                    



                                        No Data Provided for This Section

## 2020-10-17 NOTE — XMS REPORT
Continuity of Care Document

                             Created on: 10/17/2020



AMARILYS AMADOR

External Reference #: [id number]

: 1945

Sex: Male



Demographics





                          Address                   [address]

[place]  [address]

 

                          Home Phone                [phone]

 

                          Preferred Language        English

 

                          Marital Status            Unknown

 

                          Sabianism Affiliation     Unknown

 

                          Race                      Unknown

 

                                        Additional Race(s) 



Other



Other



 

                          Ethnic Group              Non-





Author





                          Author                    [facility]

t

 

                          Organization              [facility]

 

                          Address                   [address]

[place]  [address]



 

                          Phone                     Unavailable







Support





                Name            Relationship    Address         Phone

 

                    COMFORT VILLEDA         ECON                [id number]

[place]  [address]                      Unavailable

 

                    FADUMO VILLEDAA         PRS                 [place]KNOWN

[place]  [address]                      [phone]

 

                    EMILY AMADORMEN    PRS                 [address]

[id number]

[place], [place]  [address] [phone]

 

                    MARJORIE  GE[place]GIA    PRS                 [address]

[address]

[place], [place]  [address] [phone]

 

                    FADUMO VILLEDAA         PRS                 N/A

[place], [place]  [address] [phone]

 

                    MARJORIE  [place]    PRS                 [address]

[id number]

[place], [place]  [address] [phone]







Care Team Providers





                    Care Team Member Name Role                Phone

 

                    MD KENDELL JORGE MD PCP                 [phone]

 

                    ADINA MASTERS Attphys             Unavailable

 

                    ELEAZAR PURI        Attphys             Unavailable

 

                    Tommy Abdi  Attphys             [phone]

 

                    Norberto Jorge Attphys             [phone]

 

                    LEONOR PADILLA      Attphys             Unavailable

 

                    ELEAZAR PURI        Admphys             Unavailable







Payers





           Payer Name Policy Type Policy Number Effective Date Expiration Date [facility]            [id number]                        

I [facility]







Problems





           Condition Name Condition Details Condition Category Status     Onset 

Date Resolution

Date            Last Treatment Date Treating Clinician Comments        Source

 

                          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              

           J32.4 CHRONIC 

PANSINUSITIS***WITH LANDMA                        Active                        
2020                                                                      
                          [facility]                     Diagnosis           Ac

tive              

2020 00:00:00              2020 14:38:00                           KENDELL Toro

 

                          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               

          M47.816 - 

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                     
  10/03/2019                                                                    
                           WILLIAM Moreno                     Diagnosis       

          Active

           2019-10-03 00:01:00            2019-10-03 10:28:00                   

    Betsy Toro

 

                          UNK                                               UNK 

                       Active                   

    2013                                                                  
                             [facility]                     Diagnosis         

  Active              

2013 00:00:00              2013 09:03:00                           M

terrierial [place]

 

                          OTHER                                             OTHE

R                        Active               

        2012                                                              
                                  [facility]                     Diagnosis     

            

Active     2012 00:00:00            2012 09:01:00                   

    [facility] [place]

 

       Facial laceration        Problem Active                                  

  [facility]

 

       Hyponatremia        Problem Active                                    [facility]

 

       Weakness        Problem Active                                    [facility]

 

       Pain in both knees        Problem Active                                 

   [facility]

 

       Right wrist pain        Problem Active                                   

 [facility]

 

                          Hearing loss (finding)                            Hear

ing loss (finding)           

            Active                                                Problem       
                2020                        rt ear                        
 [facility]      [facility]           

                      2020 

22:48:33                                                    Betsy Toro

 

                          Hypertensive disorder, systemic arterial (disorder)   

                      

Hypertensive disorder, systemic arterial (disorder)                        
Active                                                Problem                   
    2020                                                 [facility]      Active                   

              2020 22:48:33

                                                            Betsy Toro







Allergies, Adverse Reactions, Alerts





        Allergy Name Allergy Type Status  Severity Reaction(s) Onset Date Inacti

ve Date 

Treating Clinician        Comments                  Source

 

       No Known Allergies DA     Active U             2020-10-05 00:00:00       

               [facility]

 

       No Known Allergies DA     Active U             2020-02-10 00:00:00       

               [facility]

 

       No Known Allergies DA     Active U             2015 00:00:00       

               [facility]

 

       No Known Medication Allergies No Known Medication Allergies Active       

                                    

[facility] [place]







Social History





           Social Habit Start Date Stop Date  Quantity   Comments   Source

 

           Social History 2013 15:43:17 2013 15:43:17               

        Betsy Toro

 

           Sex Assigned At Birth 1945 00:00:00 1945 00:[birth year]:00 Male   

               [facility]







Medications





             Ordered Medication Name Filled Medication Name Start Date   Stop Da

te    Current 

Medication? Ordering Clinician Indication Dosage     Frequency  Signature (SIG) 

Comments                  Components                Source

 

        Naprosyn 500 mg oral tablet         2012 14:37:17         Yes     

Jodi Stewartzik          

                          500 mg, 1 tab, PO, BID, 14 tab, Substitution Allowed, 

TAB                           [facility] [place]

 

           acetaminophen-hydrocodone 500 mg-5 mg oral tablet             14:37:16            Yes        

Jodi Stewartzik                                                 1-2 tab, PO

, Q4-6H, PRN, 15 tab, Pain, Substitution 

Allowed, Maintenance                                         [facility] [place]

 

           Toradol 30 mg/mL injectable solution            2012 14:34:00  

          No         Jodi Stewarttatumk                                                          60 mg, 2 mL, Rou

te: IM, Drug form: INJ, ONCE, Start date: 12 

8:34:00, Stop date: 12 8:34:00                                         [facility]

orirema [facility]af[facility]ann

 

           acetaminophen-hydrocodone 325 mg-5 mg oral tablet             14:34:00            No         

Jodi Stewartwillem                                                 1 tab, Rout

e: PO, Drug Form: TAB, ONCE, STAT, Start 

date: 12 8:34:00, Stop date: 12 8:34:00                             

            [facility]

 

     Lisinopril Lisinopril           Yes            1         Daily           [facility]

[facility]

 

                          Amlodipine Besylate (Norvasc) 2.5 Mg TABLET Amlodipine

 Besylate (Norvasc) 2.5 Mg

TABLET       2020 00:00:00 No                1           Daily            

 [facility]

 

     Cefprozil Cefprozil      2020 00:00:00 No             1         Twice

 Daily           [facility]







Vital Signs





             Vital Name   Observation Time Observation Value Comments     Source

 

             Body Temperature 2020 09:00:00 98.1 [degF]               [facility]

 

             Weight       2020 19:25:00 180 [lb_av]               [facility]

 

             BMI (Body Mass Index) 2020 19:25:00 28.2 kg/m2               

 [facility]

 

             Heart Rate   2012 15:28:00                           [facility]

 [place]

 

             Temperature Oral (F) 2012 15:28:00 98.3 F                    

[facility]

 

             Respitory Rate 2012 15:28:00                           [facility]

[facility] [place]

 

             Diastolic (mm Hg) 2012 15:28:00                           [facility]

[facility] [place]

 

             Systolic (mm Hg) 2012 15:28:00                           [place]

[facility] [place]

 

             Heart Rate   2012 14:17:00                           [facility]

 [place]

 

             Diastolic (mm Hg) 2012 14:17:00                           [facility]

[facility] [place]

 

             Temperature Oral (F) 2012 14:17:00 98.1 F                    

[facility]

 

             Respitory Rate 2012 14:17:00                           [facility]

[facility] [place]

 

             Systolic (mm Hg) 2012 14:17:00                           [place]

[facility] [place]

 

             Weight       2012 14:16:00                           [facility]

 [place]

 

             Height       2012 14:16:00 170.18 cm                 [facility]

 [place]







Procedures





                Procedure       Date / Time Performed Performing Clinician [facility]

e

 

                Computed tomography of brain without radiopaque contrast 2020 00:00:00                 

[facility]

 

                Computed tomography of chest without contrast 2020 00:00:0

0                 [facility]

 

                Computed tomography of brain without radiopaque contrast 2020 00:00:00                 

[facility]

 

                CT maxillofacial area wo contrast 2020 00:00:00           

      [facility]

 

                Computed tomography of cervical spine without contrast 2020

3 00:00:00                 [facility]

 

                X-ray of chest, two views 2020 00:00:00                 [facility]

[facility]

 

                Cataract surgery                                 [facility]







Plan of Care





             Planned Activity Planned Date Details      Comments     Source

 

             Instructions              Pneumonia - Bacterial              [facility]







Encounters





             Start Date/Time End Date/Time Encounter Type Admission Type Attendi

[facility] Facility   Care Department Encounter ID    Source

 

          2020 14:28:00 2020 23:59:00 Outpatient           Zander Abdi [facility]                      [id number]               

 

        2020 14:28:00 2020 14:28:00 Outpatient                 [facility]    [id number]    [facility]

 

        2019 10:39:00 2019 23:59:00 Discharged [facility]  

[id number]                            [facility]

 

           2019-10-03 10:09:00 2019-10-03 23:59:00 Outpatient            Ervin Jorge 

[facility]              [id number]         

 

           2019 08:47:00 2019 08:47:00 Registered Clinic 3          

LEONOR PADILLA [facility]              [id number]        [facility]







Results





           Test Description Test Time  Test Comments Results    Result Comments 

Source

 

                CHEST SINGLE (PORTABLE) 2020-10-17 21:45:00                 

[facility]        : 1945        Sex: 
M************************************************************                   
                                                                   [place] [address]      Patient Name: AMARILYS AMADOR           
                     MR #: [id number]                  : 1945          
                         Age/Sex: 74/M  Acct #: [id number]                    
          Req #: [id number]  Adm Physician:                                     
                 Ordered by: BRITANY MASTERS MD                         
Report #: [id number]        Location: ER                                      
Room/Bed:                   
________________________________________________________________________________

___________________    Procedure: [id number] DX/CHEST SINGLE (PORTABLE)  Exam 
Date: 10/17/20                            Exam Time:                        
                       REPORT STATUS: Signed    EXAMINATION:  CHEST SINGLE 
(PORTABLE)          INDICATION:          confusion    2020    Y     
 COMPARISON:  2020           FINDINGS:  AP view         TUBES and LINES:  
None.      LUNGS:  Limited by low lung volumes.  Diffuse bilateral airspace 
opacities.   Changes of lower lung fields, related to interstitial lung disease.
      PLEURA:  No significant pleural effusion or pneumothorax.      HEART AND 
MEDIASTINUM:  The cardiomediastinal silhouette is unremarkable.          BONES 
AND SOFT TISSUES:  No acute osseous lesion.  Soft tissues are   unremarkable.   
   UPPER ABDOMEN: No free air under the diaphragm.          IMPRESSION:    
Diffuse bilateral airspace opacities, representing edema and/or pneumonia.   
Changes of lower lung fields, related to interstitial lung disease.            
Signed by: Dr. Kylah Bustamante MD on 10/17/2020 9:49 PM        Dictated By: KYLAH BUSTAMANTE MD  Electronically Signed By: KYLAH BUSTAMANTE MD on 10/17/20 2149  
Transcribed By: ZELDA on 10/17/20 2149       COPY TO:   BRITANY MASTERS MD                                                   

 

                CT BRAIN WO     2020-10-17 21:34:00                 

[facility]        : 1945        Sex: 
M************************************************************                   
                                                                   [facility]                        [address] [address]      Patient Name: AMARILYS AMADOR           
                     MR #: [id number]                  : 1945          
                         Age/Sex: 74/M  Acct #: [id number]                    
          Req #: [id number]  Adm Physician:                                     
                 Ordered by: BRITANY MASTERS MD                         
Report #: [id number]        Location: ER                                      
Room/Bed:                   
________________________________________________________________________________

___________________    Procedure: [id number] CT/CT BRAIN WO  Exam Date: 10/17/20 
                           Exam Time:                                       
        REPORT STATUS: Signed    EXAMINATION: Head CT without contrast.         
  HISTORY:Altered mental status.      COMPARISON:CT brain from 2020.      
TECHNIQUE: Multidetector axial images were obtained from the foramen magnum to  
 the vertex without contrast. The images were reconstructed using brain and bone
   algorithms.  Thin section brain images were reformatted into coronal and   
sagittal planes.   Dose modulation, iterative reconstruction, and/or weight 
based adjustment of   the mA/kV was utilized to reduce the radiation dose to as 
low as reasonably   achievable.      Intravenous contrast: None        IMAGE 
QUALITY: Acceptable.      FINDINGS:       Skull/scalp: No lytic or blastic. 
lesions.  No surgical changes.       Parenchyma: Unchanged nonspecific bilateral
 frontoparietal confluent   periventricular, patchy and confluent subcortical 
and deep white matter   hypodensities are likely related to small vessel 
ischemic changes.   Chronic lacunar infarct in the left thalamocapsular region. 
  No acute hemorrhage, mass or acute major vascular territorial infarct.       
Arteries: No density suggestive of thrombosis.  Atherosclerotic   calcification 
in bilateral carotid siphon and vertebral basilar system which is   associated 
with dolichoectasia.        Dural sinuses: No abnormal density suggestive of 
thrombosis.        Ventricles: Moderate compensated dilatation due to volume 
loss. No acute   hydrocephalus.       Extra-axial spaces: Unchanged prominent 
prepontine and premedullary cistern   may be secondary to underlying arachnoid 
cyst or due to ectatic course of   vertebrobasilar vessels.       Brain volume: 
Normal for age.       Craniocervical junction: No mass, Chiari malformation, or 
basilar   invagination.       Sella: Partial empty sella.        
Paranasal/mastoid sinuses: Imaged portions unremarkable.         IMPRESSION:   
No acute intracranial abnormality.   No change since CT brain from 2020.   
   Chronic findings:   1. Diffuse supratentorial white matter microvascular 
ischemic changes.   2. Chronic lacunar infarct in left thalamocapsular region.  
 3. Mild generalized cerebral volume loss.      Signed by: Dr. Honey Pineda M.D. on 10/17/2020 9:41 PM        Dictated By: HONEY PINEDA MD  
Electronically Signed By: HONEY PINEDA MD on 10/17/20 2141  Transcribed By: 
ZELDA on 10/17/20 2141       COPY TO:   BRITANY MASTERS MD               

                      

 

                    HEPATIC FUNCTION PANEL 2020-10-11 02:06:00   

 

                                        Test Item

 

             TOTAL PROTEIN (test code = PROT) 6.7 gram/dL  6.4-8.2      N       

      

 

             ALBUMIN (test code = ALB) 3.6 g/dL     3.4-5.0      N             

 

             GLOBULIN (test code = GLOB) 3.1 gram/dL  2.7-4.2      N            

 

 

             ALBUMIN/GLOBULIN RATIO (test code = A/G) 1.2          0.75-1.50    

N             

 

             BILIRUBIN TOTAL (test code = BILT) 0.40 mg/dL   0.0-1.0      N     

        

 

             BILIRUBIN DIRECT (test code = BILD) 0.11 mg/dL   0.0-0.20     N    

         

 

             SGOT/AST (test code = AST) 20 IUnit/L   15-37        N             

 

             SGPT/ALT (test code = ALT) 17 IUnit/L   12-78        N             

 

             ALKALINE PHOSPHATASE TOTAL (test code = ALKP) 61 IUnit/L     

     N            **Note change 

in reference range due to change in reagent.**





VITAMIN B122020-10-11 02:06:00* 



             Test Item    Value        Reference Range Interpretation Comments

 

             VITAMIN B12 (test code = VITB12) 546 pg/mL    193-986      N       

      





FOLIC ACID2020-10-11 02:06:00* 



             Test Item    Value        Reference Range Interpretation Comments

 

             FOLIC ACID (test code = FOL) 17.7 ng/mL   3.10-17.50   H           

  





THYROID STIMULATING HORMONE2020-10-11 02:06:00* 



             Test Item    Value        Reference Range Interpretation Comments

 

             THYROID STIMULATING HORMONE (test code = TSH) 0.362 uIU/mL 0.36-3.7

4    N            TSH 

REFERENCE RANGES:  EUTHYROID:     0.35 - 4.3 mIU/mL                       HYPO  
   :     > 5.5      mIU/mL                       HYPER    :     < 0.35     
mIU/mL





VITAMIN B6/PYRIDOXINE2020-10-11 02:06:00* 



             Test Item    Value        Reference Range Interpretation Comments

 

             VITAMIN B6/PYRIDOXINE (test code = VITB6) 11.3 ug/L    5.3-46.7    

              Performed At:  

[facility] [id number] Gregorio MD 
Ph:[phone]





VIT B1 WHOLE BLOOD2020-10-11 02:06:00* 



             Test Item    Value        Reference Range Interpretation Comments

 

             VIT B1 WHOLE BLOOD (test code = IHIJ3UD) 74.5 nmol/L  66.5-200.0   

             Performed At: 

 [facility] [id number] Gregorio MD 
Ph:[phone]





HEPATIC FUNCTION PANEL2020-10-10 21:06:00* 



             Test Item    Value        Reference Range Interpretation Comments

 

             TOTAL PROTEIN (test code = PROT) 6.7 gram/dL  6.4-8.2      N       

      

 

             ALBUMIN (test code = ALB) 3.6 g/dL     3.4-5.0      N             

 

             GLOBULIN (test code = GLOB) 3.1 gram/dL  2.7-4.2      N            

 

 

             ALBUMIN/GLOBULIN RATIO (test code = A/G) 1.2          0.75-1.50    

N             

 

             BILIRUBIN TOTAL (test code = BILT) 0.40 mg/dL   0.0-1.0      N     

        

 

             BILIRUBIN DIRECT (test code = BILD) 0.11 mg/dL   0.0-0.20     N    

         

 

             SGOT/AST (test code = AST) 20 IUnit/L   15-37        N             

 

             SGPT/ALT (test code = ALT) 17 IUnit/L   12-78        N             

 

             ALKALINE PHOSPHATASE TOTAL (test code = ALKP) 61 IUnit/L     

     N            **Note change 

in reference range due to change in reagent.**





VITAMIN B122020-10-10 21:06:00* 



             Test Item    Value        Reference Range Interpretation Comments

 

             VITAMIN B12 (test code = VITB12) 546 pg/mL    193-986      N       

      





FOLIC ACID2020-10-10 21:06:00* 



             Test Item    Value        Reference Range Interpretation Comments

 

             FOLIC ACID (test code = FOL) 17.7 ng/mL   3.10-17.50   H           

  





THYROID STIMULATING HORMONE2020-10-10 21:06:00* 



             Test Item    Value        Reference Range Interpretation Comments

 

             THYROID STIMULATING HORMONE (test code = TSH) 0.362 uIU/mL 0.36-3.7

4    N            TSH 

REFERENCE RANGES:  EUTHYROID:     0.35 - 4.3 mIU/mL                       HYPO  
   :     > 5.5      mIU/mL                       HYPER    :     < 0.35     
mIU/mL





VITAMIN B6/PYRIDOXINE2020-10-10 21:06:00* 



             Test Item    Value        Reference Range Interpretation Comments

 

             VITAMIN B6/PYRIDOXINE (test code = VITB6) 11.3 ug/L    5.3-46.7    

              Performed At:  

[facility] [id number] Sanjai MD 
Ph:[phone]





VIT B1 WHOLE BLOOD2020-10-10 21:06:00* 



             Test Item    Value        Reference Range Interpretation Comments

 

             VIT B1 WHOLE BLOOD (test code = [id number])  nmol/L             

              





BASIC METABOLIC PANEL2020-10-09 07:38:00* 



             Test Item    Value        Reference Range Interpretation Comments

 

             SODIUM (test code = NA) 136 mmol/L   136-145      N             

 

             POTASSIUM (test code = K) 4.3 mmol/L   3.5-5.1      N             

 

             CHLORIDE (test code = CL) 104.0 mmol/L        N             

 

             CARBON DIOXIDE (test code = CO2) 25.0 mmol/L  21-32        N       

      

 

             ANION GAP (test code = GAP) 11.3         10-20        N            

 

 

             GLUCOSE (test code = GLU) 91 mg/dL            N             

 

             BLOOD UREA NITROGEN (test code = BUN) 8 mg/dL      7-18         N  

           

 

             GLOMERULAR FILTRATION RATE (test code = GFR) > 60 mL/min  >=60     

                 Estimated GFR by

 using Modified MDRD formula.Chronic kidney disease is defined as either kidney 
damageor GFR <60 mL/min/1.73 m2 for >3 months.

 

             CREATININE (test code = CREAT) 0.50 mg/dL   0.7-1.3      L         

    

 

             BUN/CREATININE RATIO (test code = BUN/CREA) 15.0         10-20     

   N             

 

             CALCIUM (test code = CA) 8.2 mg/dL    8.5-10.1     L             





BASIC METABOLIC PANEL2020-10-08 12:20:00* 



             Test Item    Value        Reference Range Interpretation Comments

 

             SODIUM (test code = NA) 135 mmol/L   136-145      L             

 

             POTASSIUM (test code = K) 3.8 mmol/L   3.5-5.1      N             

 

             CHLORIDE (test code = CL) 102.0 mmol/L        N             

 

             CARBON DIOXIDE (test code = CO2) 28.0 mmol/L  21-32        N       

     Previously reported result:

 28.0 mmol/LEdited by: [email or web address] on 10/08/20:1220

 

             ANION GAP (test code = GAP) 8.8          10-20        L            

 

 

             GLUCOSE (test code = GLU) 105 mg/dL           N             

 

             BLOOD UREA NITROGEN (test code = BUN) 6 mg/dL      7-18         L  

           

 

             GLOMERULAR FILTRATION RATE (test code = GFR) > 60 mL/min  >=60     

                 Estimated GFR by

 using Modified MDRD formula.Chronic kidney disease is defined as either kidney 
damageor GFR <60 mL/min/1.73 m2 for >3 months.

 

             CREATININE (test code = CREAT) 0.70 mg/dL   0.7-1.3      N         

    

 

             BUN/CREATININE RATIO (test code = BUN/CREA) 8.6          10-20     

   L             

 

             CALCIUM (test code = CA) 8.7 mg/dL    8.5-10.1     N             





BASIC METABOLIC PANEL2020-10-08 11:57:00* 



             Test Item    Value        Reference Range Interpretation Comments

 

             SODIUM (test code = NA) 135 mmol/L   136-145      L             

 

             POTASSIUM (test code = K) 3.8 mmol/L   3.5-5.1      N             

 

             CHLORIDE (test code = CL) 102.0 mmol/L        N             

 

             CARBON DIOXIDE (test code = CO2)  mmol/L      21-32                

      

 

             ANION GAP (test code = GAP)              10-20                     

 

 

             GLUCOSE (test code = GLU)  mg/dL                            

 

             BLOOD UREA NITROGEN (test code = BUN)  mg/dL       7-18            

           

 

             GLOMERULAR FILTRATION RATE (test code = GFR)  mL/min      >=60     

                  

 

             CREATININE (test code = CREAT)  mg/dL       0.7-1.3                

    

 

             BUN/CREATININE RATIO (test code = BUN/CREA)              10-20     

                 

 

             CALCIUM (test code = CA)  mg/dL       8.5-10.1                   





BASIC METABOLIC PANEL2020-10-08 11:57:00* 



             Test Item    Value        Reference Range Interpretation Comments

 

             SODIUM (test code = NA) 135 mmol/L   136-145      L             

 

             POTASSIUM (test code = K) 3.8 mmol/L   3.5-5.1      N             

 

             CHLORIDE (test code = CL) 102.0 mmol/L        N             

 

             CARBON DIOXIDE (test code = CO2) 28.0 mmol/L  21-32        N       

      

 

             ANION GAP (test code = GAP)              10-20                     

 

 

             GLUCOSE (test code = GLU) 105 mg/dL           N             

 

             BLOOD UREA NITROGEN (test code = BUN) 6 mg/dL      7-18         L  

           

 

             GLOMERULAR FILTRATION RATE (test code = GFR) > 60 mL/min  >=60     

                 Estimated GFR by

 using Modified MDRD formula.Chronic kidney disease is defined as either kidney 
damageor GFR <60 mL/min/1.73 m2 for >3 months.

 

             CREATININE (test code = CREAT) 0.70 mg/dL   0.7-1.3      N         

    

 

             BUN/CREATININE RATIO (test code = BUN/CREA) 8.6          10-20     

   L             

 

             CALCIUM (test code = CA) 8.7 mg/dL    8.5-10.1     N             





CBC W/AUTO DIFF2020-10-08 11:45:00* 



             Test Item    Value        Reference Range Interpretation Comments

 

             WHITE BLOOD CELL (test code = WBC) 5.0 K/mm3    4.5-12.5     N     

        

 

             RED BLOOD CELL (test code = RBC) 4.03 mill/mm3 4.0-5.8      N      

       

 

             HEMOGLOBIN (test code = HGB) 12.8 gram/dL 13.0-17.5    L           

  

 

             HEMATOCRIT (test code = HCT) 37.9 %       42.0-52.0    L           

  

 

             MEAN CELL VOLUME (test code = MCV) 94.0 fL      80-98        N     

        

 

             MEAN CELL HGB (test code = MCH) 31.8 picogram 27.0-33.0    N       

      

 

             MEAN CELL HGB CONCETRATION (test code = MCHC) 33.8 gram/dL 33.0-36.

0    N             

 

             RED CELL DISTRIBUTION WIDTH (test code = RDW) 12.9 %       11.6-16.

2    N             

 

             RED CELL DISTRIBUTION WIDTH SD (test code = RDW-SD) 44.6 fL      37

.0-51.0    N             

 

             PLATELET COUNT (test code = PLT) 243 K/mm3    150-450      N       

      

 

             MEAN PLATELET VOLUME (test code = MPV) 9.5 fL       6.7-11.0     N 

            

 

             NEUTROPHIL % (test code = NT%) 69.6 %       39.0-69.0    H         

    

 

             IMMATURE GRANULOCYTE % (test code = IG%) 0.2 %        0.0-5.0      

N             

 

             LYMPHOCYTE % (test code = LY%) 15.3 %       25.0-55.0    L         

    

 

             MONOCYTE % (test code = MO%) 10.7 %       0.0-10.0     H           

  

 

             EOSINOPHIL % (test code = EO%) 2.8 %        0.0-5.0      N         

    

 

             BASOPHIL % (test code = BA%) 1.4 %        0.0-1.0      H           

  

 

             NUCLEATED RBC % (test code = NRBC%) 0.0 %        0-0          N    

         

 

             NEUTROPHIL # (test code = NT#) 3.46 K/mm3   1.8-7.7      N         

    

 

             IMMATURE GRANULOCYTE # (test code = IG#) 0.01 x10 3/uL 0-0.03      

 N             

 

             LYMPHOCYTE # (test code = LY#) 0.76 K/mm3   1.0-5.0      L         

    

 

             MONOCYTE # (test code = MO#) 0.53 K/mm3   0-0.8        N           

  

 

             EOSINOPHIL # (test code = EO#) 0.14 K/mm3   0.0-0.5      N         

    

 

             BASOPHIL # (test code = BA#) 0.07 K/mm3   0.0-0.2      N           

  

 

             NUCLEATED RBC # (test code = NRBC#) 0.00 K/mm3   0.0-0.1      N    

         

 

             MANUAL DIFF REQUIRED (test code = MDIFF) NO                        

              





BASIC METABOLIC PANEL2020-10-07 11:30:00* 



             Test Item    Value        Reference Range Interpretation Comments

 

             SODIUM (test code = NA) 133 mmol/L   136-145      L             

 

             POTASSIUM (test code = K) 4.4 mmol/L   3.5-5.1      N             

 

             CHLORIDE (test code = CL) 101.0 mmol/L        N             

 

             CARBON DIOXIDE (test code = CO2) 25.0 mmol/L  21-32        N       

      

 

             ANION GAP (test code = GAP) 11.4         10-20        N            

 

 

             GLUCOSE (test code = GLU) 101 mg/dL           N             

 

             BLOOD UREA NITROGEN (test code = BUN) 6 mg/dL      7-18         L  

           

 

             GLOMERULAR FILTRATION RATE (test code = GFR) > 60 mL/min  >=60     

                 Estimated GFR by

 using Modified MDRD formula.Chronic kidney disease is defined as either kidney 
damageor GFR <60 mL/min/1.73 m2 for >3 months.

 

             CREATININE (test code = CREAT) 0.50 mg/dL   0.7-1.3      L         

    

 

             BUN/CREATININE RATIO (test code = BUN/CREA) 11.5         10-20     

   N             

 

             CALCIUM (test code = CA) 8.5 mg/dL    8.5-10.1     N             





- MRI BRAIN W/O CONTRAST2020-10-06 19:43:00 FAX:         Boriskie,Nikolas J  MD 
[phone]    [place]: B   St: ADM FAX:         Mandi Conway   
[phone]   ----------------------------------------
---------------------------------------  Name:   AMARILYS AMADOR                   
  [facility]                     : 1945  Age/S: 74/M           [address]
 [facility]                Unit #: [id number]      Loc: [place]  [address]              Phys: Mandi Conway NP                            
                  Acct: [id number] Dis Date:               Status: ADM IN     
                            PHONE #: [phone]     Exam Date:     10/06/2020 
    1720                   FAX #: [phone]     Reason: AMS, TIA             
                              EXAMS:                                            
   CPT CODE:      [id number] MRI BRAIN W/O CONTRAST                     13354    
                EXAM: MRI of the brain without contrast;               INFORMATI
ON: AMS, TIA;               TECHNIQUE AND FINDINGS:       Multiplanar, multisequ
ence scans of the brain were obtained including       diffusion-weighted studies
.       The diffusion scans show no areas of restricted diffusion.       Extensi
ve gliosis is seen involving the periventricular and deep white       matter, be
st demonstrated on the FLAIR study.       Otherwise, unremarkable gray/white mat
ter differentiation.       The gradient echo study shows no evidence of intra or
 extra-axial       hemorrhage.       No mass lesions or midline shift.       Exp
ected flow-voids are seen within vascular structures.       Symmetric dilatation
 of the ventricle; sulci and basilar cisterns are       intact.                 
IMPRESSION:         1. No evidence of intracranial hemorrhage or acute ischemic 
lesions.         2. Extensive chronic ischemic white matter changes.         3. 
Moderate atrophy.                   Location code: [facility]                   ** Elec
tronically Signed by FORREST Rodriguez **           **             on 10/06/2
020 at 1943             **                      Reported and signed by: Luis Fernando Rodriguez M.D.              CC: Nikolas Mario MD; Mandi Conway NP     
                                                                                
       Technologist: WESTLEY ORTIZRT - MRI                                 [facility] Date/Time/By: 10/06/2020 () : By: [email or web address]           Orig Print D/T: S:
 10/06/2020 ()                         PAGE  1                       Signed 
Report                               BASIC METABOLIC PANEL2020-10-06 08:26:00* 



             Test Item    Value        Reference Range Interpretation Comments

 

             SODIUM (test code = NA) 126 mmol/L   136-145      L             

 

             POTASSIUM (test code = K) 3.9 mmol/L   3.5-5.1      N             

 

             CHLORIDE (test code = CL) 94.0 mmol/L         L             

 

             CARBON DIOXIDE (test code = CO2) 26.0 mmol/L  21-32        N       

      

 

             ANION GAP (test code = GAP) 9.9          10-20        L            

 

 

             GLUCOSE (test code = GLU) 87 mg/dL            N             

 

             BLOOD UREA NITROGEN (test code = BUN) 7 mg/dL      7-18         N  

           

 

             GLOMERULAR FILTRATION RATE (test code = GFR) > 60 mL/min  >=60     

                 Estimated GFR by

 using Modified MDRD formula.Chronic kidney disease is defined as either kidney 
damageor GFR <60 mL/min/1.73 m2 for >3 months.

 

             CREATININE (test code = CREAT) 0.50 mg/dL   0.7-1.3      L         

    

 

             BUN/CREATININE RATIO (test code = BUN/CREA) 13.4         10-20     

   N             

 

             CALCIUM (test code = CA) 8.4 mg/dL    8.5-10.1     L             





SODIUM2020-10-05 22:05:00* 



             Test Item    Value        Reference Range Interpretation Comments

 

             SODIUM (test code = NA) 126 mmol/L   136-145      L             





SPECIMEN COMMENTS: pls obtain q6hr for th next 24hrs.AB TREPONEMA2020-10-05 
20:06:00* 



             Test Item    Value        Reference Range Interpretation Comments

 

             AB TREPONEMA (test code = TREPAB) Nonreactive Index NonReactive    

            





HEPATIC FUNCTION PANEL2020-10-05 19:55:00* 



             Test Item    Value        Reference Range Interpretation Comments

 

             TOTAL PROTEIN (test code = PROT) 6.7 gram/dL  6.4-8.2      N       

      

 

             ALBUMIN (test code = ALB) 3.6 g/dL     3.4-5.0      N             

 

             GLOBULIN (test code = GLOB) 3.1 gram/dL  2.7-4.2      N            

 

 

             ALBUMIN/GLOBULIN RATIO (test code = A/G) 1.2          0.75-1.50    

N             

 

             BILIRUBIN TOTAL (test code = BILT) 0.40 mg/dL   0.0-1.0      N     

        

 

             BILIRUBIN DIRECT (test code = BILD) 0.11 mg/dL   0.0-0.20     N    

         

 

             SGOT/AST (test code = AST) 20 IUnit/L   15-37        N             

 

             SGPT/ALT (test code = ALT) 17 IUnit/L   12-78        N             

 

             ALKALINE PHOSPHATASE TOTAL (test code = ALKP) 61 IUnit/L     

     N            **Note change 

in reference range due to change in reagent.**





VITAMIN B122020-10-05 19:55:00* 



             Test Item    Value        Reference Range Interpretation Comments

 

             VITAMIN B12 (test code = VITB12) 546 pg/mL    193-986      N       

      





FOLIC ACID2020-10-05 19:55:00* 



             Test Item    Value        Reference Range Interpretation Comments

 

             FOLIC ACID (test code = FOL) 17.7 ng/mL   3.10-17.50   H           

  





THYROID STIMULATING HORMONE2020-10-05 19:55:00* 



             Test Item    Value        Reference Range Interpretation Comments

 

             THYROID STIMULATING HORMONE (test code = TSH) 0.362 uIU/mL 0.36-3.7

4    N            TSH 

REFERENCE RANGES:  EUTHYROID:     0.35 - 4.3 mIU/mL                       HYPO  
   :     > 5.5      mIU/mL                       HYPER    :     < 0.35     
mIU/mL





VITAMIN B6/PYRIDOXINE2020-10-05 19:55:00* 



             Test Item    Value        Reference Range Interpretation Comments

 

             VITAMIN B6/PYRIDOXINE (test code = VITB6)  ng/mL       3-60        

               





VIT B1 WHOLE BLOOD2020-10-05 19:55:00* 



             Test Item    Value        Reference Range Interpretation Comments

 

             VIT B1 WHOLE BLOOD (test code = [id number])  nmol/L             

              





DRUGS OF ABUSE SCREEN UR2020-10-05 19:25:00* 



             Test Item    Value        Reference Range Interpretation Comments

 

             UA PH DIPSTICK (test code = MARIEL) 7.0          5.0-8.0              

      

 

             URN COCAINE (test code = COCAURN) NEGATIVE     <300 ng/mL          

       

 

             URN CANNABINOIDS (test code = CANNABURN) NEGATIVE     <50 ng/mL    

              

 

             URN AMPHETAMINE (test code = AMPHETURN) NEGATIVE     <1000 ng/mL   

             

 

             URN BARBITURATE (test code = BARBITURN) NEGATIVE     <200 ng/mL    

             

 

             URN BENZODIAZEPINE (test code = BENZOURN) NEGATIVE     <200 ng/mL  

               

 

             URN OPIATES (test code = OPIATURN) NEGATIVE     <300 ng/mL         

        

 

             URN PHENCYCLIDINE (PCP) (test code = PHENCURN) NEGATIVE     <25 ng/

mL                  

 

             URN METHADONE (test code = METHAURN) NEGATIVE     <300 ng/mL       

          





SODIUM2020-10-05 19:14:00* 



             Test Item    Value        Reference Range Interpretation Comments

 

             SODIUM (test code = NA) 124 mmol/L   136-145      L            Resu

lts called to [id number] by 

V.LAB.SPR 10/05/20 1914Critical results verified and read back by Nurse? Y





HEPATIC FUNCTION PANEL2020-10-05 19:13:00* 



             Test Item    Value        Reference Range Interpretation Comments

 

             TOTAL PROTEIN (test code = PROT) 6.7 gram/dL  6.4-8.2      N       

      

 

             ALBUMIN (test code = ALB) 3.6 g/dL     3.4-5.0      N             

 

             GLOBULIN (test code = GLOB) 3.1 gram/dL  2.7-4.2      N            

 

 

             ALBUMIN/GLOBULIN RATIO (test code = A/G) 1.2          0.75-1.50    

N             

 

             BILIRUBIN TOTAL (test code = BILT) 0.40 mg/dL   0.0-1.0      N     

        

 

             BILIRUBIN DIRECT (test code = BILD) 0.11 mg/dL   0.0-0.20     N    

         

 

             SGOT/AST (test code = AST) 20 IUnit/L   15-37        N             

 

             SGPT/ALT (test code = ALT) 17 IUnit/L   12-78        N             

 

             ALKALINE PHOSPHATASE TOTAL (test code = ALKP) 61 IUnit/L     

     N            **Note change 

in reference range due to change in reagent.**





VITAMIN B122020-10-05 19:13:00* 



             Test Item    Value        Reference Range Interpretation Comments

 

             VITAMIN B12 (test code = VITB12)  pg/mL       193-986              

      





FOLIC ACID2020-10-05 19:13:00* 



             Test Item    Value        Reference Range Interpretation Comments

 

             FOLIC ACID (test code = FOL)  ng/mL       3.10-17.50               

  





THYROID STIMULATING HORMONE2020-10-05 19:13:00* 



             Test Item    Value        Reference Range Interpretation Comments

 

             THYROID STIMULATING HORMONE (test code = TSH)  uIU/mL      0.36-3.7

4                  





VITAMIN B6/PYRIDOXINE2020-10-05 19:13:00* 



             Test Item    Value        Reference Range Interpretation Comments

 

             VITAMIN B6/PYRIDOXINE (test code = VITB6)  ng/mL       3-60        

               





VIT B1 WHOLE BLOOD2020-10-05 19:13:00* 



             Test Item    Value        Reference Range Interpretation Comments

 

             VIT B1 WHOLE BLOOD (test code = [id number])  nmol/L             

              





AMMONIA2020-10-05 19:11:00* 



             Test Item    Value        Reference Range Interpretation Comments

 

             AMMONIA (test code = AMM) 46 umol/L    11-32        H             





DRUGS OF ABUSE SCREEN UR2020-10-05 18:38:00* 



             Test Item    Value        Reference Range Interpretation Comments

 

             UA PH DIPSTICK (test code = MARIEL) 7.0          5.0-8.0              

      

 

             URN COCAINE (test code = COCAURN)              <300 ng/mL          

       

 

             URN CANNABINOIDS (test code = CANNABURN)              <50 ng/mL    

              

 

             URN AMPHETAMINE (test code = AMPHETURN)              <1000 ng/mL   

             

 

             URN BARBITURATE (test code = BARBITURN)              <200 ng/mL    

             

 

             URN BENZODIAZEPINE (test code = BENZOURN)              <200 ng/mL  

               

 

             URN OPIATES (test code = OPIATURN)              <300 ng/mL         

        

 

             URN PHENCYCLIDINE (PCP) (test code = PHENCURN)              <25 ng/

mL                  

 

             URN METHADONE (test code = METHAURN)              <300 ng/mL       

          





UR NA,RANDOM2020-10-05 17:59:00* 



             Test Item    Value        Reference Range Interpretation Comments

 

             UR NA,RANDOM (test code = AMRITA) 63 mmol/L           N         

    





UR OSMOLALITY RANDOM2020-10-05 17:59:00* 



             Test Item    Value        Reference Range Interpretation Comments

 

             UR OSMOLALITY RANDOM (test code = OSMOU) 254.5 mOsm/kg       

 N             





UR NA,RANDOM2020-10-05 17:56:00* 



             Test Item    Value        Reference Range Interpretation Comments

 

             UR NA,RANDOM (test code = AMRIAT) 63 mmol/L           N         

    





UR OSMOLALITY RANDOM2020-10-05 17:56:00* 



             Test Item    Value        Reference Range Interpretation Comments

 

             UR OSMOLALITY RANDOM (test code = OSMOU)  mOsm/kg            

              





GLUBED2020-10-05 16:24:00* 



             Test Item    Value        Reference Range Interpretation Comments

 

             GLUBED (test code = GLUBED) 105 mg/dL           N            

Performed by certified  

at [facility]





- CTA NECK2020-10-05 14:19:00  Name: AMARILYS AMADOR                      [facility]                     : 1945 Age/S: 74  / M         [address] 
[address]                Unit #: [id number]     Loc:               [place]  [address]              Phys: Mandi Conway  NP                             
                 Acct: [id number]  Dis Date:               Status: ADM IN     
                             PHONE #: [phone]     Exam Date: 10/05/2020  
1224                     FAX #: [phone]      Reason: AMS, TIA              
                              EXAMS:                                            
   CPT CODE:      [id number] CTA NECK                                   [address]    
                HISTORY: AMS, TIA               TECHNIQUE: Cerebral and Cervical
 CT angiography was acquired in the       axial plane after bolus IV 
administration of iodinated contrast.       Multiplanar, maximum intensity 
projection (MIP), and volume rendered       reconstructions were created on the 
3-D workstation. Automated       exposure control for dose reduction.           
    COMPARISON: CT scan of the brain earlier this morning               
FINDINGS:               Bilateral distal cervical ICAs are patent. However there
 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  
     on the left side and extends into the left internal carotid artery.       
However there is no evidence of significant stenosis on either side       (left 
internal carotid artery demonstrates 35% stenosis from       atherosclerosis).  
     Atherosclerotic calcifications without significant stenosis involving      
 the bilateral cavernous ICA segments. Bilateral petrous and       supraclinoid 
ICA segments are patent. Normal enhancement of the       bilateral ophthalmic 
arteries. Bilateral A1 and distal TOAN segments       are patent. Anterior 
communicating artery is present. Bilateral M1 and       distal MCA branches are 
patent. No aneurysm.               Bilateral distal vertebral arteries are 
patent although the left       vertebral artery is hypoplastic compared to the 
right. Basilar artery       is patent. Bilateral PICAs and SCAs are patent. Norm
al appearance of       the basilar tip. Bilateral P1 and distal PCA segments are
 patent.        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 No aneu
rysm.               Dural venous sinuses and proximal internal jugular veins are
 patent.                Please see the CT scan of the brain earlier today for no
nvascular       findings                         IMPRESSION:                   A
therosclerotic disease in the bilateral carotid bulbs without         significan
t foraminal narrowing.         Variant anatomy of the intracranial arteries with
out occlusion or         stenosis.                   Location: [facility]     PAGE  1  
                     Signed Report                    (CONTINUED)   Name: AMARILYS DAVIS                      [facility] [facility]                     : 1945 A
ge/S: 74  / M         [address]                Unit #: [id number]     Loc
:               [place]  [address]              Phys: Mandi Conway NP  
                                            Acct: [id number]  Dis Date:       
        Status: ADM IN                                  PHONE #: [phone]   
  Exam Date: 10/05/2020  1224                     FAX #: [phone]      Reaso
n: AMS, TIA                                            EXAMS:                   
                            CPT CODE:      [id number] CTA NECK                   
                [address]               <Continued>                     ** 
Electronically Signed by Aleksander Pham MD on 10/05/2020 at 1419 **                
      Reported and signed by: Aleksander Pham MD                                    
 CC: Nikolas Mario MD; Mandi Conway NP                               
                                                             
Technologist:Driss Zamarripa RT(R),(MR),(CT)   CTDI:        DLP:        Trnscb 
Date/Time: 10/05/2020 () t.SDR.RR31                       Orig Print D/T: S:
 10/05/2020 ()      PAGE  2                       Signed Report             
                  - CTA HEAD2020-10-05 14:19:00  Name: AMARILYS AMADOR             
         [facility]                     : 1945 Age/S: 74  / M      
   [address] [facility]                Unit #: [id number]     Loc:               
[place]  [address]              Phys: Mandi Conway NP                  
                            Acct: [id number]  Dis Date:               Status: 
ADM IN                                  PHONE #: [phone]     Exam Date: 
10/05/2020  1224                     FAX #: [phone]      Reason: AMS, TIA  
                                          EXAMS:                                
               CPT CODE:      [id number] CTA HEAD                                
   [address]                    HISTORY: AMS, TIA               TECHNIQUE: Cerebral 
and Cervical CT angiography was acquired in the       axial plane after bolus IV
 administration of iodinated contrast.       Multiplanar, maximum intensity 
projection (MIP), and volume rendered       reconstructions were created on the 
3-D workstation. Automated       exposure control for dose reduction.           
    COMPARISON: CT scan of the brain earlier this morning               
FINDINGS:               Bilateral distal cervical ICAs are patent. However there
 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  
     on the left side and extends into the left internal carotid artery.       
However there is no evidence of significant stenosis on either side       (left 
internal carotid artery demonstrates 35% stenosis from       atherosclerosis).  
     Atherosclerotic calcifications without significant stenosis involving      
 the bilateral cavernous ICA segments. Bilateral petrous and       supraclinoid 
ICA segments are patent. Normal enhancement of the       bilateral ophthalmic 
arteries. Bilateral A1 and distal TOAN segments       are patent. Anterior 
communicating artery is present. Bilateral M1 and       distal MCA branches are 
patent. No aneurysm.               Bilateral distal vertebral arteries are 
patent although the left       vertebral artery is hypoplastic compared to the 
right. Basilar artery       is patent. Bilateral PICAs and SCAs are patent. Norm
al appearance of       the basilar tip. Bilateral P1 and distal PCA segments are
 patent.        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 No aneu
rysm.               Dural venous sinuses and proximal internal jugular veins are
 patent.                Please see the CT scan of the brain earlier today for no
nvascular       findings                         IMPRESSION:                   A
therosclerotic disease in the bilateral carotid bulbs without         significan
t foraminal narrowing.         Variant anatomy of the intracranial arteries with
out occlusion or         stenosis.                   Location: [facility]     PAGE  1  
                     Signed Report                    (CONTINUED)   Name: AMARILYS DAVIS                      [facility]                     : 1945 A
ge/S: 74  / M         [address]                Unit #: [id number]     Loc
:               [place]  [address]              Phys: Mandi Conway NP  
                                            Acct: [id number]  Dis Date:       
        Status: ADM IN                                  PHONE #: [phone]   
  Exam Date: 10/05/2020  1224                     FAX #: [phone]      Reaso
n: AMS, TIA                                            EXAMS:                   
                            CPT CODE:      [id number] CTA HEAD                   
                [address]               <Continued>                     ** 
Electronically Signed by Aleksander Pham MD on 10/05/2020 at 1419 **                
      Reported and signed by: Aleksander Pham MD                                    
 CC: Nikolas Mario MD; Mandi Conway NP                               
                                                             
Technologist:Driss Zamarripa RT(R),(MR),(CT)   CTDI:        DLP:        Trnscb 
Date/Time: 10/05/2020 (141) t.SDR.RR31                       Orig Print D/T: S:
 10/05/2020 (1422)      PAGE  2                       Signed Report             
                  URINALYSIS COMPLETE2020-10-05 13:09:00* 



             Test Item    Value        Reference Range Interpretation Comments

 

             UA COLOR (test code = COLU) Light-Yellow YELLOW                    

 

 

             UA APPEARANCE (test code = APPU) CLEAR        CLEAR                

      

 

             UA GLUCOSE DIPSTICK (test code = DGLUU) NEGATIVE mg/dL NEGATIVE    

               

 

             UA BILIRUBIN DIPSTICK (test code = BILU) NEGATIVE mg/dL NEGATIVE   

                

 

             UA KETONE DIPSTICK (test code = KETU) NEGATIVE mg/dL NEGATIVE      

             

 

             UA SPECIFIC GRAVITY (test code = SGU) 1.008        1.001-1.035     

           

 

             UA BLOOD DIPSTICK (test code = MARIE) Negative mg/dL NEGATIVE        

           

 

             UA PH DIPSTICK (test code = MARIEL) 7.0          5.0-8.0              

      

 

             UA PROTEIN DIPSTICK (test code = PROU) 10 (Trace) mg/dL NEGATIVE   

  A             

 

             UA UROBILINIOGEN DIPSTICK (test code = URO) Normal mg/dL NEGATIVE  

                 

 

             UA NITRITE DIPSTICK (test code = ANKITA) NEGATIVE     NEGATIVE       

            

 

             UA LEUKOCYTE ESTERASE W REFLEX (test code = LEUUR) NEGATIVE Elizabeth/uL 

NEGATIVE                   

 

             UA WBC (test code = WBCU)  per HPF     0-5                        

 

             UA RBC (test code = RBCU)  per HPF     0-5                        

 

             UA EPITHELIAL CELLS (test code = EPIU)  per HPF     Few            

            

 

             UA BACTERIA (test code = BACU)  per HPF     NONE                   

    





Urine Source? Clean CatchURINALYSIS COMPLETE2020-10-05 13:09:00* 



             Test Item    Value        Reference Range Interpretation Comments

 

             UA COLOR (test code = COLU) Light-Yellow YELLOW                    

 

 

             UA APPEARANCE (test code = APPU) CLEAR        CLEAR                

      

 

             UA GLUCOSE DIPSTICK (test code = DGLUU) NEGATIVE mg/dL NEGATIVE    

               

 

             UA BILIRUBIN DIPSTICK (test code = BILU) NEGATIVE mg/dL NEGATIVE   

                

 

             UA KETONE DIPSTICK (test code = KETU) NEGATIVE mg/dL NEGATIVE      

             

 

             UA SPECIFIC GRAVITY (test code = SGU) 1.008        1.001-1.035     

           

 

             UA BLOOD DIPSTICK (test code = MARIE) Negative mg/dL NEGATIVE        

           

 

             UA PH DIPSTICK (test code = MARIEL) 7.0          5.0-8.0              

      

 

             UA PROTEIN DIPSTICK (test code = PROU) 10 (Trace) mg/dL NEGATIVE   

  A             

 

             UA UROBILINIOGEN DIPSTICK (test code = URO) Normal mg/dL NEGATIVE  

                 

 

             UA NITRITE DIPSTICK (test code = ANKITA) NEGATIVE     NEGATIVE       

            

 

             UA LEUKOCYTE ESTERASE W REFLEX (test code = LEUUR) NEGATIVE Elizabeth/uL 

NEGATIVE                   

 

             UA WBC (test code = WBCU) 0-5 per HPF  0-5                        

 

             UA RBC (test code = RBCU) 0-3 #/HPF    0-5                        

 

             UA EPITHELIAL CELLS (test code = EPIU) FEW per HPF  FEW            

            

 

             UA BACTERIA (test code = BACU) FEW #/HPF    NONE                   

    





Urine Source? Clean CatchBASIC METABOLIC PANEL2020-10-05 04:27:00* 



             Test Item    Value        Reference Range Interpretation Comments

 

             SODIUM (test code = NA) 121 mmol/L   136-145      LL           Resu

lts called to TND8110 by 

V.LAB.GP 10/05/20 0427Critical results verified and read back by Nurse? Y

 

             POTASSIUM (test code = K) 4.0 mmol/L   3.5-5.1      N             

 

             CHLORIDE (test code = CL) 87.0 mmol/L         L             

 

             CARBON DIOXIDE (test code = CO2) 25.0 mmol/L  21-32        N       

      

 

             ANION GAP (test code = GAP) 13.0         10-20        N            

 

 

             GLUCOSE (test code = GLU) 105 mg/dL           N             

 

             BLOOD UREA NITROGEN (test code = BUN) 8 mg/dL      7-18         N  

           

 

             GLOMERULAR FILTRATION RATE (test code = GFR) > 60 mL/min  >=60     

                 Estimated GFR by

 using Modified MDRD formula.Chronic kidney disease is defined as either kidney 
damageor GFR <60 mL/min/1.73 m2 for >3 months.

 

             CREATININE (test code = CREAT) 0.70 mg/dL   0.7-1.3      N         

    

 

             BUN/CREATININE RATIO (test code = BUN/CREA) 11.5         10-20     

   N             

 

             CALCIUM (test code = CA) 8.4 mg/dL    8.5-10.1     L             





PROTHROMBIN [id number] 03:45:00* 



             Test Item    Value        Reference Range Interpretation Comments

 

             PROTHROMBIN TIME PATIENT (test code = PTP) 11.3 seconds 9.0-14.0   

  N             

 

             INTERNATIONAL NORMAL RATIO (test code = INR) 1.0          0.8-1.2  

    N            The therapeutic range

 for oral anticoagulant therapy formost indications is an international 
normalized ratio (INR)of between 2.0 and 3.0.  The recommended therapeutic 
INRrange for various clinical situations is listed 
below:_________________________________________________________Clinical 
Situation                          INR 
range_________________________________________________________ Pulmonary e
mbolism treatment              (2.0-3.0)Venous thrombosis treatmentVenous 
thrombosis prophylaxis (high risk surgery)Prevention of systemic embolism from: 
        Acute myocardial infarction         Valvular heart disease         
Atrial fibrillation Mechanical prosthetic heart valves          (2.5-3.5)





IS PATIENT ON ANTICOAGULANTS? NTHROMBOPLASTIN TIME PARTIAL2020-10-05 03:45:00* 



             Test Item    Value        Reference Range Interpretation Comments

 

             THROMBOPLASTIN TIME PARTIAL (test code = PTT) 27.4 seconds 23.0-37.

0    N             





IS PATIENT ON ANTICOAGULANTS? NCBC W/O DIFF2020-10-05 03:42:00* 



             Test Item    Value        Reference Range Interpretation Comments

 

             WHITE BLOOD CELL (test code = WBC) 5.5 K/mm3    4.5-12.5     N     

        

 

             RED BLOOD CELL (test code = RBC) 3.85 mill/mm3 4.0-5.8      L      

       

 

             HEMOGLOBIN (test code = HGB) 12.1 gram/dL 13.0-17.5    L           

  

 

             HEMATOCRIT (test code = HCT) 34.3 %       42.0-52.0    L           

  

 

             MEAN CELL VOLUME (test code = MCV) 89.1 fL      80-98        N     

        

 

             MEAN CELL HGB (test code = MCH) 31.4 picogram 27.0-33.0    N       

      

 

             MEAN CELL HGB CONCETRATION (test code = MCHC) 35.3 gram/dL 33.0-36.

0    N             

 

             RED CELL DISTRIBUTION WIDTH (test code = RDW) 12.2 %       11.6-16.

2    N             

 

             PLATELET COUNT (test code = PLT) 218 K/mm3    150-450      N       

      

 

             MEAN PLATELET VOLUME (test code = MPV) 9.0 fL       6.7-11.0     N 

            





- CT C-SPINE W/O CONTRAST2020-10-05 03:24:00  Name: AMARILYS AMADOR                
      [facility]                     : 1945 Age/S: 74  / M         
[address]                Unit #: [id number]     Loc:               
[place]  [address]              Phys: Jennifer Quiles MD                   
                            Acct: [id number]  Dis Date:               Status: 
REG ER                                  PHONE #: [phone]     Exam Date: 
10/05/2020  0312                     FAX #: [phone]      Reason: Neck Pain 
                                          EXAMS:                                
               CPT CODE:      [id number] CT C-SPINE W/O CONTRAST                 
   [address]                    AFTER HOURS SERVICE ON: 10/5/2020 3:23 AM           
    CT of the Cervical Spine Without Contrast               Location Code M12   
            History: Neck Pain               Technique:  Scans were obtained on 
a helical scanner pre IV contrast       only.                One or more of the 
following dose reduction techniques were used:       Automated exposure control,
 adjustment of the mA and/or kV according       to patient size, and/or 
utilization of iterative reconstruction       technique.                
Findings:               Craniocervical junction is intact.  Atlantoaxial joint 
is       unremarkable. C1 ring is normal. Dens is intact. Transverse processes, 
      pedicles and lamina are intact. No compression fracture or pathologic     
  lesions.  There is mild cervical spondylosis.  There is no significant       
central canal stenosis.  Multilevel mild to moderate bilateral       foraminal 
stenoses are seen due to uncovertebral hypertrophy.                 Impression: 
                  No cervical spine fracture.                 ** Electronically 
Signed by Pro Marin M.D. **         **               on 10/05/2020 at 
0324               **                      Reported and signed by: Pro Marin M.D.          CC: Jennifer Quiles MD                                
                                                                                
   Technologist:RT BRIAN            CTDI:        DLP:        Trnscb 
Date/Time: 10/05/2020 (324) LowMA50                       Orig Print D/T: S:
 10/05/2020 (0327)      PAGE  1                       Signed Report             
                  - CT HEAD/BRAIN W/O CONT2020-10-05 03:20:00  Name: 
AMARILYS AMADOR                      [facility]                     : 
1945 Age/S: 74  / M         [address]                Unit #: [id number]
[id number]     Loc:               [place]  [address]              Phys: Jennifer Quiles MD                                               Acct: [id number]  Di
s Date:               Status: REG ER                                  PHONE #: [phone]
[phone]     Exam Date: 10/05/2020  0312                     FAX #: [phone]
[phone]      Reason: HEADACHE                                            EXAMS:     
                                          CPT CODE:      [id number] CT HEAD/BRAIN
 W/O CONT                     [address]                    AFTER HOURS SERVICE ON:  3:19 AM               CT Scan of the Brain Without Contrast            
   Location Code M12               History: HEADACHE               Technique:  S
cans were performed on a helical scanner pre IV contrast       only. The study i
s limited secondary to lack of intravenous contrast,       particularly for eval
uation of masses.                One or more of the following dose reduction richa
hniques were used:       Automated exposure control, adjustment of the mA and/or
 kV according       to patient size, and/or utilization of iterative reconstruct
ion       technique.               Findings:                There is no hydrocep
halus. Basal cisterns are patent. There is no       intracranial hyperdense hemo
rrhage. There is no midline shift or mass       effect. No effacement of the gra
y-white matter junction to indicate       acute infarction. There are chronic is
chemic white matter changes.        There is a posterior scalp laceration.  Ther
e is no skull fracture.                 Impression:                   Posterior 
scalp laceration.  No skull fracture or intracranial         hemorrhage.        
         ** Electronically Signed by Pro Marin M.D. **         **     
          on 10/05/2020 at 0320               **                      Reported a
nd signed by: Pro Marin M.D.         CC: Jennifer Quiles MD        
                                                                                
                           Technologist:RT BRIAN            CTDI:   
     DLP:        Trnscb Date/Time: 10/05/2020 (0320) t.SDR.MA50                 
      Orig Print D/T: S: 10/05/2020 (0323)      PAGE  1                       Si
gned Report                               Serum or plasma sodium measurement 
(moles/volume)2020 04:35:00* 



             Test Item    Value        Reference Range Interpretation Comments

 

             Sodium Level (test code = 2951-2) 132          136-145             

       





[facility] potassium [facility] 
(moles/volume)2020 04:35:00* 



             Test Item    Value        Reference Range Interpretation Comments

 

             Potassium Level (test code = 2823-3) 3.7          3.5-5.1          

          





[facility]erum or plasma chloride measurement 
(moles/volume)2020 04:35:00* 



             Test Item    Value        Reference Range Interpretation Comments

 

             Chloride Level (test code = 2075-0) 98                       

         





[facility]um or plasma carbon dioxide, total 
measurement (moles/volume)2020 04:35:00* 



             Test Item    Value        Reference Range Interpretation Comments

 

             Carbon Dioxide Level (test code = -9) 26           22-29       

               





[facility]erum or plasma anion [id number] 
04:35:00* 



             Test Item    Value        Reference Range Interpretation Comments

 

             Anion Gap (test code = 33037-3) 11.7         8-16                  

     





[facility]erum or plasma urea nitrogen measurement
 (mass/volume)2020 04:35:00* 



             Test Item    Value        Reference Range Interpretation Comments

 

             Blood Urea Nitrogen (test code = 3094-0) 11           7-26         

              





[facility] or plasma creatinine measurement 
(mass/volume)2020 04:35:00* 



             Test Item    Value        Reference Range Interpretation Comments

 

             Creatinine (test code = 2160-0) 0.70         0.72-1.25             

     





[facility]erum or plasma urea nitrogen/creatinine 
mass [id number] 04:35:00* 



             Test Item    Value        Reference Range Interpretation Comments

 

             BUN/Creatinine Ratio (test code = 3097-3) 16           6-25        

               





[facility] CenterEstimated glomerular filtration rate 
(GFR) oquzonllxbrdc6158-75-60 04:35:00* 



             Test Item    Value        Reference Range Interpretation Comments

 

             Estimat Glomerular Filtration Rate (test code = 615931716) > 60    

     >60                        





Ranges were taken from the National Kidney Disease Education Program and the Thi
[facility] literature.Reference ranges:60 or greater: Jveqyj63-47 (
for 3 consecutive months): Chronic kidney disease 15 or less: Kidney failureMethod[facility] Medical CenterGlucose dzbmfdzyhli6894-71-70 04:35:00* 



             Test Item    Value        Reference Range Interpretation Comments

 

             Glucose Level (test code = FJF5298) 92                       

         





[facility]um or plasma calcium measurement 
(mass/volume)2020 04:35:00* 



             Test Item    Value        Reference Range Interpretation Comments

 

             Calcium Level (test code = 60259-2) 8.8          8.4-10.2          

         





[facility] CenterBlood cobalamin (vitamin B12) measurement
 (mass/volume)2020 12:05:00* 



             Test Item    Value        Reference Range Interpretation Comments

 

             Vitamin B12 Level (test code = 19945-2) 1000         213-816       

             





[facility] CenterBlood leukocytes automated count 
(number/volume)2020 04:30:00* 



             Test Item    Value        Reference Range Interpretation Comments

 

             White Blood Count (test code = 6690-2) 4.84         4.8-10.8       

            





[facility] CenterBlood erythrocytes automated count 
(number/volume)2020 04:30:00* 



             Test Item    Value        Reference Range Interpretation Comments

 

             Red Blood Count (test code = 789-8) 3.89         4.3-5.7           

         





[facility] CenterBlood hemoglobin measurement 
(moles/volume)2020 04:30:00* 



             Test Item    Value        Reference Range Interpretation Comments

 

             Hemoglobin (test code = 47739-3) 12.7         14.0-18.0            

      





[facility] CenterAutomated blood hematocrit (volume 
fraction)2020 04:30:00* 



             Test Item    Value        Reference Range Interpretation Comments

 

             Hematocrit (test code = 4544-3) 35.9         38.2-49.6             

     





[facility] CenterAutomated erythrocyte mean corpuscular 
nxxrga5577-01-68 04:30:00* 



             Test Item    Value        Reference Range Interpretation Comments

 

             Mean Corpuscular Volume (test code = 787-2) 92.3         81-99     

                 





[facility] CenterAutomated erythrocyte mean corpuscular 
hemoglobin (mass per erythrocyte)2020 04:30:00* 



             Test Item    Value        Reference Range Interpretation Comments

 

             Mean Corpuscular Hemoglobin (test code = 785-6) 32.6         28-32 

                     





[facility] CenterAutomated erythrocyte mean corpuscular 
hemoglobin concentration measurement (mass/volume)2020 04:30:00* 



             Test Item    Value        Reference Range Interpretation Comments

 

             Mean Corpuscular Hemoglobin Concent (test code = 786-4) 35.4       

  31-35                      





[facility] CenterRDW QggVg-Trs6394-60-27 04:30:00* 



             Test Item    Value        Reference Range Interpretation Comments

 

             Red Cell Distribution Width (test code = 01753-2) 12.8         11.7

-14.4                  





[facility] CenterAutomated blood platelet count 
(count/volume)2020 04:30:00* 



             Test Item    Value        Reference Range Interpretation Comments

 

             Platelet Count (test code = 777-3) 230          140-360            

        





[facility] CenterAutomated blood segmented neutrophil 
count as percentage of total ffsuarssec1080-30-39 04:30:00* 



             Test Item    Value        Reference Range Interpretation Comments

 

             Neutrophils (%) (Auto) (test code = 01114-4) 83.3         38.7-80.0

                  





[facility] CenterAutomated blood lymphocyte count as 
percentage ot total [id number] 04:30:00* 



             Test Item    Value        Reference Range Interpretation Comments

 

             Lymphocytes (%) (Auto) (test code = 736-9) 12.6         18.0-39.1  

                





[facility] CenterAutomated blood monocyte count as 
percentage of total alsfcyqaol4139-51-10 04:30:00* 



             Test Item    Value        Reference Range Interpretation Comments

 

             Monocytes (%) (Auto) (test code = 5905-5) 3.5          4.4-11.3    

               





[facility] CenterAutomated blood eosinophil count as 
percentage of total crftsxolcr3107-36-67 04:30:00* 



             Test Item    Value        Reference Range Interpretation Comments

 

             Eosinophils (%) (Auto) (test code = 713-8) 0.0          0.0-6.0    

                





[facility] CenterAutomated blood basophil count as 
percentage of total kisuwkpbel3519-76-01 04:30:00* 



             Test Item    Value        Reference Range Interpretation Comments

 

             Basophils (%) (Auto) (test code = 706-2) 0.4          0.0-1.0      

              





[facility] CenterFluoroscopic procedure less than one hour
 fkgkxcgh8465-20-45 04:30:00* 



             Test Item    Value        Reference Range Interpretation Comments

 

             IM GRANULOCYTES % (test code = IM GRANULOCYTES %) 0.2          0.0-

1.0                    





[facility] CenterAutomated blood neutrophil count
2020 04:30:00* 



             Test Item    Value        Reference Range Interpretation Comments

 

             Neutrophils # (Auto) (test code = 751-8) 4.0          2.1-6.9      

              





[facility] CenterBlood lymphocytes count (number/volume)
2020 04:30:00* 



             Test Item    Value        Reference Range Interpretation Comments

 

             Lymphocytes # (Auto) (test code = 83881-2) 0.6          1.0-3.2    

                





[facility] CenterBlood monocytes automated count 
(number/volume)2020 04:30:00* 



             Test Item    Value        Reference Range Interpretation Comments

 

             Monocytes # (Auto) (test code = 742-7) 0.2          0.2-0.8        

            





[facility] CenterAutomated blood eosinophil count
2020 04:30:00* 



             Test Item    Value        Reference Range Interpretation Comments

 

             Eosinophils # (Auto) (test code = 711-2) 0.0          0.0-0.4      

              





[facility] CenterAutomated blood basophil count 
(count/volume)2020 04:30:00* 



             Test Item    Value        Reference Range Interpretation Comments

 

             Basophils # (Auto) (test code = 704-7) 0.0          0.0-0.1        

            





[facility] CenterFluoroscopic procedure less than one hour
 tpcyggpp9455-72-97 04:30:00* 



             Test Item    Value        Reference Range Interpretation Comments

 

                                        Absolute Immature Granulocyte (auto (linus

t code = Absolute Immature Granulocyte 

(auto)          0.01            0-0.1                            





[facility] CenterFree thyroxine gptsp8265-16-46 15:33:00* 



             Test Item    Value        Reference Range Interpretation Comments

 

             Free Thyroxine Index (test code = 47823-5) 1.7866       1.4-3.8    

                





[facility] thyrox[facility] (T4) 
measurement (mass/volume)2020 15:33:00* 



             Test Item    Value        Reference Range Interpretation Comments

 

             Thyroxine (T4) (test code = 3026-2) 6.18         4.5-10.9          

         





[facility] plasma triiodothyronine resin 
uptake (T3RU)2020 15:33:00* 



             Test Item    Value        Reference Range Interpretation Comments

 

             Triiodothyronine (T3) Uptake (test code = 3050-2) 28.91        22.5

-37.0                  





[facility] plasma thyrotropin measurement 
by detection limit <= 0.005 miu/l (units/volume)2020 15:33:00* 



             Test Item    Value        Reference Range Interpretation Comments

 

             Thyroid Stimulating Hormone (TSH) (test code = 22292-0) 0.399      

  0.350-4.940                





[facility] nuclear antibody titer by 
qexeuyzhvnooovaqps6359-85-16 15:33:00* 



             Test Item    Value        Reference Range Interpretation Comments

 

             Anti-Nuclear Antibody Screen (test code = 5048-4) Negative     .   

                       





                                     Negative   <1:80                           
          Borderline  1:80                                     Positive   >
1:80Performed at:   - [facility] [address]  [id number]
3Lab Director: Wolf Aragon MD, Phone:  [phone] [facility] Medical CenterCT BRAIN [id number] 11:44:00                                           
                                           [facility]    
                    [address] [address]   
   Patient Name: AMARILYS AMADOR                                MR #: [id number] 
                 : 1945                                   Age/Sex: 74/M
  Acct #: [id number]                              Req #: [id number]  Adm 
Physician: ELEAZAR PURI MD                                        Ordered by: 
ELEAZAR PURI MD                         Report #: [id number]        Location: [facility]
                                    Room/Bed: [address] [address]        
______________________________________________________________
_____________________________________    Procedure: [id number] CT/CT BRAIN  Date: 20                            Exam Time: 1115                    
                          REPORT STATUS: Signed    Exam: Head CT without contras
t   History:  Weakness, hyponatremia   Comparison studies:  Head CT 2020   
   Technique:   Axial images were obtained from the skull base to the vertex.   
Coronal and sagittal images reconstructed from the axial data.  Dose   modulatio
n, iterative reconstruction, and/or weight based adjustment of the   mA/kV was u
tilized to reduce the radiation dose to as low as reasonably   achievable.      
  Radiation dose:    Total DLP: 921.4 mGy*cm.    Estimated effective dose: DLP x
 0.015    Intravenous contrast: None      Findings:      Scalp: No abnormalities
.   Bones: No fractures, blastic or lytic lesions.      Brain sulci: Mild from. 
  Ventricles: Mild compensatory dilatation. No hydrocephalus.      Extra-axial s
paces: Prominent extra-axial space similar to CSF density within   the prepontin
e and perimedullary cisterns may be due to an arachnoid cyst or   possibly secon
michael to ectatic right vertebral and basilar artery.      Parenchyma:    Ill-defi
balwinder and confluent hypodensities in the supratentorial white matter are   nonspec
ific but are most compatible with chronic microvascular ischemic   changes. Unch
anged chronic lacunar infarct in the posterior limb of the left   internal capsu
le.      Sellar/suprasellar region: No abnormalities.   Craniocervical junction:
 Patent foramen magnum. No Chiari one malformation.      Additional findings:   
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
erosclerosis.      IMPRESSION:       No acute intracranial abnormalities.   No c
hanges from the prior head CT of 2020.      Chronic findings:   1.  Mild ge
neralized parenchymal volume loss.   2.  Chronic left thalamic lacunar infarct. 
  3.  Moderate microvascular ischemic changes.      Signed by: Dr. Raymond Jacinto M.D. on 2020 11:50 AM        Dictated By: RAYMOND JACINTO MD  Electronic[employer]
lly Signed By: RAYMOND JACINTO MD on 20 1150  Transcribed By: ZELDA on 0
20 1150       COPY TO:   ELEAZAR PURI MD         Urine sodium measurement 
(moles/volume)2020 08:09:00* 



             Test Item    Value        Reference Range Interpretation Comments

 

             Urine Random Sodium (test code = 2955-3) 97                        

              





[facility] CenterCT CHEST  13:27:00           
                                                                           [facility]                        [address] [place] [address]      Patient Name: AMARILYS AMADOR           
                     MR #: [id number]                  : 1945          
                         Age/Sex: 74/M  Acct #: [id number]                    
          Req #: [id number]  Adm Physician: ELEAZAR PURI MD                       
                 Ordered by: ELEAZAR PURI MD                         Report #: 
[id number]        Location: [facility]                                    Room/Bed: 
[address] [address]        ______________________________________________________________
_____________________________________    Procedure: [id number] CT/CT CHEST WO  Ex
am Date: 20                            Exam Time: 1310                    
                          REPORT STATUS: Signed    EXAM: CT Chest WITHOUT intrav
enous contrast 2020 1:07 PM   INDICATION:     sob   COMPARISON: X-ray dated
 2020   TECHNIQUE:   Chest was scanned utilizing a multidetector helical sc
ainsley from the lung apex   through the level of the adrenal glands without admin
istration of IV contrast.   Coronal and sagittal reformations were obtained. Rou
glenn protocol was   performed.      IV CONTRAST: None   RADIATION DOSE: Total DL
P: 421 mGy*cm. Dose modulation, iterative   reconstruction, and/or weight based 
adjustment of the mA/kV was utilized to   reduce the radiation dose to as low as
 reasonably achievable.    COMPLICATIONS: None      FINDINGS:      LINES/ TUBES:
 None.      LUNGS AND AIRWAYS:  Large airways are clear. There is lower lobe pre
dominant   peripheral interstitial scarring. Honeycombing is noted at the lung b
ases with   traction bronchiectasis. Interlobular septal thickening is noted.   
   PLEURA: The pleural spaces are clear. Pleural thickening is noted at the lung
   bases.      HEART AND MEDIASTINUM: The thyroid gland is normal.  No mediastin
al, hilar or   axillary lymphadenopathy. Multiple nonenlarged mediastinal lymph 
nodes are   noted. The heart is normal in size. There is no pericardial effusion
.    Scattered coronary artery calcifications are noted. After scarring   calcif
ications of the aortic arch are noted.      UPPER ABDOMEN: Unremarkable.      BAYLEE
URSULA: Negative for acute osseous abnormality. Osseous structures are mildly   dem
ineralized. Mild multilevel degenerative changes are noted. No suspicious   lyti
c or blastic lesion is identified.      SOFT TISSUES: Unremarkable.      IMPRESS
ION:    Constellation of findings concerning for chronic interstitial lung disea
se.    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
sis.   Although no definite suspicious infectious infiltrate is identified lack 
of   comparison imaging does limit evaluation.   Consider short-term follow-up i
n 3-6 months to evaluate for stability.      Signed by: Trey Jorge MD on 2020 1:35 PM        Dictated By: TREY JORGE MD  Electronically Signed By: LOLITA JORGE MD on 20  Transcribed By: ZELDA on 20 1335       C
OPY TO:   ELEAZAR PURI MD         Serum or plasma creatine kinase measurement 
(enzymatic activity/volume)2020 12:35:00* 



             Test Item    Value        Reference Range Interpretation Comments

 

             Creatine Kinase (test code = 2157-6) 195                     

          





[facility] creatine kinase MB 
measurement (mass/volume)2020 12:35:00* 



             Test Item    Value        Reference Range Interpretation Comments

 

             Creatine Kinase MB (test code = 48473-4) 4.10         0-5.0        

              





[facility] CenterTroponin I measurement by highly 
sensitive enzyme pxwluziwbux4770-45-68 12:35:00* 



             Test Item    Value        Reference Range Interpretation Comments

 

             Troponin I (test code = 88597-5) 0.005        0-0.300              

      





[facility] CenterOsmolality of Serum or Plasma by 
dpozxkvgyqf8245-21-90 04:15:00* 



             Test Item    Value        Reference Range Interpretation Comments

 

             Serum Osmolality (test code = 08440-0) 251          278-305        

            





[facility] total bilirubin 
measurement (mass/volume)2020 04:15:00* 



             Test Item    Value        Reference Range Interpretation Comments

 

             Total Bilirubin (test code = 1975-2) 0.4          0.2-1.2          

          





[facility] CenterFluoroscopic procedure less than one hour
 bvcquusi8556-72-23 04:15:00* 



             Test Item    Value        Reference Range Interpretation Comments

 

                                        Aspartate Amino Transf (AST/SGOT) (test 

code = Aspartate Amino Transf 

(AST/SGOT))     16              5-34                             





[facility] plasma alanine aminotransferase 
measurement (enzymatic activity/volume)2020 04:15:00* 



             Test Item    Value        Reference Range Interpretation Comments

 

             Alanine Aminotransferase (ALT/SGPT) (test code = 1742-6) 7         

   0-55                       





[facility] protein measurement 
(mass/volume)2020 04:15:00* 



             Test Item    Value        Reference Range Interpretation Comments

 

             Total Protein (test code = 2885-2) 6.1          6.5-8.1            

        





[facility] albumin measurement 
(mass/volume)2020 04:15:00* 



             Test Item    Value        Reference Range Interpretation Comments

 

             Albumin (test code = 1751-7) 3.7          3.5-5.0                  

  





[facility] CenterPlasma globulin measurement (mass/volume)
2020 04:15:00* 



             Test Item    Value        Reference Range Interpretation Comments

 

             Globulin (test code = 76472-9) 2.4          2.3-3.5                

    





[facility] albumin/globulin mass 
zktvz1281-38-82 04:15:00* 



             Test Item    Value        Reference Range Interpretation Comments

 

             Albumin/Globulin Ratio (test code = 1759-0) 1.5          0.8-2.0   

                 





[facility] alkaline phosphatase 
measurement (enzymatic activity/volume)2020 04:15:00* 



             Test Item    Value        Reference Range Interpretation Comments

 

             Alkaline Phosphatase (test code = 6768-6) 57                 

               





[facility] CenterCHEST 2 [id number] 20:52:00         
                                                                             [facility]                        [address] [place] [address]      Patient Name: AMARILYS AMADOR           
                     MR #: [id number]                  : 1945          
                         Age/Sex: 74/M  Acct #: [id number]                    
          Req #: [id number]  Adm Physician: ELEAZAR PURI MD                       
                 Ordered by: SENTHIL PURI DO                         Report #: 
[id number]        Location: [facility]                                  Room/Bed: 
[address] [address]          ____________________________________________________________
_______________________________________    Procedure: [id number] DX/CHEST 2 VIEWS
  Exam Date: 20                            Exam Time: 1940                
                              REPORT STATUS: Signed    EXAMINATION:  CHEST 2 VIE
WS          INDICATION: Assess post fall.      COMPARISON:  None           FINDI
NGS:        TUBES and LINES:  None.      LUNGS: Low lung volumes with multifocal
 interstitial and patchy airspace   opacities.      PLEURA:  No pleural effusion
 or pneumothorax.      HEART AND MEDIASTINUM:  The cardiomediastinal silhouette 
is enlarged.      BONES AND SOFT TISSUES:  No acute osseous lesion.  Soft tissue
s are   unremarkable.      UPPER ABDOMEN: No free air under the diaphragm.      
    IMPRESSION:      1.  Patchy airspace opacities throughout both lungs which m
ost likely   represents multifocal pneumonia.    2.  Cardiomegaly with likely jones
perimposed pulmonary edema.      Signed by: Carmen Bettencourt MD on 2020 8:54 P
M        Dictated By: CARMEN BETTENCOURT MD  Electronically Signed By: CARMEN HAIR MD on 20  Transcribed By: ZELDA on 20       COPY TO:  
 SENTHIL PURI DO         Fluoroscopic procedure less than one hour duration
2020 20:50:00* 



             Test Item    Value        Reference Range Interpretation Comments

 

             Coronavirus (PCR) (test code = Coronavirus (PCR)) NOT DETECTED NOTD

ETECTED                





SARS-CoV-2 PCRHologic Aptima SARS-CoV-2 assay is a nucleic amplification test in
tended for the qualitative detection of RNA from SARS-CoV-2 from nasopharyngeal 
(NP) specimens. It is used under Emergency Use Authorization (EUA) by FDA.A posi
tive result is indicative of the presence of SARS-CoV-2 RNA. Clinical correlatio
n with patient history and other diagnostic information is necessary to determin
e patient infection status.A negative (Not Detected) result does not preclude SA
RS-CoV-2 infection. Clinical Correlation with patient history and other diagnost
ic information should be used in patient management decisions.Invalid: Unable to
 generate a valid result on this specimen. Please submit a new specimen for repr
at testing oc clinically indicated.Tesing performed by:[facility]3
 [facility] [id number], Britany rodriguez MD, PhDMemorial Hermann–[place] Medical Center THREE VIEWS BILATERAL
2020 20:50:00                                                             
                         [facility]                      
  [address] [place], [place] [address]      Patient Name: 
AMARILYS AMADOR                                MR #: [id number]                  
: 1945                                   Age/Sex: 74/M  Acct #: 
[id number]                              Req #: [id number]  Adm Physician: 
ELEAZAR PURI MD                                        Ordered by: SENTHIL PURI DO                         Report #: [id number]        Location: [facility]          
                        Room/Bed: [address] [address]          
____________________________________________________________
_______________________________________    Procedure: [id number] DX/KNEE THREE VI
EWS BILATERAL  Exam Date: 20                            Exam Time:    
                                           REPORT STATUS: Signed    Bilateral kn
ee x-rays - 3 view(s) each      HISTORY:  Pain.       COMPARISON: None available
.           FINDINGS:   RIGHT:   No displaced fracture.     The osseous alignmen
t is within normal limits.   The joint spaces are well-maintained.   The soft ti
ssues appear unremarkable.      LEFT:   No displaced fracture.     The osseous a
lignment is within normal limits.   The joint spaces are well-maintained.   The 
soft tissues appear unremarkable.         IMPRESSION:    No acute radiographic a
bnormality.      Signed by: Carmen Bettencourt MD on 2020 8:52 PM        Dictate
d By: CARMEN BETTENCOURT MD  Electronically Signed By: CARMEN BETTENCOURT MD on 20  Transcribed By: ZELDA on 20       COPY TO:   SENTHIL PURI DO
         HAND 3+ VIEWS [id number] 20:48:00                                 
                                                     [facility]                        [address] [place], [place] [address]      Patient Name: AMARILYS AMADOR                                MR 
#: [id number]                  : 1945                                  
 Age/Sex: 74/M  Acct #: [id number]                              Req #: [id number]
[id number]  Adm Physician: ELEAZAR PURI MD                                        
Ordered by: SENTHIL PURI DO                         Report #: [id number]        
[facility]                                  [facility]-4          
____________________________________________________________
_______________________________________    Procedure:  DX/HAND 3+ VIEWS
 RIGHT  Exam Date: 20                            Exam Time:           
                                    REPORT STATUS: Signed    X-ray 3 views of th
e hand.      HISTORY:  Pain.       COMPARISON: None available.           FINDING
S:   Bones:   No acute displaced fracture.     Osseous alignment is within todd
l limits.      Joints:   The joint spaces are well-maintained. There are degener
ative changes of   multiple interphalangeal joints.      Soft tissues:   Severe 
vascular sclerotic calcification.      IMPRESSION:       No acute fracture or di
slocation. No focal soft tissue abnormality.      Signed by: Carmen Bettencourt MD on
 2020 8:50 PM        Dictated By: CARMEN BETTENCOURT MD  Electronically Signed 
By: CARMEN BETTENCOURT MD on 20  Transcribed By: ZELDA on 20
       COPY TO:   SENTHIL PURI DO         CT CERVICAL SPINE [id number] 
20:31:00                                                                        
              [place] [address]      Patient Name: AMARILYS AMADOR                                MR #: [id number]                  : 
1945                                   Age/Sex: 74/M  Acct #: [id number]
                              Req #: [id number]  Adm Physician:                 
                                     Ordered by: SENTHIL PURI DO               
          Report #: [id number]        Location: ER                               
       Room/Bed:                   
____________________________________________________________
_______________________________________    Procedure: [id number] CT/CT CERVICAL JEANIE ERIC  Exam Date: 20                            Exam Time:          
                                     REPORT STATUS: Signed    History: Trauma   
Comparison studies: None      Technique:    Axial images were obtained through t
he cervical region..   Coronal and sagittal images reconstructed from the axial 
data.   Dose modulation, iterative reconstruction, and/or weight based adjustmen
t    of the mA/kV was utilized to reduce the radiation dose to as low as reasona
ponce    achievable.      Intravenous contrast: None      Findings:      Fractures
: None.   Soft tissues: No gross abnormalities.      Atlantoaxial articulation: 
Intact.   Alignment: Straightening of the usual lordosis is probably positional.
 No   scoliosis.   Cervicomedullary junction: No abnormalities. The foramen magn
um is patent.      Vertebrae:    No infection or neoplasm.      Degenerative angie
nges:    Moderate at the atlantoaxial articulation.   No significant degenerativ
e changes in the discs.   Mild foraminal stenosis on the right at C4-5 due to un
coarthrosis.   Bilateral facet arthrosis at C7-T1. Patent spinal canal.      Inc
idental findings:   Focal calcification of the ligamentum nuchae at C5-6.   Athe
rosclerotic calcifications (aortic arch, subclavian and brachiocephalic   arteri
es and at the carotid bulbs).      IMPRESSION:      1.  No acute abnormalities. 
     2.  Specifically, no fractures or subluxations.      3.  Cannot adequately 
evaluate for ligament, spinal cord and or vascular   abnormalities.      Signed 
by: Dr. Stanton Jose M.D. on 2020 8:36 PM        Dictated By: STANTON JOSE MD, MD  Electronically Signed By: STANTON JOSE MD, MD on 2036  Transcribed By: ZELDA on 20       COPY TO:   NIKITA PURI DO         CT MAXIO FAC/DI [id number] 20:27:00                       
                                                               [place] [address]      Patient Name: AMARILYS AMADOR           
                     MR #: [id number]                  : 1945          
                         Age/Sex: 74/M  Acct #: [id number]                    
          Req #: [id number]  Adm Physician:                                     
                 Ordered by: SENTHIL PURI DO                         Report #: 
[id number]        Location: ER                                      Room/Bed:    
               ____________________________________________________________
_______________________________________    Procedure: [id number] CT/CT MAXIO FAC/
PARANAS WO  Exam Date: 20                            Exam Time:       
                                        REPORT STATUS: Signed    History:Fall   
Comparison studies: None      Technique:   Axial images were obtained through 
e maxillofacial region.   Coronal and sagittal images reconstructed from the axi
al data.   Dose modulation, iterative reconstruction, and/or weight based adjust
ment    of the mA/kV was utilized to reduce the radiation dose to as low as reas
onably    achievable.      Intravenous contrast: None      Findings:      Soft t
issues:    No abnormalities.      Bones:    No fractures or bone abnormalities. 
     Orbits:    Globes: Intact   Extra or intraconal abnormalities: None.      P
aranasal sinuses:    Clear      IMPRESSION:       1.  No maxillofacial abnormali
ties.   2.  Specifically, no fractures      Signed by: KENDELL Goss on 2020 8:30 PM        Dictated By: STANTON JOSE MD, MD  Electro
nically Signed By: STANTON JOSE MD, MD on 20  Transcribed By: 
ZELDA on 20       COPY TO:   SENTHIL PURI DO         CT BRAIN WO
2020 20:25:00                                                             
                         [place] [address]      Patient Name: 
AMARILYS AMADOR                                MR #: [id number]                  
: 1945                                   Age/Sex: 74/M  Acct #: 
[id number]                              Req #: [id number]  Adm Physician:     
                                                 Ordered by: SENTHIL PURI DO   
                      Report #: [id number]        Location: ER                   
                   Room/Bed:                   
____________________________________________________________
_______________________________________    Procedure: [id number] CT/CT BRAIN WO  
Exam Date: 20                            Exam Time: 0                  
                            REPORT STATUS: Signed       ******** ADDENDUM #1 ***
*****      Incidental 1.4 cm widening of the prepontine cistern (series 400, nitin
ge 32) is   probably due an incidental arachnoid cyst.      Signed by: Dr. Stanton Jose M.D. on 2020 8:37 PM   ******** ORIGINAL REPORT ********    
  History:Fall      Comparison studies:   None      Technique:   Axial images we
re obtained from the skull base to the vertex.   Coronal and sagittal images rec
onstructed from the axial data.   Dose modulation, iterative reconstruction, and
/or weight based adjustment    of the mA/kV was utilized to reduce the radiation
 dose to as low as reasonably    achievable.      Intravenous contrast: None    
  Findings:      Scalp/skull:    No abnormalities.      Extra-axial spaces:    N
o masses.  No fluid collections.      Brain sulci: Mildly prominent.   Ventricle
s: Mild compensatory dilatation. No hydrocephalus.      Parenchyma:    Diffuse, 
confluent hypodensities in the supratentorial white matter are small   vessel is
chemic changes.    No masses, hemorrhage, acute or chronic cortical vascular ins
ults.      Sellar/suprasellar region: No abnormalities.   Craniocervical junctio
n: Patent foramen magnum.  No Chiari one malformation.      Incidental findings:
   Tortuous and ectatic and partially calcified right vertebral and basilar   ar
teries.   Coarse calcifications in the left vertebral artery, carotid siphons an
d M1   segment of the left MCA.      Impression:      No acute intracranial abno
rmalities.      Chronic findings:   1.  Mild generalized volume loss.   2.  Diff
use supratentorial white matter small vessel ischemic changes.      Signed by: MARY GRACE Jose M.D. on 2020 8:27 PM        Dictated By: STANTON KENDRICK MD, MD  Electronically Signed By: STANTON JOSE MD, MD on 2037  Transcribed By: ZELDA on 20       COPY TO:   SENTHIL PURI         BNP [id number] 19:32:00* 



             Test Item    Value        Reference Range Interpretation Comments

 

             B-Type Natriuretic Peptide (test code = 64357-6) 61.9         0-100

                      





[facility] ethanol [facility] 
(mass/volume)2020 19:32:00* 



             Test Item    Value        Reference Range Interpretation Comments

 

             Ethyl Alcohol Level (test code = 5643-2) < 10.0       0.0-10.0     

              





[facility] ogkfphfdmsika8895-03-88 
19:30:00* 



             Test Item    Value        Reference Range Interpretation Comments

 

             Urine Color (test code = 5778-6) YELLOW       YELLOW               

      





[facility] exuhnkq7401-17-38 19:30:00* 



             Test Item    Value        Reference Range Interpretation Comments

 

             Urine Clarity (test code = 25278-5) SL CLOUDY    CLEAR             

         





[facility] Urine by Test strip
2020 19:30:00* 



             Test Item    Value        Reference Range Interpretation Comments

 

             Urine Specific Gravity (test code = 5811-5) 1.025        1.010-1.02

5                





[facility] pH [facility] by automated test 
aqnfy0914-92-98 19:30:00* 



             Test Item    Value        Reference Range Interpretation Comments

 

             Urine pH (test code = 15433-1) 7            5-7                    

    





[facility] leukocyte esterase detection by 
utigdtiv1789-16-14 19:30:00* 



             Test Item    Value        Reference Range Interpretation Comments

 

             Urine Leukocyte Esterase (test code = 5799-2) NEGATIVE     NEGATIVE

                   





[facility] nitrite ajneykfiu3403-39-89 
19:30:00* 



             Test Item    Value        Reference Range Interpretation Comments

 

             Urine Nitrite (test code = 61208-2) NEGATIVE     NEGATIVE          

         





[facility] protein measurement by test strip 
(mass/volume)2020 19:30:00* 



             Test Item    Value        Reference Range Interpretation Comments

 

             Urine Protein (test code = 5804-0) >=300        NEGATIVE           

        





[facility] glucose gkgydvzdc6260-80-47 
19:30:00* 



             Test Item    Value        Reference Range Interpretation Comments

 

             Urine Glucose (UA) (test code = 2349-9) NEGATIVE     NEGATIVE      

             





[facility] ketones detection by automated test
 ikwyr5034-60-07 19:30:00* 



             Test Item    Value        Reference Range Interpretation Comments

 

             Urine Ketones (test code = 93338-0) NEGATIVE     NEGATIVE          

         





[facility] urobilinogen measurement by test 
strip (mass/volume)2020 19:30:00* 



             Test Item    Value        Reference Range Interpretation Comments

 

             Urine Urobilinogen (test code = 51618-3) 0.2          0.2-1        

              





[facility] total bilirubin measurement 
(mass/volume)2020 19:30:00* 



             Test Item    Value        Reference Range Interpretation Comments

 

             Urine Bilirubin (test code = 1978-6) NEGATIVE     NEGATIVE         

          





[facility] erythrocytes hsmyzeiwi2810-65-87 
19:30:00* 



             Test Item    Value        Reference Range Interpretation Comments

 

             Urine Blood (test code = 26198-3) NEGATIVE     NEGATIVE            

       





[facility] CenterAutomated urine sediment leukocyte count 
by microscopy (number/high power field)2020 19:30:00* 



             Test Item    Value        Reference Range Interpretation Comments

 

             Urine WBC (test code = 5821-4) 0-5          0-5                    

    





[facility] CenterErythrocytes detection in urine sediment 
by light hlyygxxhqk7268-90-36 19:30:00* 



             Test Item    Value        Reference Range Interpretation Comments

 

             Urine RBC (test code = 33893-8) NONE         0-5                   

     





[facility] CenterBacteria detection in urine sediment by 
light wsmonhzkzh3402-62-49 19:30:00* 



             Test Item    Value        Reference Range Interpretation Comments

 

             Urine Bacteria (test code = 42569-4) FEW          NONE             

          





[facility] CenterEpithelial cells detection in urine 
sediment by light ubisvhcmtv7101-13-57 19:30:00* 



             Test Item    Value        Reference Range Interpretation Comments

 

             Urine Epithelial Cells (test code = 56217-6) NONE         NONE     

                  





[facility] CenterOsmolality of Dyapl3715-89-37 19:30:00* 



             Test Item    Value        Reference Range Interpretation Comments

 

             Urine Osmolality (test code = 9315-5) 415          .               

           





                                  24 hr :   300 -  900                          
        Random:    50 - 1400                                  After 12hr fluid  
                                restriction:    >850Performed at:  [facility] 
[id number] [place], [place]  [id number] Director: Wolf Aragon MD, 
Phone:  [phone] [facility]- [facility] HEAD/BRAIN W/O 
CONT2020-02-10 13:07:00  Name: AMARILYS AMADOR               [facility] 
                    : 1945 Age/S: 74  / M         [address] [facility]     
           Unit #: [id number]     Loc:               [place]  [address]       
       Phys: Jennifer Quiles MD                                              
 Acct: [id number]  Dis Date:               Status: REG ER                     
             PHONE #: [phone]     Exam Date: 02/10/2020  1255              
       FAX #: [phone]      Reason: FRONTAL HEADACHE                        
            EXAMS:                                               CPT CODE:      
[id number] CT HEAD/BRAIN W/O CONT                     [address]                    
HISTORY: Frontal headaches.               COMPARISON: Head CT from 2019.               CT brain without contrast: Automated exposure control.      
         Location: [facility].               No acute intracranial bleeds or extra-
axial collections are noted. No       acute territorial vascular infarction is 
noted. 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               
The sulci, gyri, ventricles and subarachnoid spaces and the basilar       
cisterns are normal for patient's age. No herniation or hydrocephalus       or 
midline shift is noted. Ectatic basilar artery noted again.               
Extensive chronic periventricular ischemic gliosis is noted.       Age-
appropriate atrophy is noted as well.               Portions of the visualized 
paranasal sinuses are normal.               No obvious bony calvarial defect is 
noted.                 IMPRESSION:                   No acute intracranial 
bleeds or extra-axial collections.                   No acute territorial 
vascular infarction.                   No herniation or hydrocephalus or midline
 shift.                    Extensive chronic white matter ischemic disease and 
atrophy .                              ** Electronically Signed by FORREST Hernadez on 02/10/2020 at 1307 **                      Reported and signed by: 
Isaias Hernadez M.D.     CC: Jennifer Quiles MD                                
                                                                                
   Technologist:Driss Zamarripa RT(R),(MR),(CT)   CTDI:        DLP:        Trnscb 
Date/Time: 02/10/2020 (1307) t.SDR.TH4                        Orig Print D/T: S:
 02/10/2020 (3771)      PAGE  1                       Signed Report             
                  [id number] 07:12:00* 



             Test Item    Value        Reference Range Interpretation Comments

 

             GLUBED (test code = GLUBED) 91 mg/dL            N            

Performed by certified  at

 [facility]





TEIJAXZV--06-07 21:27:00* 



             Test Item    Value        Reference Range Interpretation Comments

 

             TROPONIN-I (test code = TROPI) <0.015 ng/mL 0-0.045      N         

    





COMMENTS TO PHLEBOTOMIST: COLLECT 3 HOURS AFTER PREVIOUS                        
   [id number] 20:57:00* 



             Test Item    Value        Reference Range Interpretation Comments

 

             GLUBED (test code = GLUBED) 93 mg/dL            N            

Performed by certified  at

 [facility]





- MRI BRAIN W/O [id number] 20:16:00 FAX:         Mandi Conway   
[phone]    [place]:    St: ADM FAX: Darby Stephens 
[phone]   ----------------------------------------
---------------------------------------  Name:   AAMRILYS AMADOR            
  [facility]                     : 1945  Age/S: 74/M           [address]
 [facility]                Unit #: [id number]      Loc: [place]  [address]              Phys: Mandi Conway NP                            
                  Acct: [id number] Dis Date:               Status: ADM IN     
                            PHONE #: [phone]     Exam Date:     2019                   FAX #: [phone]     Reason: TIA                  
                              EXAMS:                                            
   CPT CODE:      [id number] MRI BRAIN W/O CONTRAST                     [address]384    
                REASON FOR EXAM: TIA               Exam Order Date: 2019 12
:38 PM               Ordering: Mandi Conway NP       Attending:Darby Villa MD       Location:               Procedure:  - MRI BRAIN W/O CO
NTRAST               Comparison:               FINDINGS: Axial, sagittal, and co
tanvir images of the head were       obtained using T1, T2 weighted, inversion re
covery, and gradient echo       sequences.  The diffusion images are within norm
al limits without       abnormal signal intensity to suggest acute infarct. No I
V gadolinium       was given.               The sagittal images show normal pitu
itary, cerebellum, and brain stem.       No evidence of suprasellar mass.       
        The axial T2, inversion recovery, and gradient echo images show no      
 evidence of intra or extra axial mass. The ventricles, cisterns, and       sulc
i are unremarkable. No evidence of hemorrhage.  Mild       periventricular deep 
white matter disease.               The cerebellar pontine angle area is within 
normal limits. There is no       evidence of mass noted.               The axial
 T1 images show no evidence of mass.               The coronal images show todd
l optic chiasm.                 IMPRESSION: Chronic bilateral basal ganglia infa
rcts.  Nonspecific         deep white matter disease.  No acute findings        
  ** Electronically Signed by FORREST Santos on 2019 at 2016 **             
         Reported and signed by: Joshua Santos M.D.          PAGE  1                 
      Signed Report                    (CONTINUED)  FAX:         Aneta Conway   [phone]    [place]: B   St: ADM FAX: Darby Stephens [phone]
[phone]   ---------------------------------------------------------------------
----------  Name:   AMARILYS AMADOR              [facility]             
        : 1945  Age/S: 74/M           [address]                U
nit #: [id number]      [place]: [place]  [address]              Phys:
 Mandi Conway NP                                              Acct: 
[id number] Dis Date:               Status: ADM IN                                 P
LATRICIA #: [id number]     Exam Date:     2019                   FA
X #: [phone]     Reason: TIA                                               
 EXAMS:                                               CPT CODE:      [id number] M
RI BRAIN W/O CONTRAST                     [address]               <Continued>       
                                CC: Mandi Conway NP; Darby Villa MD                                                                     
                Technologist: Nazario SCHULTE)(MR)                        
     Trnscrd Date/Time/By: 2019 () : By: Destinee Wiley P
miah D/T: S: 2019 ()                         PAGE  2                  
     Signed Report                               [id number]--64-07 18:36:00* 



             Test Item    Value        Reference Range Interpretation Comments

 

             TROPONIN-I (test code = TROPI) <0.015 ng/mL 0-0.045      N         

    





COMMENTS TO PHLEBOTOMIST: COLLECT 3 HOURS AFTER PREVIOUS                        
   [id number] 16:55:00* 



             Test Item    Value        Reference Range Interpretation Comments

 

             GLUBED (test code = GLUBED) 115 mg/dL           H            

Performed by certified  

at [facility]





- CTA [id number] 15:33:00  Name: AMARILYS AMADOR               [facility]                     : 1945 Age/S: 74  / M         [address] 
[facility]                Unit #: [id number]     Loc:               [place]  [address]              Phys: Mandi Conway  NP                             
                 Acct: [id number]  Dis Date:               Status: ADM IN     
                             PHONE #: [phone]     Exam Date: 2019  
1303                     FAX #: [phone]      Reason: TIA                   
                              EXAMS:                                            
   CPT CODE:      [id number] CTA NECK                                   [address]    
                HISTORY: TIA               TECHNIQUE: Cerebral and Cervical CT 
angiography was acquired in the       axial plane after bolus IV administration 
of iodinated contrast.       Multiplanar, maximum intensity projection (MIP), 
and volume rendered       reconstructions were created on the 3-D workstation. 
Automated       exposure control for dose reduction.               COMPARISON: 
Noncontrast CT brain earlier today               FINDINGS:               There 
is atherosclerotic disease in the bilateral carotid bulbs but no       
significant stenosis. Atherosclerotic vascular calcification and       luminal 
irregularity of the bilateral cavernous ICA segments.       Bilateral petrous 
and supraclinoid ICA segments are patent. Normal       enhancement of the 
bilateral ophthalmic arteries. Bilateral A1 and       distal TOAN segments are 
patent. Anterior communicating artery is       present. Bilateral M1 and distal 
MCA branches are patent. No aneurysm.               Bilateral distal vertebral 
arteries are patent although the left       vertebral artery is hypoplastic 
compared to the right (likely       representing normal anatomic variant). 
Basilar artery is patent.       Bilateral PICAs and SCAs are patent. Normal a
ppearance of the basilar       tip. Bilateral P1 and distal PCA segments are pat
ent. Right posterior       commuting artery is not visualized and may be aplasti
c. Left posterior       commuting artery is present. No aneurysm.               
Dural venous sinuses and proximal internal jugular veins are patent.            
    Visualized brain parenchyma is unremarkable. No mass-effect or midline      
 shift. No hydrocephalus. Visualized paranasal sinuses and mastoid air       debora
ls are clear. Regional osseous structures structures are intact.                
         IMPRESSION:                   Atherosclerotic disease is present in the
 bilateral carotid bulbs and         the bilateral carotid siphons but there is 
no significant stenosis or         occlusion.         The right posterior commut
ing artery is not visualized and may be         aplastic. Remainder of the intra
cranial vessels are within normal         limits.                   Location: 
A     PAGE  1                       Signed Report                    (CONTINUED)
   Name: AMARILYS AMADOR               [facility]                     YOB: 1945 Age/S: 74  / M         [address] [facility]                Unit #: [id number]
[id number]     Loc:               [place]  [address]              Phys: JUSTIN Conway NP                                              Acct: [id number]  D
is Date:               Status: ADM IN                                  PHONE #: 
[phone]     Exam Date: 2019  1303                     FAX #: [phone]
[phone]      Reason: TIA                                                 EXAMS:    
                                           CPT CODE:      [id number] CTA NECK    
                               [address]               <Continued>                  
   ** Electronically Signed by Aleksander Pham MD on 2019 at 1533 **          
            Reported and signed by: Aleksander Pham MD                              
       CC: Mandi Conway NP; Darby Villa MD                  
                                                                   
Technologist:Driss Zamarripa RT(R),(MR),(CT)   CTDI:        DLP:        Trnscb 
Date/Time: 2019 (1533) t.SDR.RR31/t.SDR.RR31            Orig Print D/T: S:
 2019 (1536)      PAGE  2                       Signed Report             
                  - CTA [id number] 15:33:00  Name: AMARILYS AMADOR      
         [facility]                     : 1945 Age/S: 74  / M      
   [address] [facility]                Unit #: [id number]     Loc:               
[place]  [address]              Phys: Mandi Conway  NP                  
                            Acct: [id number]  Dis Date:               Status: 
ADM IN                                  PHONE #: [phone]     Exam Date: 
2019  1303                     FAX #: [phone]      Reason: TIA       
                                          EXAMS:                                
               CPT CODE:      [id number] CTA HEAD                                
   [address]                    HISTORY: TIA               TECHNIQUE: Cerebral and 
Cervical CT angiography was acquired in the       axial plane after bolus IV 
administration of iodinated contrast.       Multiplanar, maximum intensity 
projection (MIP), and volume rendered       reconstructions were created on the 
3-D workstation. Automated       exposure control for dose reduction.           
    COMPARISON: Noncontrast CT brain earlier today               FINDINGS:      
         There is atherosclerotic disease in the bilateral carotid bulbs but no 
      significant stenosis. Atherosclerotic vascular calcification and       
luminal irregularity of the bilateral cavernous ICA segments.       Bilateral 
petrous and supraclinoid ICA segments are patent. Normal       enhancement of 
the bilateral ophthalmic arteries. Bilateral A1 and       distal TOAN segments 
are patent. Anterior communicating artery is       present. Bilateral M1 and 
distal MCA branches are patent. No aneurysm.               Bilateral distal 
vertebral arteries are patent although the left       vertebral artery is hypo
plastic compared to the right (likely       representing normal anatomic variant
). Basilar artery is patent.       Bilateral PICAs and SCAs are patent. Normal a
ppearance of the basilar       tip. Bilateral P1 and distal PCA segments are pat
ent. Right posterior       commuting artery is not visualized and may be aplasti
c. Left posterior       commuting artery is present. No aneurysm.               
Dural venous sinuses and proximal internal jugular veins are patent.            
    Visualized brain parenchyma is unremarkable. No mass-effect or midline      
 shift. No hydrocephalus. Visualized paranasal sinuses and mastoid air       debora
ls are clear. Regional osseous structures structures are intact.                
         IMPRESSION:                   Atherosclerotic disease is present in the
 bilateral carotid bulbs and         the bilateral carotid siphons but there is 
no significant stenosis or         occlusion.         The right posterior commut
ing artery is not visualized and may be         aplastic. Remainder of the intra
cranial vessels are within normal         limits.                   Location: 
A     PAGE  1                       Signed Report                    (CONTINUED)
   Name: AMARILYS AMADOR               [facility]
B: 1945 Age/S: 74  / M         [facility]                Unit #: [id number]
[id number]     Loc:               [place]  [address]              Phys: JUSTIN Conway  NP                                              Acct: [id number]  D
is Date:               Status: ADM IN                                  PHONE #: 
[phone]     Exam Date: 2019  1303                     FAX #: [phone]
[phone]      Reason: TIA                                                 EXAMS:    
                                           CPT CODE:      [id number] CTA HEAD    
                               [address]               <Continued>                  
   ** Electronically Signed by Aleksander Pham MD on 2019 at 1533 **          
            Reported and signed by: Aleksander Pham MD                              
       CC: Mandi Conway  NP; Darby Villa MD                  
                                                                   
Technologist:Driss Zamarripa RT(R),(MR),(CT)   CTDI:        DLP:        Trnscb 
Date/Time: 2019 (4393) t.SDR.RR31/t.SDR.RR31            Orig Print D/T: S:
 2019 (2697)      PAGE  2                       Signed Report             
                  LIPID PROFILE (CORONARY RISK)2019 14:18:00* 



             Test Item    Value        Reference Range Interpretation Comments

 

             TRIGLYCERIDES (test code = TRIG) 146 mg/dL           N       

      

 

             CHOLESTEROL (test code = CHOL) 177 mg/dL    0-200        N         

    

 

             CHOLESTEROL/HDL RATIO (test code = CHOLHDL) 2.0 RATIO    0-4.9     

   N            RISK ASSOCIATED 

WITH CHOL/HDL RATIOS:     Risk          Male       Female1/2 AVERAGE        3.43
        3.27AVERAGE            4.97        4.442X AVERAGE         9.55        
7.053X AVERAGE         23.39      11.04 REFERENCE VALUE IS RELATED TO RISK 
LEVELS ASRECOMMENDED BY THE THI. HEART, LUNG, AND BLOOD INST.

 

             HDL CHOLESTEROL (test code = HDL) 60 mg/dL     40-60        N      

       

 

             LIPOPROTEIN LDL (test code = LDL) 102 mg/dL    100-129      N      

      RN PERSONNEL, CONTACT 

PHYSICIAN IMMEDIATELY IF THIS IS A STROKE, AMI OR CAROTID STENOSIS PATIENT WHEN 
THE LDL >100 (1ST OCCURENCE, THIS 
ADMISSION)===========================================================Reference 
Interval:          mg/dL          
mmol/L-----------------------------------------------------------Optimal        
              <100           <2.6Near/above optimal          100-129        2.6-
3.3Borderline High             130-159        3.4-4.1High                       
 160-189        4.1-4.9Very High                    >=190          
>=4.9========= This LDL result is a direct measurement.=========





- XR CHEST 1 -02-82 12:08:00 FAX:         Cole Cherry MD     [phone]
    [place]:    St: ADM----------
---------------------------------------------------------------------  Name:   AMARILYS TUCKER              [facility]                     : [birth year]  Age/S: 74/M           [address] [facility]                Unit #: [id number]    
  Loc: [place]  [address]              Phys: Cole Cherry MD    
                                                Acct: [id number] Dis Date:    
           Status: ADM IN                                 PHONE #: [phone] 
    Exam Date:     2019     1115                   FAX #: [phone]    
 Reason: CODE STROKE                                        EXAMS:              
                                 CPT CODE:      [id number] XR CHEST 1 V          
                     [id number]                            REASON FOR EXAM: CODE STRO
KE               Exam Order Date: 2019 11:03 AM               Ordering M.D.
: Cole Cherry MD               PROCEDURE:  - XR CHEST 1 V               COMPAR
DANNY: 2 view chest x-ray 2014               FINDINGS:         There 
are patchy opacities in the mid to lower lungs bilaterally and       the left co
stophrenic recess is not clearly visualized. Upper lungs       are clear.       
        Cardiomediastinal silhouette is normal in size for technique. The       
mediastinal contours are within normal limits.               No acute musculoske
letal abnormality.               The visualized upper abdomen is within normal l
imits.                         IMPRESSION:         Bibasilar opacities may repre
sent any combination of atelectasis,         aspiration, and pneumonia. Addition
ally small left sided pleural         effusion cannot be excluded. The upper azar
gs are clear.                   Location: [facility]          ** Electronically Signed 
by Aleksander Pham MD on 2019 at 1208 **                      Reported and sig
balwinder by: Aleksander Phma MD          CC: Cole Cherry MD                            
                                                                                
            Technologist: Breanne FLOOD(R)                                  
 Trnscrd Date/Time/By: 2019 (9764) : By: [email or web address]          Orig Print D/
T: S: 2019 (4864)                         PAGE  1                       Si
gned Report                               BASIC METABOLIC [id number] 
11:41:00* 



             Test Item    Value        Reference Range Interpretation Comments

 

             SODIUM (test code = NA) 139 mmol/L   136-145      N             

 

             POTASSIUM (test code = K) 3.8 mmol/L   3.5-5.1      N             

 

             CHLORIDE (test code = CL) 102.0 mmol/L        N             

 

             CARBON DIOXIDE (test code = CO2) 30.0 mmol/L  21-32        N       

      

 

             ANION GAP (test code = GAP) 10.8         10-20        N            

 

 

             GLUCOSE (test code = GLU) 145 mg/dL           H             

 

             BLOOD UREA NITROGEN (test code = BUN) 14 mg/dL     7-18         N  

           

 

             GLOMERULAR FILTRATION RATE (test code = GFR) 59 mL/min    >=60     

                 Estimated GFR by 

using Modified MDRD formula.Chronic kidney disease is defined as either kidney 
damageor GFR <60 mL/min/1.73 m2 for >3 months.

 

             CREATININE (test code = CREAT) 1.20 mg/dL   0.7-1.3      N         

    

 

             BUN/CREATININE RATIO (test code = BUN/CREA) 11.9         10-20     

   N             

 

             CALCIUM (test code = CA) 9.2 mg/dL    8.5-10.1     N             





[id number] 11:41:00* 



             Test Item    Value        Reference Range Interpretation Comments

 

             TROPONIN-I (test code = TROPI) <0.015 ng/mL 0-0.045      N         

    





BASIC METABOLIC [id number] 11:33:00* 



             Test Item    Value        Reference Range Interpretation Comments

 

             SODIUM (test code = NA) 139 mmol/L   136-145      N             

 

             POTASSIUM (test code = K) 3.8 mmol/L   3.5-5.1      N             

 

             CHLORIDE (test code = CL) 102.0 mmol/L        N             

 

             CARBON DIOXIDE (test code = CO2)  mmol/L      21-32                

      

 

             ANION GAP (test code = GAP)              10-20                     

 

 

             GLUCOSE (test code = GLU)  mg/dL                            

 

             BLOOD UREA NITROGEN (test code = BUN)  mg/dL       7-18            

           

 

             GLOMERULAR FILTRATION RATE (test code = GFR)  mL/min      >=60     

                  

 

             CREATININE (test code = CREAT)  mg/dL       0.7-1.3                

    

 

             BUN/CREATININE RATIO (test code = BUN/CREA)              10-20     

                 

 

             CALCIUM (test code = CA)  mg/dL       8.5-10.1                   





[id number] 11:33:00* 



             Test Item    Value        Reference Range Interpretation Comments

 

             TROPONIN-I (test code = TROPI)  ng/mL       0-0.045                

    





CBC W/AUTO [id number] 11:28:00* 



             Test Item    Value        Reference Range Interpretation Comments

 

             WHITE BLOOD CELL (test code = WBC) 6.1 K/mm3    4.5-12.5     N     

        

 

             RED BLOOD CELL (test code = RBC) 3.87 mill/mm3 4.0-5.8      L      

       

 

             HEMOGLOBIN (test code = HGB) 12.1 gram/dL 13.0-17.5    L           

  

 

             HEMATOCRIT (test code = HCT) 37.3 %       42.0-52.0    L           

  

 

             MEAN CELL VOLUME (test code = MCV) 96.4 fL      80-98        N     

        

 

             MEAN CELL HGB (test code = MCH) 31.3 picogram 27.0-33.0    N       

      

 

             MEAN CELL HGB CONCETRATION (test code = MCHC) 32.4 gram/dL 33.0-36.

0    L             

 

             RED CELL DISTRIBUTION WIDTH (test code = RDW) 12.9 %       11.6-16.

2    N             

 

             RED CELL DISTRIBUTION WIDTH SD (test code = RDW-SD) 45.7 fL      37

.0-51.0    N             

 

             PLATELET COUNT (test code = PLT) 230 K/mm3    150-450      N       

      

 

             MEAN PLATELET VOLUME (test code = MPV) 9.8 fL       6.7-11.0     N 

            

 

             NEUTROPHIL % (test code = NT%) 61.9 %       39.0-69.0    N         

    

 

             IMMATURE GRANULOCYTE % (test code = IG%) 0.5 %        0.0-5.0      

N             

 

             LYMPHOCYTE % (test code = LY%) 25.1 %       25.0-55.0    N         

    

 

             MONOCYTE % (test code = MO%) 7.8 %        0.0-10.0     N           

  

 

             EOSINOPHIL % (test code = EO%) 3.0 %        0.0-5.0      N         

    

 

             BASOPHIL % (test code = BA%) 1.7 %        0.0-1.0      H           

  

 

             NUCLEATED RBC % (test code = NRBC%) 0.0 %        0-0          N    

         

 

             NEUTROPHIL # (test code = NT#) 3.76 K/mm3   1.8-7.7      N         

    

 

             IMMATURE GRANULOCYTE # (test code = IG#) 0.03 x10 3/uL 0-0.03      

 N             

 

             LYMPHOCYTE # (test code = LY#) 1.52 K/mm3   1.0-5.0      N         

    

 

             MONOCYTE # (test code = MO#) 0.47 K/mm3   0-0.8        N           

  

 

             EOSINOPHIL # (test code = EO#) 0.18 K/mm3   0.0-0.5      N         

    

 

             BASOPHIL # (test code = BA#) 0.10 K/mm3   0.0-0.2      N           

  

 

             NUCLEATED RBC # (test code = NRBC#) 0.00 K/mm3   0.0-0.1      N    

         

 

             MANUAL DIFF REQUIRED (test code = MDIFF) NO                        

              





PROTHROMBIN [id number] 11:26:00* 



             Test Item    Value        Reference Range Interpretation Comments

 

             PROTHROMBIN TIME PATIENT (test code = PTP) 11.2 seconds 9.0-14.0   

  N             

 

             INTERNATIONAL NORMAL RATIO (test code = INR) 1.0          0.8-1.2  

    N            The therapeutic range

 for oral anticoagulant therapy formost indications is an international 
normalized ratio (INR)of between 2.0 and 3.0.  The recommended therapeutic 
INRrange for various clinical situations is listed 
below:_________________________________________________________Clinical 
Situation                          INR 
range_________________________________________________________ Pulmonary e
mbolism treatment              (2.0-3.0)Venous thrombosis treatmentVenous 
thrombosis prophylaxis (high risk surgery)Prevention of systemic embolism from: 
        Acute myocardial infarction         Valvular heart disease         
Atrial fibrillation Mechanical prosthetic heart valves          (2.5-3.5)





IS PATIENT ON ANTICOAGULANTS? NTHROMBOPLASTIN TIME [id number] 11:26:00* 



             Test Item    Value        Reference Range Interpretation Comments

 

             THROMBOPLASTIN TIME PARTIAL (test code = PTT) 29.0 seconds 25.0-36.

5    N             





IS PATIENT ON ANTICOAGULANTS? N- CT HEAD/BRAIN W/O [id number] 11:16:00  
Name: AMARILYS AMADOR               [facility]                     : 
1945 Age/S: 74  / M         [address] [facility]                Unit #: [id number]
[id number]     Loc:               [place]  [address]              Phys: Bola Cherry MD                                                    Acct: [id number]  Di
s Date:               Status: PRE ER                                  PHONE #: [phone]
[phone]     Exam Date: 2019  1103                     FAX #: [phone]
[phone]      Reason: R sided weaness and numbness                        EXAMS:     
                                          CPT CODE:      [id number] CT HEAD/BRAIN
 W/O CONT                     [address]                    HISTORY:  R sided weaness
 and numbness               TECHNIQUE: Noncontrast 2.5 mm axial CT of the head. 
Examination       acquired within 24 hours of arrival. Automated exposure contro
l for       dose reduction.               COMPARISON: Noncontrast CT brain 2015               FINDINGS:               No lacerations or contusions o
f the scalp or facial soft tissues.        Calvarium and skull base are intact. 
              No acute hemorrhage. No intracranial mass, mass effect, or midline
       shift. No effacement of the sulci or grey-white matter interface.        
        Cortical atrophy with moderate ventriculomegaly and microvascular       
ischemic changes of the white matter appear to have progressed since       the p
rior exam. There are also small hypodense lesions in the       bilateral thalami
 which may represent lacunar infarcts.               Visualized paranasal sinuse
s are clear.        Mastoid air cells and middle ear cavities are clear.        
Prior lens extraction bilaterally.                          IMPRESSION:         
          No acute intracranial hemorrhage or mass effect or large vascular     
    territorial infarct.         Diffuse cortical atrophy with microvascular isc
hemic changes appear to         have progressed since the prior exam in .   
      Small hypodense lesions in the bilateral thalami may represent lacunar    
     infarcts.                   Location: [facility]          ** Electronically Signed
 by Aleksander Pham MD on 2019 at 1116 **                      Reported and si
gned by: Aleksander Pham MD          PAGE  1                       Signed Report    
                (CONTINUED)   Name: AMAIRLYS AMADOR               [facility]                     : 1945 Age/S: 74  / M         [address]
                Unit #: [id number]     Loc:               [place]  [place]  [address]  
            Phys: Cole Cherry MD                                              
      Acct: [id number]  Dis Date:               Status: PRE ER                
                  PHONE #: [phone]     Exam Date: 2019  1103         
            FAX #: [phone]      Reason: R sided weaness and numbness       
                 EXAMS:                                               CPT CODE: 
     [id number] CT HEAD/BRAIN W/O CONT                     [address]               <
Continued>                                       CC: Cole Cherry MD           
                                                                                
                             Technologist:Kym Colorado RT(R),CT    CTDI: 
       DLP:        Trnscb Date/Time: 2019 (1116) t.SDR.RR31               
        Orig Print D/T: S: 2019 (1119)      PAGE  2                       
Signed Report                               CT MAXIO FAC/PARANAS [id number] 
14:40:00                                                                        
              [place] [address]      Patient Name: AMARILYS AMADOR                                   MR #: [id number]                     : 
1945                                   Age/Sex: 73/M  Acct #: [id number]
                              Req #: [id number]  Adm Physician:                 
                                     Ordered by: LEONOR PADILLA NP               
             Report #: [id number]        Location: CT                            
          Room/Bed:                     
_________________________________________________
__________________________________________________    Procedure: [id number] CT/CT
 MAXIO FAC/PARANAS WO  Exam Date: 19                            Exam Time:
 0900                                              REPORT STATUS: Signed    Hist
ory: Maxillary sinusitis   Comparison studies: None      Technique:    Axial nitin
ges were obtained through the paranasal sinuses.   Coronal and sagittal images r
econstructed from the axial data.   Radiation dose: Total DLP: 295 mGy*cm. Estim
ated effective dose: DLP x 0.015    Intravenous contrast: None      Findings:   
   Right anterior complex:   Frontal sinus: Clear aside from mild mucosal thicke
yu in the anteroinferior   sinus.   Frontonasal recess: Patent.   Anterior eth
moid air cells: Clear.   Ostiomeatal unit: Maxillary ostium is narrowed by mucos
al thickening but is   patent. The ethmoid infundibulum and hiatus semilunaris a
re likewise patent.   Maxillary sinus: Partially opacified with peripheral mucos
al thickening,   greatest along the alveolar recess.      Left anterior complex:
   Frontal sinus: Clear.   Frontonasal recess: Patent.   Anterior ethmoid air ce
lls: Mild mucosal thickening otherwise clear.   Ostiomeatal unit: Maxillary osti
um narrowed by mucosal thickening but patent.   The ethmoid infundibulum and hia
tus semilunaris are likely patent.   Maxillary sinus: Peripheral mucosal thicken
ing predominantly along the alveolar   recess.      Posterior complex:   Left sp
henoid sinus: Clear.   Right sphenoid sinus: Contains small nonspecific frothy s
ecretions or debris.    Clear. Sphenoethmoidal recesses: Clear.   Posterior ethm
oid air cells: Clear.         Other:    Nasal vestibule and cavity: Patent   Selvin
al septum: Deviated to the patient's right.   Agger Nasi: Clear bilaterally.   T
urbinates:  Non-aerated bilaterally.   Manny cells: None    Lamina papyracea: I
ntact.   Cribriform plates: Approximate 3 to 4 mm on the right and 4 to 5 mm on 
the left   below the level of the fovea ethmoidalis.   Olfactory recesses: Clear
   Optic canals: Not dehiscent   Onodi cells: None   Internal carotid canals: No
t dehiscent. Both carotid canal slightly bulge into   the left sphenoid sinus wi
th air covered by thin bony plates.   Sphenoid sinuses: Asymmetric with dominant
 left sinus. The lateral recesses are   not aerated. The septum inserts to the r
ight of midline and inserts along the   anterior wall the right cavernous caroti
d canal.      Orbits: No abnormalities.   Bones: No fractures or lytic or blasti
c lesions.   Temporal bones: No gross abnormalities.      Dentition: Edentulous 
maxilla. Multiple absent mandibular teeth with mandibular   dental caries      I
ncluded brain: Mild generalized cerebral volume loss with nonspecific   perivent
ricular hypodensities which may reflect chronic microvascular ischemic   changes
. Scattered calcified atherosclerosis in the carotid siphons, and   dominant rig
ht intradural vertebral artery and in the basilar artery with mild   vertebrobas
ilar dolichoectasia.      Incidental findings:    Lens replacements for previous
 cataract surgery.   Calcified atherosclerosis in the proximal left cervical int
ernal carotid   artery.      IMPRESSION:      1.  Nonspecific inflammatory mucos
al thickening in the maxillary sinuses, right   inferior frontal sinus and left 
anterior ethmoids with small nonspecific debris   or secretions in the right sph
enoid sinus.   2.  Sinus drainage pathways are patent.   3.  Rightward nasal sep
nelly deviation.      Signed by: Dr. Raymond Jacinto M.D. on 2019 3:02 PM      
  Dictated By: RAYMOND JACINTO MD  Electronically Signed By: RAYMOND JACINTO MD 
on 19 1502  Transcribed By: ZELDA on 19 1502       COPY TO:   LEONOR CANSECO NP

## 2020-10-18 VITALS — SYSTOLIC BLOOD PRESSURE: 182 MMHG | DIASTOLIC BLOOD PRESSURE: 81 MMHG

## 2020-10-18 VITALS — SYSTOLIC BLOOD PRESSURE: 142 MMHG | DIASTOLIC BLOOD PRESSURE: 123 MMHG

## 2020-10-18 VITALS — DIASTOLIC BLOOD PRESSURE: 60 MMHG | SYSTOLIC BLOOD PRESSURE: 156 MMHG

## 2020-10-18 VITALS — SYSTOLIC BLOOD PRESSURE: 162 MMHG | DIASTOLIC BLOOD PRESSURE: 81 MMHG

## 2020-10-18 VITALS — DIASTOLIC BLOOD PRESSURE: 70 MMHG | SYSTOLIC BLOOD PRESSURE: 147 MMHG

## 2020-10-18 VITALS — SYSTOLIC BLOOD PRESSURE: 150 MMHG | DIASTOLIC BLOOD PRESSURE: 63 MMHG

## 2020-10-18 VITALS — DIASTOLIC BLOOD PRESSURE: 75 MMHG | SYSTOLIC BLOOD PRESSURE: 161 MMHG

## 2020-10-18 VITALS — SYSTOLIC BLOOD PRESSURE: 177 MMHG | DIASTOLIC BLOOD PRESSURE: 89 MMHG

## 2020-10-18 VITALS — DIASTOLIC BLOOD PRESSURE: 71 MMHG | SYSTOLIC BLOOD PRESSURE: 151 MMHG

## 2020-10-18 VITALS — DIASTOLIC BLOOD PRESSURE: 59 MMHG | SYSTOLIC BLOOD PRESSURE: 128 MMHG

## 2020-10-18 VITALS — SYSTOLIC BLOOD PRESSURE: 164 MMHG | DIASTOLIC BLOOD PRESSURE: 79 MMHG

## 2020-10-18 VITALS — SYSTOLIC BLOOD PRESSURE: 165 MMHG | DIASTOLIC BLOOD PRESSURE: 65 MMHG

## 2020-10-18 VITALS — DIASTOLIC BLOOD PRESSURE: 61 MMHG | SYSTOLIC BLOOD PRESSURE: 133 MMHG

## 2020-10-18 VITALS — DIASTOLIC BLOOD PRESSURE: 85 MMHG | SYSTOLIC BLOOD PRESSURE: 181 MMHG

## 2020-10-18 VITALS — DIASTOLIC BLOOD PRESSURE: 82 MMHG | SYSTOLIC BLOOD PRESSURE: 140 MMHG

## 2020-10-18 VITALS — SYSTOLIC BLOOD PRESSURE: 149 MMHG | DIASTOLIC BLOOD PRESSURE: 59 MMHG

## 2020-10-18 VITALS — DIASTOLIC BLOOD PRESSURE: 77 MMHG | SYSTOLIC BLOOD PRESSURE: 171 MMHG

## 2020-10-18 VITALS — SYSTOLIC BLOOD PRESSURE: 128 MMHG | DIASTOLIC BLOOD PRESSURE: 63 MMHG

## 2020-10-18 VITALS — DIASTOLIC BLOOD PRESSURE: 57 MMHG | SYSTOLIC BLOOD PRESSURE: 139 MMHG

## 2020-10-18 VITALS — DIASTOLIC BLOOD PRESSURE: 99 MMHG | SYSTOLIC BLOOD PRESSURE: 135 MMHG

## 2020-10-18 VITALS — DIASTOLIC BLOOD PRESSURE: 70 MMHG | SYSTOLIC BLOOD PRESSURE: 163 MMHG

## 2020-10-18 VITALS — DIASTOLIC BLOOD PRESSURE: 57 MMHG | SYSTOLIC BLOOD PRESSURE: 117 MMHG

## 2020-10-18 VITALS — DIASTOLIC BLOOD PRESSURE: 80 MMHG | SYSTOLIC BLOOD PRESSURE: 163 MMHG

## 2020-10-18 VITALS — SYSTOLIC BLOOD PRESSURE: 164 MMHG | DIASTOLIC BLOOD PRESSURE: 88 MMHG

## 2020-10-18 VITALS — DIASTOLIC BLOOD PRESSURE: 76 MMHG | SYSTOLIC BLOOD PRESSURE: 158 MMHG

## 2020-10-18 VITALS — DIASTOLIC BLOOD PRESSURE: 75 MMHG | SYSTOLIC BLOOD PRESSURE: 173 MMHG

## 2020-10-18 LAB
ALBUMIN SERPL-MCNC: 3.3 G/DL (ref 3.5–5)
ALBUMIN/GLOB SERPL: 1.1 {RATIO} (ref 0.8–2)
ALP SERPL-CCNC: 63 IU/L (ref 40–150)
ALT SERPL-CCNC: 15 IU/L (ref 0–55)
ANION GAP SERPL CALC-SCNC: 11 MMOL/L (ref 8–16)
BACTERIA URNS QL MICRO: (no result) /HPF
BASOPHILS # BLD AUTO: 0.1 10*3/UL (ref 0–0.1)
BASOPHILS NFR BLD AUTO: 1.3 % (ref 0–1)
BILIRUB UR QL: NEGATIVE
BUN SERPL-MCNC: 7 MG/DL (ref 7–26)
BUN SERPL-MCNC: 9 MG/DL (ref 7–26)
BUN/CREAT SERPL: 11 (ref 6–25)
CALCIUM SERPL-MCNC: 8.3 MG/DL (ref 8.4–10.2)
CHLORIDE SERPL-SCNC: 90 MMOL/L (ref 98–107)
CLARITY UR: CLEAR
CO2 SERPL-SCNC: 23 MMOL/L (ref 22–29)
COLOR UR: YELLOW
CREAT UR-MCNC: 29.99 MG/DL (ref 63–166)
DEPRECATED NEUTROPHILS # BLD AUTO: 3.5 10*3/UL (ref 2.1–6.9)
DEPRECATED RBC URNS MANUAL-ACNC: (no result) /HPF (ref 0–5)
EGFRCR SERPLBLD CKD-EPI 2021: > 60 ML/MIN (ref 60–?)
EOSINOPHIL # BLD AUTO: 0.3 10*3/UL (ref 0–0.4)
EOSINOPHIL NFR BLD AUTO: 5.5 % (ref 0–6)
EPI CELLS URNS QL MICRO: (no result) /LPF
ERYTHROCYTE [DISTWIDTH] IN CORD BLOOD: 12.5 % (ref 11.7–14.4)
GLOBULIN PLAS-MCNC: 3.1 G/DL (ref 2.3–3.5)
GLUCOSE SERPLBLD-MCNC: 118 MG/DL (ref 74–118)
GLUCOSE SERPLBLD-MCNC: 96 MG/DL (ref 74–118)
HCT VFR BLD AUTO: 30.7 % (ref 38.2–49.6)
HGB BLD-MCNC: 10.9 G/DL (ref 14–18)
KETONES UR QL STRIP.AUTO: NEGATIVE
LEUKOCYTE ESTERASE UR QL STRIP.AUTO: NEGATIVE
LYMPHOCYTES # BLD: 1.4 10*3/UL (ref 1–3.2)
LYMPHOCYTES NFR BLD AUTO: 23 % (ref 18–39.1)
MCH RBC QN AUTO: 31.6 PG (ref 28–32)
MCHC RBC AUTO-ENTMCNC: 35.5 G/DL (ref 31–35)
MCV RBC AUTO: 89 FL (ref 81–99)
MONOCYTES # BLD AUTO: 0.8 10*3/UL (ref 0.2–0.8)
MONOCYTES NFR BLD AUTO: 12.6 % (ref 4.4–11.3)
NEUTS SEG NFR BLD AUTO: 57.3 % (ref 38.7–80)
NITRITE UR QL STRIP.AUTO: NEGATIVE
OSMOLALITY SERPL CALC.SUM OF ELEC: 242 MOSM/KG (ref 278–305)
PLATELET # BLD AUTO: 230 X10E3/UL (ref 140–360)
POTASSIUM SERPL-SCNC: 4 MMOL/L (ref 3.5–5.1)
PROT UR QL STRIP.AUTO: (no result)
RBC # BLD AUTO: 3.45 X10E6/UL (ref 4.3–5.7)
SODIUM SERPL-SCNC: 120 MMOL/L (ref 136–145)
SODIUM SERPL-SCNC: 120 MMOL/L (ref 136–145)
SODIUM UR-SCNC: 53 MMOL/L
SP GR UR STRIP: 1.02 (ref 1.01–1.02)
UROBILINOGEN UR STRIP-MCNC: 0.2 MG/DL (ref 0.2–1)
WBC #/AREA URNS HPF: (no result) /HPF (ref 0–5)

## 2020-10-18 RX ADMIN — ENOXAPARIN SODIUM SCH MG: 40 INJECTION SUBCUTANEOUS at 17:58

## 2020-10-18 RX ADMIN — Medication SCH MG: at 09:13

## 2020-10-18 RX ADMIN — BUDESONIDE AND FORMOTEROL FUMARATE DIHYDRATE SCH EA: 160; 4.5 AEROSOL RESPIRATORY (INHALATION) at 19:00

## 2020-10-18 NOTE — CONSULTATION
DATE OF CONSULTATION:  

 

Pulmonary Critical Care Consultation 

 

CHIEF COMPLAINT:  Abnormal chest x-ray, confusion and low sodium.

 

HISTORY OF PRESENT ILLNESS:  The patient is a 74-year-old man.  He has a history of some

pulmonary fibrosis in the usual interstitial pneumonitis pattern on prior chest x-rays.

He uses albuterol intermittently at home. 

 

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  He

came into the emergency department with some increased confusion and headache.  He also

had some falling.  He denies any fever.  He is not having any chest pain.  He is not

having any cough or phlegm production.  Overall, he states he feels better. 

 

PAST SURGICAL HISTORY:  Cataract surgery.

 

PAST MEDICAL HISTORY:  

1. Pulmonary fibrosis with the usual interstitial pneumonitis, pattern.

2. Syndrome of inappropriate anti-diuretic hormone secretion.

3. Hypertension.

4. Possible COPD.

 

ALLERGIES:  NO KNOWN DRUG ALLERGIES.

 

SOCIAL HISTORY:  The patient does not smoke.  He was a drinker in the past, but quit.

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

the past. 

 

REVIEW OF SYSTEMS:

The patient is afebrile.  He complained of headache initially, but is not complaining of

headache now.  He has no neck pain.  He is not having chest pain.  He denies dyspnea or

cough.  He has no fever. 

 

PHYSICAL EXAMINATION:

He has no chest pain.  He has no abdominal pain.  There is no nausea or vomiting.  He

has no leg edema or swelling. 

PHYSICAL EXAMINATION:

VITAL SIGNS:  Blood pressure is 135/99, saturation is 100% on room air and the pulse is

71.  Respiratory rate is 18. 

HEENT:  Shows no facial swelling or erythema. 

LYMPHATIC:  Shows no submandibular, cervical or supraclavicular adenopathy. 

CARDIAC:  Reveals a regular rate and rhythm with normal S1, S2. 

LUNGS:  Auscultation of lungs shows rhonchorous breath sounds bilaterally.  There is no

wheezing. 

ABDOMEN:  Soft nontender.  There is no rebound or guarding. 

EXTREMITIES:  Shows no leg edema or calf tenderness.  There is no cyanosis or clubbing. 

SKIN:  Shows no rashes. 

NEUROLOGICAL:  Shows no focal abnormalities.  The patient does have some confusion.

LABORATORY DATA:  White blood cell count is 6.05, hemoglobin is 10.9, platelet count is

230.  The BUN to creatinine ratio is normal.  The sodium is 120.  Chloride is 90.

Carbon dioxide is 23.  The albumin is 3.3. 

 

RADIOGRAPHIC DATA:  Chest x-ray shows diffuse bilateral airspace opacities. 

 

CT scan of the chest shows chronic interstitial lung disease with an UIP pattern and

honeycombing at the bases. 

 

IMPRESSION:  

1. Metabolic encephalopathy.

2. Hyponatremia with syndrome of inappropriate anti-diuretic hormone release.

3. Pulmonary fibrosis of unclear etiology with minimal symptoms.

4. Hypertension.

5. 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

 

PLAN:  

1. The patient will be restarted on demeclocycline.

2. Fluid restriction and possible Nephrology consultation.

3. Continue rescue inhaler from home.

4. The patient should have pulmonary function tests as an outpatient and further

investigation of history to look for any possible pneumoconiosis or other underlying

causes for his pulmonary fibrosis. 

5. Restart the patient's antihypertensive medications.

6. Continue to monitor neurological status.

7. DVT prophylaxis.

8. Physical therapy.

9. Speech therapy to evaluate swallowing function.

 

 

 

 

______________________________

Jonnie Walters MD

 

[facility]

D:  10/18/2020 08:57:25

T:  10/18/2020 10:33:44

Job #:  [id number]

## 2020-10-18 NOTE — NUR
H&P



cc: confusion



HPI: 74yoM, with hx hyponatremia, now with confusion and hyponatremia, and CXR 
showing B/L pneumonia.  Pt unable to provide history.



PMH: Hyponatremia, Fall, Atypical PNA, Pulmonary fibrosis, HTN, SIADH

PSHx: unknown

Allergies; see emr

Fh/Sh; no illicits

meds; see MAR

ROS: unreliable



v/s revd

PE

tired appearing

anicteric

ns1s2

REDUCED BS

soft nt nd

no e/t

 skin dry

flat affect

awake; oriented to self

phipps



labs/meds revd



A/P

Multifocal PNA- IV abx; f/u COVID testing.

Hyponatremia- check urine/serum osm and urine Na.   fluid restrict

VIKY/AMS- due to hyponatremia

Pulmonary fibrosis- chronic; supportive

Prop: scd; pepcid

dipso: f/u Na. cct>35mins



PARDEEP BUSH MD, PHD.

## 2020-10-18 NOTE — NUR
Dr Reyes at bedside informed him patient is confused and kepts trying to get out of bed , 
bed alarms on

## 2020-10-18 NOTE — NUR
Phoned Dr Walters informed pt still yelling and trying to get out of bed> Since arellano 
placement 1 liter> Geodon 20mg IM now

## 2020-10-18 NOTE — NUR
Dr Walters here informed of 450cc bladder scan and pt continues to need to have bowel movement 
informed him patient had 2 BM today rest of time nothing and colace given.

## 2020-10-18 NOTE — NUR
Dr Reyes at bedside assistd pt up to bedside commode for the ninth time colace given to 
soften stool

## 2020-10-19 VITALS — DIASTOLIC BLOOD PRESSURE: 67 MMHG | SYSTOLIC BLOOD PRESSURE: 122 MMHG

## 2020-10-19 VITALS — DIASTOLIC BLOOD PRESSURE: 74 MMHG | SYSTOLIC BLOOD PRESSURE: 163 MMHG

## 2020-10-19 VITALS — SYSTOLIC BLOOD PRESSURE: 114 MMHG | DIASTOLIC BLOOD PRESSURE: 65 MMHG

## 2020-10-19 VITALS — SYSTOLIC BLOOD PRESSURE: 159 MMHG | DIASTOLIC BLOOD PRESSURE: 67 MMHG

## 2020-10-19 VITALS — DIASTOLIC BLOOD PRESSURE: 59 MMHG | SYSTOLIC BLOOD PRESSURE: 107 MMHG

## 2020-10-19 VITALS — DIASTOLIC BLOOD PRESSURE: 55 MMHG | SYSTOLIC BLOOD PRESSURE: 106 MMHG

## 2020-10-19 VITALS — SYSTOLIC BLOOD PRESSURE: 120 MMHG | DIASTOLIC BLOOD PRESSURE: 68 MMHG

## 2020-10-19 VITALS — DIASTOLIC BLOOD PRESSURE: 61 MMHG | SYSTOLIC BLOOD PRESSURE: 124 MMHG

## 2020-10-19 VITALS — SYSTOLIC BLOOD PRESSURE: 138 MMHG | DIASTOLIC BLOOD PRESSURE: 71 MMHG

## 2020-10-19 VITALS — DIASTOLIC BLOOD PRESSURE: 72 MMHG | SYSTOLIC BLOOD PRESSURE: 143 MMHG

## 2020-10-19 VITALS — SYSTOLIC BLOOD PRESSURE: 124 MMHG | DIASTOLIC BLOOD PRESSURE: 61 MMHG

## 2020-10-19 VITALS — SYSTOLIC BLOOD PRESSURE: 149 MMHG | DIASTOLIC BLOOD PRESSURE: 64 MMHG

## 2020-10-19 VITALS — DIASTOLIC BLOOD PRESSURE: 66 MMHG | SYSTOLIC BLOOD PRESSURE: 122 MMHG

## 2020-10-19 VITALS — SYSTOLIC BLOOD PRESSURE: 143 MMHG | DIASTOLIC BLOOD PRESSURE: 72 MMHG

## 2020-10-19 VITALS — SYSTOLIC BLOOD PRESSURE: 116 MMHG | DIASTOLIC BLOOD PRESSURE: 62 MMHG

## 2020-10-19 VITALS — DIASTOLIC BLOOD PRESSURE: 90 MMHG | SYSTOLIC BLOOD PRESSURE: 125 MMHG

## 2020-10-19 VITALS — DIASTOLIC BLOOD PRESSURE: 64 MMHG | SYSTOLIC BLOOD PRESSURE: 121 MMHG

## 2020-10-19 VITALS — SYSTOLIC BLOOD PRESSURE: 128 MMHG | DIASTOLIC BLOOD PRESSURE: 67 MMHG

## 2020-10-19 VITALS — DIASTOLIC BLOOD PRESSURE: 75 MMHG | SYSTOLIC BLOOD PRESSURE: 127 MMHG

## 2020-10-19 VITALS — DIASTOLIC BLOOD PRESSURE: 65 MMHG | SYSTOLIC BLOOD PRESSURE: 114 MMHG

## 2020-10-19 VITALS — SYSTOLIC BLOOD PRESSURE: 132 MMHG | DIASTOLIC BLOOD PRESSURE: 69 MMHG

## 2020-10-19 VITALS — DIASTOLIC BLOOD PRESSURE: 59 MMHG | SYSTOLIC BLOOD PRESSURE: 90 MMHG

## 2020-10-19 VITALS — DIASTOLIC BLOOD PRESSURE: 68 MMHG | SYSTOLIC BLOOD PRESSURE: 152 MMHG

## 2020-10-19 VITALS — DIASTOLIC BLOOD PRESSURE: 72 MMHG | SYSTOLIC BLOOD PRESSURE: 96 MMHG

## 2020-10-19 VITALS — DIASTOLIC BLOOD PRESSURE: 56 MMHG | SYSTOLIC BLOOD PRESSURE: 119 MMHG

## 2020-10-19 VITALS — SYSTOLIC BLOOD PRESSURE: 127 MMHG | DIASTOLIC BLOOD PRESSURE: 75 MMHG

## 2020-10-19 VITALS — SYSTOLIC BLOOD PRESSURE: 116 MMHG | DIASTOLIC BLOOD PRESSURE: 63 MMHG

## 2020-10-19 VITALS — SYSTOLIC BLOOD PRESSURE: 167 MMHG | DIASTOLIC BLOOD PRESSURE: 86 MMHG

## 2020-10-19 VITALS — SYSTOLIC BLOOD PRESSURE: 102 MMHG | DIASTOLIC BLOOD PRESSURE: 62 MMHG

## 2020-10-19 LAB
ALBUMIN SERPL-MCNC: 3.5 G/DL (ref 3.5–5)
ALBUMIN/GLOB SERPL: 1.1 {RATIO} (ref 0.8–2)
ALP SERPL-CCNC: 66 IU/L (ref 40–150)
ALT SERPL-CCNC: 15 IU/L (ref 0–55)
ANION GAP SERPL CALC-SCNC: 13.4 MMOL/L (ref 8–16)
BASOPHILS # BLD AUTO: 0.1 10*3/UL (ref 0–0.1)
BASOPHILS NFR BLD AUTO: 0.9 % (ref 0–1)
BUN SERPL-MCNC: 7 MG/DL (ref 7–26)
BUN/CREAT SERPL: 10 (ref 6–25)
CALCIUM SERPL-MCNC: 8.8 MG/DL (ref 8.4–10.2)
CHLORIDE SERPL-SCNC: 95 MMOL/L (ref 98–107)
CO2 SERPL-SCNC: 24 MMOL/L (ref 22–29)
DEPRECATED NEUTROPHILS # BLD AUTO: 6.9 10*3/UL (ref 2.1–6.9)
EGFRCR SERPLBLD CKD-EPI 2021: > 60 ML/MIN (ref 60–?)
EOSINOPHIL # BLD AUTO: 0 10*3/UL (ref 0–0.4)
EOSINOPHIL NFR BLD AUTO: 0.3 % (ref 0–6)
ERYTHROCYTE [DISTWIDTH] IN CORD BLOOD: 13 % (ref 11.7–14.4)
GLOBULIN PLAS-MCNC: 3.3 G/DL (ref 2.3–3.5)
GLUCOSE SERPLBLD-MCNC: 97 MG/DL (ref 74–118)
HCT VFR BLD AUTO: 36.4 % (ref 38.2–49.6)
HGB BLD-MCNC: 12.6 G/DL (ref 14–18)
LYMPHOCYTES # BLD: 1.1 10*3/UL (ref 1–3.2)
LYMPHOCYTES NFR BLD AUTO: 11.8 % (ref 18–39.1)
MCH RBC QN AUTO: 31 PG (ref 28–32)
MCHC RBC AUTO-ENTMCNC: 34.6 G/DL (ref 31–35)
MCV RBC AUTO: 89.7 FL (ref 81–99)
MONOCYTES # BLD AUTO: 1.1 10*3/UL (ref 0.2–0.8)
MONOCYTES NFR BLD AUTO: 11.4 % (ref 4.4–11.3)
NEUTS SEG NFR BLD AUTO: 75.2 % (ref 38.7–80)
PLATELET # BLD AUTO: 281 X10E3/UL (ref 140–360)
POTASSIUM SERPL-SCNC: 4.4 MMOL/L (ref 3.5–5.1)
RBC # BLD AUTO: 4.06 X10E6/UL (ref 4.3–5.7)
SODIUM SERPL-SCNC: 128 MMOL/L (ref 136–145)

## 2020-10-19 RX ADMIN — BUDESONIDE AND FORMOTEROL FUMARATE DIHYDRATE SCH EA: 160; 4.5 AEROSOL RESPIRATORY (INHALATION) at 19:37

## 2020-10-19 RX ADMIN — ENOXAPARIN SODIUM SCH MG: 40 INJECTION SUBCUTANEOUS at 17:10

## 2020-10-19 RX ADMIN — Medication SCH MG: at 08:05

## 2020-10-19 RX ADMIN — DEMECLOCYCLINE HYDROCHLORIDE SCH MG: 300 TABLET ORAL at 06:38

## 2020-10-19 RX ADMIN — BUDESONIDE AND FORMOTEROL FUMARATE DIHYDRATE SCH EA: 160; 4.5 AEROSOL RESPIRATORY (INHALATION) at 07:00

## 2020-10-19 RX ADMIN — CEFTRIAXONE SCH MLS/HR: 100 INJECTION, POWDER, FOR SOLUTION INTRAVENOUS at 13:18

## 2020-10-19 NOTE — NUR
received pt layin in bed, awake an oriented to self and place, follows commands, and is 
restless but cooperative at this time, VSS and pt in NSR on monitor

## 2020-10-19 NOTE — NUR
Nutrition Screen Note



RD Recommendation for Physician: 

- Continue regular diet



Plan of Care: RD following, monitoring for tolerance and adequacy



Nutrition reason for involvement: Nutrition Risk Trigger



Primary Diagnose(s): confusion, hyonatremia, and weakness



PMH:  Pulmonary fibrosis with the usual interstitial pneumonitis, pattern, Syndrome of 
inappropriate anti-diuretic hormone secretion, Hypertension, Possible COPD



Ht: 67 in 

Wt:158 lb

BMI: 24.7 kg/m2

IBW:148 lb



RD Assessment:

(10/19/20) Chart reviewed. Labs and meds reviewed. Pt is a 74 year old male admitted with 
confusion, hyponatremia, and weakness. Pt was discussed during interdisciplinary rounds. Per 
chart and RN, pt is confused and agitated, but is eating all of his meals. Unable to obtain 
weight history from pt at this time.  Will continue to monitor. 



Current Diet: regular



Malnutrition Evaluation (10/19/20)

The patient does not meet criteria for a specified degree of malnutrition at this time. Will 
re-evaluate at follow-up as appropriate. 





Diet Education Needs Assessment: Diet education not indicated.





Nutrition Care Level: low





Signed: Aiyana Pena, RD, LD

## 2020-10-19 NOTE — NUR
IM- progress note

O/N see below



ROS: unreliable



v/s revd

PE

tired appearing

anicteric

ns1s2

REDUCED BS

soft nt nd

no e/t

 skin dry

flat affect

awake; oriented to self

phipps



labs/meds revd



A/P

Multifocal PNA- IV abx; f/u COVID testing.

Hyponatremia- check urine/serum osm and urine Na.   fluid restrict

VIKY/AMS- due to hyponatremia

Pulmonary fibrosis- chronic; supportive

Prop: scd; pepcid

dipso: f/u Na. cct>35mins



10-19 Na improving; f/u urine Osm; cct>35mins



PARDEEP BUSH MD, PHD.

## 2020-10-19 NOTE — NUR
Report called to Angely patient transferred to room 106 via bed, tele monitor , all 
belongings, cellphone, purse, , shoes , red luggage and bags.

## 2020-10-19 NOTE — NUR
In room with patient remains confused and continues to try to get up and live while 
restraints are off , assisted patient to restroom allowed privacy for 20mins knocked on door 
pt continue to clean buttock with tissue no stool nor no flushing of toilet assisted pt back 
to bed. Once his back in bed pt what's to get up placed on bedside commode for 15 more 
minutes same behavior pt had large BM early am soft and formed. Patient continues to state 
he is living and when offered to call his wife Pam he said no. Placed back on monitor 
alarms on. Patient ate 100% of lunch without coughing or difficulty. Do not what his TV on

## 2020-10-19 NOTE — PROGRESS NOTE
DATE:  

 

Pulmonary Critical Care Progress Note 

 

SUBJECTIVE:  The patient still has some confusion.  He required Geodon last night. 

 

A Gutierrez catheter was placed and he had some urinary retention.  He has a low-grade

temperature this morning. 

 

PHYSICAL EXAMINATION:

VITAL SIGNS:  The blood pressure is 97/64, saturation is 100% on room air, and the

respiratory rate is 22.  Pulse is 77. 

HEENT:  Shows no facial swelling or erythema. 

LYMPHATIC:  Shows no submandibular, cervical or supraclavicular adenopathy. 

CARDIAC:  Reveals regular rate and rhythm with normal S1 and S2. 

LUNGS:  Auscultation of lungs reveals decreased breath sounds at the bases.  There is no

wheezing. 

ABDOMEN:  Soft and nontender.  There is no rebound or guarding. 

EXTREMITIES:  Shows no leg edema or calf tenderness.  There is no cyanosis or clubbing.

LABORATORY DATA:  Sodium is 128 and the BUN to creatinine ratio is 7 to 0.7.  The other

electrolytes are within normal limits.  White blood cell count is 9.2 and hemoglobin is

12.6.  The platelet count is 281. 

 

IMPRESSION:  

1. Metabolic encephalopathy.

2. Urinary retention.

3. Fever possibly secondary to urinary tract infection.

4. Hypertension.

5. Chronic pulmonary fibrosis.

6. Anemia, unspecified.

 

PLAN:  

1. Begin Rocephin.

2. Continue demeclocycline.

3. Continue fluid restriction.

4. Gutierrez catheter.

5. Continue to monitor neurological status and use risperidone as needed.

 

 

 

 

______________________________

Jonnie Walters MD

 

[facility]

D:  10/19/2020 11:59:40

T:  10/19/2020 12:17:35

Job #:  [id number]

## 2020-10-19 NOTE — NUR
DAUGHTER OTIS [phone] STATES HE IS FALLING MORE AT HOME, [facility] HAS HOME HEALTH WITH 
[facility].

## 2020-10-20 VITALS — SYSTOLIC BLOOD PRESSURE: 135 MMHG | DIASTOLIC BLOOD PRESSURE: 67 MMHG

## 2020-10-20 VITALS — DIASTOLIC BLOOD PRESSURE: 84 MMHG | SYSTOLIC BLOOD PRESSURE: 163 MMHG

## 2020-10-20 VITALS — SYSTOLIC BLOOD PRESSURE: 153 MMHG | DIASTOLIC BLOOD PRESSURE: 69 MMHG

## 2020-10-20 VITALS — SYSTOLIC BLOOD PRESSURE: 150 MMHG | DIASTOLIC BLOOD PRESSURE: 75 MMHG

## 2020-10-20 VITALS — SYSTOLIC BLOOD PRESSURE: 152 MMHG | DIASTOLIC BLOOD PRESSURE: 65 MMHG

## 2020-10-20 VITALS — SYSTOLIC BLOOD PRESSURE: 109 MMHG | DIASTOLIC BLOOD PRESSURE: 68 MMHG

## 2020-10-20 VITALS — DIASTOLIC BLOOD PRESSURE: 93 MMHG | SYSTOLIC BLOOD PRESSURE: 184 MMHG

## 2020-10-20 VITALS — SYSTOLIC BLOOD PRESSURE: 129 MMHG | DIASTOLIC BLOOD PRESSURE: 65 MMHG

## 2020-10-20 VITALS — SYSTOLIC BLOOD PRESSURE: 111 MMHG | DIASTOLIC BLOOD PRESSURE: 58 MMHG

## 2020-10-20 VITALS — DIASTOLIC BLOOD PRESSURE: 79 MMHG | SYSTOLIC BLOOD PRESSURE: 168 MMHG

## 2020-10-20 VITALS — SYSTOLIC BLOOD PRESSURE: 149 MMHG | DIASTOLIC BLOOD PRESSURE: 79 MMHG

## 2020-10-20 VITALS — SYSTOLIC BLOOD PRESSURE: 167 MMHG | DIASTOLIC BLOOD PRESSURE: 77 MMHG

## 2020-10-20 VITALS — DIASTOLIC BLOOD PRESSURE: 79 MMHG | SYSTOLIC BLOOD PRESSURE: 160 MMHG

## 2020-10-20 VITALS — SYSTOLIC BLOOD PRESSURE: 153 MMHG | DIASTOLIC BLOOD PRESSURE: 64 MMHG

## 2020-10-20 VITALS — SYSTOLIC BLOOD PRESSURE: 158 MMHG | DIASTOLIC BLOOD PRESSURE: 79 MMHG

## 2020-10-20 VITALS — DIASTOLIC BLOOD PRESSURE: 71 MMHG | SYSTOLIC BLOOD PRESSURE: 129 MMHG

## 2020-10-20 VITALS — SYSTOLIC BLOOD PRESSURE: 153 MMHG | DIASTOLIC BLOOD PRESSURE: 62 MMHG

## 2020-10-20 VITALS — SYSTOLIC BLOOD PRESSURE: 131 MMHG | DIASTOLIC BLOOD PRESSURE: 64 MMHG

## 2020-10-20 VITALS — SYSTOLIC BLOOD PRESSURE: 159 MMHG | DIASTOLIC BLOOD PRESSURE: 78 MMHG

## 2020-10-20 VITALS — SYSTOLIC BLOOD PRESSURE: 166 MMHG | DIASTOLIC BLOOD PRESSURE: 80 MMHG

## 2020-10-20 VITALS — SYSTOLIC BLOOD PRESSURE: 148 MMHG | DIASTOLIC BLOOD PRESSURE: 94 MMHG

## 2020-10-20 VITALS — SYSTOLIC BLOOD PRESSURE: 156 MMHG | DIASTOLIC BLOOD PRESSURE: 68 MMHG

## 2020-10-20 VITALS — DIASTOLIC BLOOD PRESSURE: 56 MMHG | SYSTOLIC BLOOD PRESSURE: 119 MMHG

## 2020-10-20 VITALS — DIASTOLIC BLOOD PRESSURE: 77 MMHG | SYSTOLIC BLOOD PRESSURE: 167 MMHG

## 2020-10-20 VITALS — DIASTOLIC BLOOD PRESSURE: 93 MMHG | SYSTOLIC BLOOD PRESSURE: 161 MMHG

## 2020-10-20 LAB
ALBUMIN SERPL-MCNC: 3.2 G/DL (ref 3.5–5)
ALBUMIN/GLOB SERPL: 0.9 {RATIO} (ref 0.8–2)
ALP SERPL-CCNC: 61 IU/L (ref 40–150)
ALT SERPL-CCNC: 13 IU/L (ref 0–55)
ANION GAP SERPL CALC-SCNC: 13.5 MMOL/L (ref 8–16)
BASOPHILS # BLD AUTO: 0.1 10*3/UL (ref 0–0.1)
BASOPHILS NFR BLD AUTO: 1.1 % (ref 0–1)
BUN SERPL-MCNC: 12 MG/DL (ref 7–26)
BUN/CREAT SERPL: 16 (ref 6–25)
CALCIUM SERPL-MCNC: 8.8 MG/DL (ref 8.4–10.2)
CHLORIDE SERPL-SCNC: 100 MMOL/L (ref 98–107)
CO2 SERPL-SCNC: 24 MMOL/L (ref 22–29)
DEPRECATED NEUTROPHILS # BLD AUTO: 5.2 10*3/UL (ref 2.1–6.9)
EGFRCR SERPLBLD CKD-EPI 2021: > 60 ML/MIN (ref 60–?)
EOSINOPHIL # BLD AUTO: 0.3 10*3/UL (ref 0–0.4)
EOSINOPHIL NFR BLD AUTO: 3.9 % (ref 0–6)
ERYTHROCYTE [DISTWIDTH] IN CORD BLOOD: 13.2 % (ref 11.7–14.4)
GLOBULIN PLAS-MCNC: 3.4 G/DL (ref 2.3–3.5)
GLUCOSE SERPLBLD-MCNC: 98 MG/DL (ref 74–118)
HCT VFR BLD AUTO: 36.3 % (ref 38.2–49.6)
HGB BLD-MCNC: 12.5 G/DL (ref 14–18)
LYMPHOCYTES # BLD: 1.1 10*3/UL (ref 1–3.2)
LYMPHOCYTES NFR BLD AUTO: 15.2 % (ref 18–39.1)
MCH RBC QN AUTO: 31.3 PG (ref 28–32)
MCHC RBC AUTO-ENTMCNC: 34.4 G/DL (ref 31–35)
MCV RBC AUTO: 91 FL (ref 81–99)
MONOCYTES # BLD AUTO: 0.8 10*3/UL (ref 0.2–0.8)
MONOCYTES NFR BLD AUTO: 10.3 % (ref 4.4–11.3)
NEUTS SEG NFR BLD AUTO: 69.2 % (ref 38.7–80)
PLATELET # BLD AUTO: 282 X10E3/UL (ref 140–360)
POTASSIUM SERPL-SCNC: 4.5 MMOL/L (ref 3.5–5.1)
RBC # BLD AUTO: 3.99 X10E6/UL (ref 4.3–5.7)
SODIUM SERPL-SCNC: 133 MMOL/L (ref 136–145)

## 2020-10-20 RX ADMIN — BUDESONIDE AND FORMOTEROL FUMARATE DIHYDRATE SCH EA: 160; 4.5 AEROSOL RESPIRATORY (INHALATION) at 19:45

## 2020-10-20 RX ADMIN — BUDESONIDE AND FORMOTEROL FUMARATE DIHYDRATE SCH EA: 160; 4.5 AEROSOL RESPIRATORY (INHALATION) at 07:13

## 2020-10-20 RX ADMIN — Medication PRN MG: at 17:02

## 2020-10-20 RX ADMIN — CEFTRIAXONE SCH MLS/HR: 100 INJECTION, POWDER, FOR SOLUTION INTRAVENOUS at 13:40

## 2020-10-20 RX ADMIN — DEMECLOCYCLINE HYDROCHLORIDE SCH MG: 300 TABLET ORAL at 05:27

## 2020-10-20 RX ADMIN — Medication SCH MG: at 08:56

## 2020-10-20 RX ADMIN — ENOXAPARIN SODIUM SCH MG: 40 INJECTION SUBCUTANEOUS at 17:02

## 2020-10-20 NOTE — PROGRESS NOTE
DATE:    

 

SUBJECTIVE:  The patient is less confused.  He does have a Gutierrez catheter in because of

urinary retention. 

 

PHYSICAL EXAMINATION:

VITAL SIGNS:  Blood pressure is 149/73, saturation is 98% and the pulse is 87. 

HEENT:  Shows no facial swelling or erythema. 

LYMPHATIC:  Shows no submandibular, cervical, or supraclavicular adenopathy. 

CARDIAC:  Reveals a regular rate and rhythm with normal S1 and S2. 

LUNGS:  Auscultation of lungs reveals decreased breath sounds at the bases.  There is no

wheezing. 

ABDOMEN:  Soft and nontender.  There is no rebound or guarding. 

EXTREMITIES:  Shows no leg or calf tenderness.

LABORATORY DATA:  White blood cell count is 7.5, hemoglobin is 12.5, and platelet count

is 282.  The BUN to creatinine ratio is 12 to 0.73.  Other electrolytes are within

normal limits. 

 

IMPRESSION:  

1. Metabolic encephalopathy.

2. Urinary retention.

3. Chronic pulmonary fibrosis.

4. 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

 

PLAN:  

1. Complete antibiotics.

2. Continue fluid restriction.

3. Arrange for disposition.

4. The patient will return to SNF with Gutierrez catheter in place.

 

 

 

 

______________________________

Jonnie Walters MD

 

[facility]

D:  10/20/2020 11:03:57

T:  10/20/2020 11:14:56

Job #:  [id number]

## 2020-10-20 NOTE — NUR
IM- progress note

O/N see below



ROS: unreliable



v/s revd

PE

tired appearing

anicteric

ns1s2

REDUCED BS

soft nt nd

no e/t

 skin dry

flat affect

awake; oriented to self

phipps



labs/meds revd



A/P

Multifocal PNA- IV abx; f/u COVID testing.

Hyponatremia- check urine/serum osm and urine Na.   fluid restrict

VIKY/AMS- due to hyponatremia

Pulmonary fibrosis- chronic; supportive

Prop: scd; pepcid

dipso: f/u Na. cct>35mins



10-19 Na improving; f/u urine Osm; cct>35mins

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  F/U urine Osm.  Serum Na better; 
d/c planning; 



PARDEEP BUSH MD, PHD.

## 2020-10-20 NOTE — NUR
Dr Reyes has been in with pt. Discussed care.  Dr Reyes has determined to remove pt from 
restraints.  Will monitor.

## 2020-10-20 NOTE — NUR
CALLED AND SPOKE WITH OTIS ABOUT SNF, SHE STATES HER COUSIN WORKS AT [facility] AND 
PREFERS TO GO THERE. EDUCATED ABOUT IMM FILED CHOICE AND IMM IN CHART.

## 2020-10-20 NOTE — NUR
RECEIVED CALL FROM [facility], THEY ARE NOT IN NETWORK. CALLED OTIS LET HER KNOW 
AND ASKED WHERE TO TRY NEXT, SHE AGREED TO [facility] AND IF NOT THEM THEN [facility] 
[facility].

## 2020-10-21 VITALS — SYSTOLIC BLOOD PRESSURE: 115 MMHG | DIASTOLIC BLOOD PRESSURE: 53 MMHG

## 2020-10-21 VITALS — SYSTOLIC BLOOD PRESSURE: 159 MMHG | DIASTOLIC BLOOD PRESSURE: 100 MMHG

## 2020-10-21 VITALS — DIASTOLIC BLOOD PRESSURE: 50 MMHG | SYSTOLIC BLOOD PRESSURE: 102 MMHG

## 2020-10-21 VITALS — DIASTOLIC BLOOD PRESSURE: 73 MMHG | SYSTOLIC BLOOD PRESSURE: 138 MMHG

## 2020-10-21 VITALS — DIASTOLIC BLOOD PRESSURE: 67 MMHG | SYSTOLIC BLOOD PRESSURE: 140 MMHG

## 2020-10-21 VITALS — DIASTOLIC BLOOD PRESSURE: 83 MMHG | SYSTOLIC BLOOD PRESSURE: 168 MMHG

## 2020-10-21 VITALS — SYSTOLIC BLOOD PRESSURE: 136 MMHG | DIASTOLIC BLOOD PRESSURE: 62 MMHG

## 2020-10-21 VITALS — SYSTOLIC BLOOD PRESSURE: 139 MMHG | DIASTOLIC BLOOD PRESSURE: 69 MMHG

## 2020-10-21 VITALS — DIASTOLIC BLOOD PRESSURE: 72 MMHG | SYSTOLIC BLOOD PRESSURE: 143 MMHG

## 2020-10-21 VITALS — SYSTOLIC BLOOD PRESSURE: 150 MMHG | DIASTOLIC BLOOD PRESSURE: 78 MMHG

## 2020-10-21 VITALS — DIASTOLIC BLOOD PRESSURE: 104 MMHG | SYSTOLIC BLOOD PRESSURE: 165 MMHG

## 2020-10-21 VITALS — DIASTOLIC BLOOD PRESSURE: 66 MMHG | SYSTOLIC BLOOD PRESSURE: 146 MMHG

## 2020-10-21 VITALS — DIASTOLIC BLOOD PRESSURE: 79 MMHG | SYSTOLIC BLOOD PRESSURE: 166 MMHG

## 2020-10-21 VITALS — DIASTOLIC BLOOD PRESSURE: 72 MMHG | SYSTOLIC BLOOD PRESSURE: 152 MMHG

## 2020-10-21 VITALS — DIASTOLIC BLOOD PRESSURE: 69 MMHG | SYSTOLIC BLOOD PRESSURE: 125 MMHG

## 2020-10-21 VITALS — DIASTOLIC BLOOD PRESSURE: 78 MMHG | SYSTOLIC BLOOD PRESSURE: 173 MMHG

## 2020-10-21 VITALS — DIASTOLIC BLOOD PRESSURE: 88 MMHG | SYSTOLIC BLOOD PRESSURE: 164 MMHG

## 2020-10-21 VITALS — SYSTOLIC BLOOD PRESSURE: 140 MMHG | DIASTOLIC BLOOD PRESSURE: 67 MMHG

## 2020-10-21 VITALS — SYSTOLIC BLOOD PRESSURE: 165 MMHG | DIASTOLIC BLOOD PRESSURE: 78 MMHG

## 2020-10-21 VITALS — DIASTOLIC BLOOD PRESSURE: 78 MMHG | SYSTOLIC BLOOD PRESSURE: 150 MMHG

## 2020-10-21 VITALS — DIASTOLIC BLOOD PRESSURE: 76 MMHG | SYSTOLIC BLOOD PRESSURE: 157 MMHG

## 2020-10-21 VITALS — SYSTOLIC BLOOD PRESSURE: 156 MMHG | DIASTOLIC BLOOD PRESSURE: 82 MMHG

## 2020-10-21 VITALS — SYSTOLIC BLOOD PRESSURE: 117 MMHG | DIASTOLIC BLOOD PRESSURE: 64 MMHG

## 2020-10-21 VITALS — SYSTOLIC BLOOD PRESSURE: 119 MMHG | DIASTOLIC BLOOD PRESSURE: 53 MMHG

## 2020-10-21 VITALS — DIASTOLIC BLOOD PRESSURE: 62 MMHG | SYSTOLIC BLOOD PRESSURE: 139 MMHG

## 2020-10-21 RX ADMIN — Medication SCH MG: at 07:44

## 2020-10-21 RX ADMIN — TAMSULOSIN HYDROCHLORIDE SCH MG: 0.4 CAPSULE ORAL at 17:56

## 2020-10-21 RX ADMIN — Medication SCH MLS/HR: at 18:00

## 2020-10-21 RX ADMIN — ENOXAPARIN SODIUM SCH MG: 40 INJECTION SUBCUTANEOUS at 17:30

## 2020-10-21 RX ADMIN — BUDESONIDE AND FORMOTEROL FUMARATE DIHYDRATE SCH EA: 160; 4.5 AEROSOL RESPIRATORY (INHALATION) at 07:12

## 2020-10-21 RX ADMIN — CEFTRIAXONE SCH MLS/HR: 100 INJECTION, POWDER, FOR SOLUTION INTRAVENOUS at 12:45

## 2020-10-21 RX ADMIN — DEMECLOCYCLINE HYDROCHLORIDE SCH MG: 300 TABLET ORAL at 05:59

## 2020-10-21 RX ADMIN — BUDESONIDE AND FORMOTEROL FUMARATE DIHYDRATE SCH EA: 160; 4.5 AEROSOL RESPIRATORY (INHALATION) at 19:15

## 2020-10-21 RX ADMIN — Medication PRN MG: at 17:31

## 2020-10-21 NOTE — DIAGNOSTIC IMAGING REPORT
MRI BRAIN WO



HISTORY: Altered mental status



COMPARISON:  Head CT 10/17/2020 and 9/23/2020



TECHNIQUE: 

Sagittal T2, axial T2, multiplanar 3-D T1, axial T2/FLAIR, axial gradient echo

(or susceptibility weighted), and axial diffusion weighted MR images of the

brain were obtained without contrast. Motion artifacts obscure some details.



DISCUSSION:



Scalp/bone marrow: Unremarkable.

Brain sulci: Prominent.

Ventricles:  Compensatory dilatation.

Extra-axial spaces: No masses or fluid collections.



Parenchyma:

Scattered T2/FLAIR hyperintense foci throughout the supratentorial white matter

are likely chronic microvascular ischemic changes.

Old lacunar infarcts are seen in the left thalamus and bilateral putamen.

Otherwise, no mass, hemorrhage, or acute vascular insults.



Vessels: Dolichoectasia an the anterior and posterior circulation.

Sellar/Suprasellar region:  No abnormalities.

Craniocervical junction: No abnormalities.

Incidental findings: Bilateral ocular lens replacement.



IMPRESSION:

 

1.  No acute intracranial abnormalities.

2.  Moderate supratentorial chronic microvascular ischemic change with

scattered old lacunar infarcts.

3.  Mild generalized cerebral volume loss.



Signed by: Dr. Gunnar Ypa M.D. on 10/21/2020 5:45 PM

## 2020-10-21 NOTE — NUR
IM- progress note

O/N see below



ROS: unreliable



v/s revd

PE

tired appearing

anicteric

ns1s2

REDUCED BS

soft nt nd

no e/t

 skin dry

flat affect

awake; oriented to self

phipps



labs/meds revd



A/P

Multifocal PNA- IV abx; f/u COVID testing.

Hyponatremia- check urine/serum osm and urine Na.   fluid restrict

VIKY/AMS- due to hyponatremia

Pulmonary fibrosis- chronic; supportive

Prop: scd; pepcid

dipso: f/u Na. cct>35mins



10-19 Na improving; f/u urine Osm; cct>35mins

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  F/U urine Osm.  Serum Na better; 
d/c planning;

10-21  ready for SNF



PARDEEP BUSH MD, PHD.

## 2020-10-21 NOTE — PROGRESS NOTE
DATE:    

 

SUBJECTIVE:  The patient is less confused today.  He is not having fevers.

 

PHYSICAL EXAMINATION:

VITAL SIGNS:  The blood pressure is 125/69, saturation is 100%, and the pulse is 80. 

HEENT:  Shows no facial swelling or erythema. 

LYMPHATIC:  Shows no submandibular, cervical, or supraclavicular adenopathy. 

CARDIAC:  Reveals a regular rate and rhythm with normal S1 and S2. 

LUNGS:  Auscultation of lungs reveals clear breath sounds bilaterally.  There is no

wheezing. 

ABDOMEN:  Soft and nontender.  There is no rebound or guarding. 

EXTREMITIES:  Show no leg edema or calf tenderness.

LABORATORY DATA:  White blood cell count is 7.5 and hemoglobin is 12.5.  The platelet

count is 282.  The BUN to creatinine ratio is 12 to 0.73.  The other electrolytes are

within normal limits.  The albumin is 3.2. 

 

IMPRESSION:  

1. Metabolic encephalopathy.

2. History of vascular dementia.

3. Urinary retention.

4. Pulmonary fibrosis.

5. Syndrome of inappropriate antidiuretic hormone.

 

PLAN:  

1. The patient to have MRI today.

2. Neurology evaluation.

3. Continue fluid restriction and treatment for SIADH.

 

 

 

 

______________________________

Jonnie Walters MD

 

[facility]

D:  10/21/2020 11:49:17

T:  10/21/2020 12:04:52

Job #:  [id number]

## 2020-10-22 VITALS — SYSTOLIC BLOOD PRESSURE: 162 MMHG | DIASTOLIC BLOOD PRESSURE: 89 MMHG

## 2020-10-22 VITALS — SYSTOLIC BLOOD PRESSURE: 161 MMHG | DIASTOLIC BLOOD PRESSURE: 73 MMHG

## 2020-10-22 VITALS — SYSTOLIC BLOOD PRESSURE: 164 MMHG | DIASTOLIC BLOOD PRESSURE: 73 MMHG

## 2020-10-22 VITALS — DIASTOLIC BLOOD PRESSURE: 68 MMHG | SYSTOLIC BLOOD PRESSURE: 165 MMHG

## 2020-10-22 VITALS — DIASTOLIC BLOOD PRESSURE: 75 MMHG | SYSTOLIC BLOOD PRESSURE: 161 MMHG

## 2020-10-22 VITALS — SYSTOLIC BLOOD PRESSURE: 150 MMHG | DIASTOLIC BLOOD PRESSURE: 76 MMHG

## 2020-10-22 VITALS — DIASTOLIC BLOOD PRESSURE: 85 MMHG | SYSTOLIC BLOOD PRESSURE: 172 MMHG

## 2020-10-22 VITALS — SYSTOLIC BLOOD PRESSURE: 130 MMHG | DIASTOLIC BLOOD PRESSURE: 66 MMHG

## 2020-10-22 VITALS — SYSTOLIC BLOOD PRESSURE: 166 MMHG | DIASTOLIC BLOOD PRESSURE: 71 MMHG

## 2020-10-22 VITALS — SYSTOLIC BLOOD PRESSURE: 146 MMHG | DIASTOLIC BLOOD PRESSURE: 78 MMHG

## 2020-10-22 VITALS — DIASTOLIC BLOOD PRESSURE: 73 MMHG | SYSTOLIC BLOOD PRESSURE: 161 MMHG

## 2020-10-22 VITALS — SYSTOLIC BLOOD PRESSURE: 169 MMHG | DIASTOLIC BLOOD PRESSURE: 73 MMHG

## 2020-10-22 VITALS — SYSTOLIC BLOOD PRESSURE: 167 MMHG | DIASTOLIC BLOOD PRESSURE: 75 MMHG

## 2020-10-22 VITALS — SYSTOLIC BLOOD PRESSURE: 170 MMHG | DIASTOLIC BLOOD PRESSURE: 76 MMHG

## 2020-10-22 VITALS — SYSTOLIC BLOOD PRESSURE: 172 MMHG | DIASTOLIC BLOOD PRESSURE: 85 MMHG

## 2020-10-22 VITALS — DIASTOLIC BLOOD PRESSURE: 77 MMHG | SYSTOLIC BLOOD PRESSURE: 168 MMHG

## 2020-10-22 LAB
ANION GAP SERPL CALC-SCNC: 14.1 MMOL/L (ref 8–16)
BASOPHILS # BLD AUTO: 0.1 10*3/UL (ref 0–0.1)
BASOPHILS NFR BLD AUTO: 1.5 % (ref 0–1)
BUN SERPL-MCNC: 17 MG/DL (ref 7–26)
BUN/CREAT SERPL: 26 (ref 6–25)
CALCIUM SERPL-MCNC: 9.1 MG/DL (ref 8.4–10.2)
CHLORIDE SERPL-SCNC: 105 MMOL/L (ref 98–107)
CO2 SERPL-SCNC: 23 MMOL/L (ref 22–29)
DEPRECATED NEUTROPHILS # BLD AUTO: 4.4 10*3/UL (ref 2.1–6.9)
EGFRCR SERPLBLD CKD-EPI 2021: > 60 ML/MIN (ref 60–?)
EOSINOPHIL # BLD AUTO: 0.4 10*3/UL (ref 0–0.4)
EOSINOPHIL NFR BLD AUTO: 5.1 % (ref 0–6)
ERYTHROCYTE [DISTWIDTH] IN CORD BLOOD: 13.1 % (ref 11.7–14.4)
GLUCOSE SERPLBLD-MCNC: 100 MG/DL (ref 74–118)
HCT VFR BLD AUTO: 37 % (ref 38.2–49.6)
HGB BLD-MCNC: 12.6 G/DL (ref 14–18)
LYMPHOCYTES # BLD: 1.2 10*3/UL (ref 1–3.2)
LYMPHOCYTES NFR BLD AUTO: 17.4 % (ref 18–39.1)
MCH RBC QN AUTO: 31.5 PG (ref 28–32)
MCHC RBC AUTO-ENTMCNC: 34.1 G/DL (ref 31–35)
MCV RBC AUTO: 92.5 FL (ref 81–99)
MONOCYTES # BLD AUTO: 0.9 10*3/UL (ref 0.2–0.8)
MONOCYTES NFR BLD AUTO: 12.9 % (ref 4.4–11.3)
NEUTS SEG NFR BLD AUTO: 62.5 % (ref 38.7–80)
PLATELET # BLD AUTO: 312 X10E3/UL (ref 140–360)
POTASSIUM SERPL-SCNC: 4.1 MMOL/L (ref 3.5–5.1)
RBC # BLD AUTO: 4 X10E6/UL (ref 4.3–5.7)
SODIUM SERPL-SCNC: 138 MMOL/L (ref 136–145)

## 2020-10-22 RX ADMIN — Medication SCH MLS/HR: at 05:36

## 2020-10-22 RX ADMIN — ENOXAPARIN SODIUM SCH MG: 40 INJECTION SUBCUTANEOUS at 17:49

## 2020-10-22 RX ADMIN — Medication SCH MLS/HR: at 00:00

## 2020-10-22 RX ADMIN — Medication SCH MLS/HR: at 11:18

## 2020-10-22 RX ADMIN — BUDESONIDE AND FORMOTEROL FUMARATE DIHYDRATE SCH EA: 160; 4.5 AEROSOL RESPIRATORY (INHALATION) at 20:30

## 2020-10-22 RX ADMIN — DEMECLOCYCLINE HYDROCHLORIDE SCH MG: 300 TABLET ORAL at 05:36

## 2020-10-22 RX ADMIN — TAMSULOSIN HYDROCHLORIDE SCH MG: 0.4 CAPSULE ORAL at 21:00

## 2020-10-22 RX ADMIN — BUDESONIDE AND FORMOTEROL FUMARATE DIHYDRATE SCH EA: 160; 4.5 AEROSOL RESPIRATORY (INHALATION) at 09:00

## 2020-10-22 RX ADMIN — Medication SCH MLS/HR: at 23:00

## 2020-10-22 RX ADMIN — Medication SCH MLS/HR: at 18:22

## 2020-10-22 NOTE — ELECTROENCEPHALOGRAM
DATE OF STUDY:  

 

REQUESTING PHYSICIAN:  

 

STUDY:  30-minute EEG.

 

INDICATION FOR PROCEDURE:  EEG done to evaluate for delirium __________

 

EEG DATA:  EEG is recorded on 10-20 international electrode placement system with

bipolar and transverse montages. 

 

EEG data shows diffuse slow waves, theta frequency about 5-7 Hz __________ 

 

No acute __________ is noted. 

 

EEG is reactive to stimulation and eye opening and closure. 

 

Posterior dominant rhythm is poorly formed, but notable 7 x 10 seconds. 

 

Overall, this EEG is abnormal level 2 for moderate to severe diffuse encephalopathy

without evidence of epilepsy or seizure activity. 

 

 

 

 

______________________________

[facility]

D:  10/22/2020 21:24:15

T:  10/22/2020 22:49:55

Job #:  [id number]

## 2020-10-22 NOTE — NUR
Received patient from ICU. Respiration even and unlabored without SOB. Denies pain at this 
time. Patient is advised to use the call light if assistance needed. Call light in reach. 
Patient orientated to room and how to use the bed and call light.

## 2020-10-22 NOTE — PROGRESS NOTE
DATE:    

 

SUBJECTIVE:  The patient's urine is growing out Enterococcus.  He was switched to

ampicillin yesterday. 

 

He still has some confusion. 

 

His Gutierrez catheter was removed yesterday.

 

PHYSICAL EXAMINATION:

VITAL SIGNS:  The blood pressure is 172/85, saturation is 98%, and respiratory rate is

17.  The pulse is 105. 

HEENT:  Shows no facial swelling or erythema. 

LYMPHATIC:  Shows no submandibular, cervical, or supraclavicular adenopathy. 

CARDIAC:  Reveals regular rate and rhythm with normal S1, S2. 

LUNGS:  Auscultation of lungs reveals rhonchorous breath sounds bilaterally.  There is

no wheezing. 

ABDOMEN:  Soft and nontender.  There is no rebound or guarding. 

EXTREMITIES:  Shows no leg edema or calf tenderness.

LABORATORY DATA:  BUN-to-creatinine ratio is normal.  Other electrolytes are within

normal limits.  The white blood cell count is 7.1, hemoglobin is 12.6, and the platelet

count is 312. 

 

RADIOGRAPHIC DATA:  MRI of the brain shows moderate supratentorial chronic microvascular

ischemic changes with scattered old lacunar infarcts. 

 

IMPRESSION:  

1. Metabolic encephalopathy.

2. Underlying vascular dementia.

3. Urinary retention.

4. Pulmonary fibrosis.

5. Urinary tract infection with Enterococcus.

 

PLAN:  

1. Continue current inhalers.

2. Wean off oxygen.

3. Complete p.o. course of ampicillin for urinary tract infection.

4. Possible transfer to skilled nursing facility.

 

 

 

 

______________________________

Jonnie Walters MD

 

[facility]

D:  10/22/2020 10:16:35

T:  10/22/2020 11:47:21

Job #:  [id number]

## 2020-10-22 NOTE — NUR
IM- progress note

O/N see below



ROS: unreliable



v/s revd

PE

tired appearing

anicteric

ns1s2

REDUCED BS

soft nt nd

no e/t

 skin dry

flat affect

awake; oriented to self

phipps



labs/meds revd



A/P

Multifocal PNA- IV abx; f/u COVID testing.

Hyponatremia- check urine/serum osm and urine Na.   fluid restrict

VIKY/AMS- due to hyponatremia

Pulmonary fibrosis- chronic; supportive

Prop: scd; pepcid

dipso: f/u Na. cct>35mins



10-19 Na improving; f/u urine Osm; cct>35mins

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  F/U urine Osm.  Serum Na better; 
d/c planning;

10-21  ready for SNF

10-22 Enterococcal UTI- sensitive to ampicillin; VIKY/AMS due to UTI.  MRI brain 
negative; control BP; SNF pending; 



PARDEEP BUSH MD, PHD.

## 2020-10-22 NOTE — CONSULTATION
DATE OF CONSULTATION:  10/22/2020

 

Neurology Consultation 

 

REASON FOR CONSULTATION:  Delirium and hyponatremia.

 

HISTORY OF PRESENT ILLNESS:  Mr. Seaman is a 74-year-old male with hyponatremia,

__________ confusion, delirium and sundowning, has pneumonia noted on x-ray. 

 

The patient is too confused to provide history, past medical history, review of systems,

and social history. 

 

PHYSICAL EXAMINATION:

VITAL SIGNS:  Show that he is afebrile.  His blood pressure is 136/72, heart rate is 88

and regular. 

GENERAL:  The patient is awake, responsive, follows commands.  Speaks Yi. 

HEENT:  Extraocular muscles intact.  Face symmetric.  Tongue is midline.  No nuchal

rigidity is noted.  Trachea is midline. 

CARDIOVASCULAR:  Regular rate and rhythm. 

PULMONARY:  Clear to auscultation with some rhonchi in the upper lobes bilaterally. 

ABDOMEN:  Soft and nontender. 

NEUROLOGIC:  Moves bilateral upper and lower extremities 4/5.  Reflexes symmetric.

There is no ataxia or tremors. 

ASSESSMENT AND PLAN:  The patient comes to my attention for recurrent delirium and

confusion, likely he has underlying dementia and needs to be evaluated with no evidence

of acute stroke. 

 

Recommendations include treat underlying metabolic response and EEG will be reviewed,

which was done.  No need for an MRI at this time.  PT, OT, rehab evaluations are

recommended. 

 

Overall, no evidence of acute neurological issues secondary to delirium, secondary to

hyponatremia, metabolic etiology.  I would recommend evaluation by Nephrology or

Endocrine.  However, once the patient is stable, can do outpatient dementia workup as

well. 

 

 

 

 

______________________________

[facility]

D:  10/22/2020 21:21:53

T:  10/22/2020 22:15:01

Job #:  [id number]

## 2020-10-23 VITALS — SYSTOLIC BLOOD PRESSURE: 159 MMHG | DIASTOLIC BLOOD PRESSURE: 76 MMHG

## 2020-10-23 VITALS — SYSTOLIC BLOOD PRESSURE: 164 MMHG | DIASTOLIC BLOOD PRESSURE: 78 MMHG

## 2020-10-23 VITALS — DIASTOLIC BLOOD PRESSURE: 70 MMHG | SYSTOLIC BLOOD PRESSURE: 155 MMHG

## 2020-10-23 VITALS — DIASTOLIC BLOOD PRESSURE: 77 MMHG | SYSTOLIC BLOOD PRESSURE: 142 MMHG

## 2020-10-23 VITALS — DIASTOLIC BLOOD PRESSURE: 78 MMHG | SYSTOLIC BLOOD PRESSURE: 164 MMHG

## 2020-10-23 VITALS — DIASTOLIC BLOOD PRESSURE: 85 MMHG | SYSTOLIC BLOOD PRESSURE: 159 MMHG

## 2020-10-23 RX ADMIN — BUDESONIDE AND FORMOTEROL FUMARATE DIHYDRATE SCH EA: 160; 4.5 AEROSOL RESPIRATORY (INHALATION) at 07:00

## 2020-10-23 RX ADMIN — DEMECLOCYCLINE HYDROCHLORIDE SCH MG: 300 TABLET ORAL at 05:28

## 2020-10-23 RX ADMIN — Medication SCH MLS/HR: at 12:16

## 2020-10-23 RX ADMIN — Medication SCH MLS/HR: at 05:28

## 2020-10-23 NOTE — NUR
IM- progress note

O/N see below



ROS: unreliable



v/s revd

PE

tired appearing

anicteric

ns1s2

REDUCED BS

soft nt nd

no e/t

 skin dry

flat affect

awake; oriented to self

phipps



labs/meds revd



A/P

Multifocal PNA- IV abx; f/u COVID testing.

Hyponatremia- check urine/serum osm and urine Na.   fluid restrict

VIKY/AMS- due to hyponatremia

Pulmonary fibrosis- chronic; supportive

Prop: scd; pepcid

dipso: f/u Na. cct>35mins



10-19 Na improving; f/u urine Osm; cct>35mins

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  F/U urine Osm.  Serum Na better; 
d/c planning;

10-21  ready for SNF

10-22 Enterococcal UTI- sensitive to ampicillin; VIKY/AMS due to UTI.  MRI brain 
negative; control BP; SNF pending; 

10-23 control BP; d/c planning to SNF. d/c when approved.



PARDEEP BUSH MD, PHD.

## 2020-10-23 NOTE — NUR
report called to Aimee Ruano RN in [facility], patient sitting up in recliner, IV 
canula removed with tip intact, no ss of infiltration noted. notified family , Dr Reyes 
finalized discharge medications

## 2020-10-23 NOTE — NUR
D/C Summary



Principal dx:

Multifocal PNA- IV abx; f/u COVID testing.

Hyponatremia- check urine/serum osm and urine Na.   fluid restrict

VIKY/AMS- due to hyponatremia

Pulmonary fibrosis- chronic; supportive

SIADH

Enterococcal UTI



Secondary dx:

Prop: scd; pepcid

dipso: f/u Na. cct>35mins



10-19 Na improving; f/u urine Osm; cct>35mins

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  F/U urine Osm.  Serum Na better; 
d/c planning;

10-21  ready for SNF

10-22 Enterococcal UTI- sensitive to ampicillin; VIKY/AMS due to UTI.  MRI brain 
negative; control BP; SNF pending; 

10-23 control BP; d/c planning to SNF. d/c when approved.



d/c to SNF

stable

f/u pcp 1 week

d/c>35mins



PARDEEP BUSH MD, PHD

## 2020-10-23 NOTE — NUR
awake



99   92   159/76



eomi

perrl

no nuchal rigidity

rrr

mild rhonchi

abd soft

speech fluent- Czech

moves all 4 ext 4/5

reflexes 1/4

no ataxia



a/p

delirium due to infectious etiology, no encephalitis or meningitis suspected

imaging reasurring 

tx underlying hyponatremia

outpt eval for dementia when patient clinically at baseline

## 2020-10-23 NOTE — NUR
Patient up ambulating in room. Patient AAOx2-3 with periods of confusion. Denies pain at 
this time. Continue monitor.

## 2020-10-23 NOTE — NUR
SKILLED NURSING FACILITY DISCHARGE INFORMATION 

PATIENT HAS BEEN ACCEPTED TO: 

NAME: [facility]

ADDRESS:[address] [place]

ACCEPTING :BONNY CHARLES

ACCEPTING MD:ELEAZAR

ROOM:[id number]

NURSE CALL REPORT TO: [phone]

IMM SIGNED AND OBTAINED (if applicable): IMM

THE FOLLOWING DOCUMENTS MUST ACCOMPANY PATIENT FOR TRANSFER:

COPIED CHART:PACKET

## 2020-10-23 NOTE — NUR
[facility] to [facility], Alert with no distress, 
all belongings with patient

## 2024-06-12 ENCOUNTER — HOSPITAL ENCOUNTER (INPATIENT)
Dept: HOSPITAL 88 - ER | Age: 79
LOS: 3 days | Discharge: HOME | DRG: 177 | End: 2024-06-15
Attending: INTERNAL MEDICINE | Admitting: INTERNAL MEDICINE
Payer: MEDICARE

## 2024-06-12 VITALS
TEMPERATURE: 97.8 F | RESPIRATION RATE: 18 BRPM | OXYGEN SATURATION: 100 % | HEART RATE: 79 BPM | DIASTOLIC BLOOD PRESSURE: 84 MMHG | SYSTOLIC BLOOD PRESSURE: 129 MMHG

## 2024-06-12 VITALS — HEART RATE: 59 BPM

## 2024-06-12 VITALS — HEIGHT: 67 IN | BODY MASS INDEX: 24.8 KG/M2 | TEMPERATURE: 97.8 F | WEIGHT: 158 LBS

## 2024-06-12 DIAGNOSIS — R00.1: ICD-10-CM

## 2024-06-12 DIAGNOSIS — I44.0: ICD-10-CM
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
## 2024-06-12 DIAGNOSIS — E78.5: ICD-10-CM

## 2024-06-12 DIAGNOSIS — Z11.52: ICD-10-CM

## 2024-06-12 DIAGNOSIS — Z79.82: ICD-10-CM

## 2024-06-12 DIAGNOSIS — F03.911: ICD-10-CM

## 2024-06-12 DIAGNOSIS — I11.0: ICD-10-CM

## 2024-06-12 DIAGNOSIS — F05: ICD-10-CM

## 2024-06-12 DIAGNOSIS — I50.33: ICD-10-CM
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
## 2024-06-12 DIAGNOSIS — E87.1: ICD-10-CM

## 2024-06-12 DIAGNOSIS — Z79.899: ICD-10-CM

## 2024-06-12 DIAGNOSIS — J69.0: Primary | ICD-10-CM

## 2024-06-12 DIAGNOSIS — E87.8: ICD-10-CM

## 2024-06-12 LAB
ALBUMIN SERPL-MCNC: 3.6 G/DL (ref 3.5–5)
ALBUMIN/GLOB SERPL: 1.1 {RATIO} (ref 0.8–2)
ALP SERPL-CCNC: 83 IU/L (ref 40–150)
ALT SERPL-CCNC: 13 IU/L (ref 0–55)
ANION GAP SERPL CALC-SCNC: 10.2 MMOL/L (ref 8–16)
BACTERIA URNS QL MICRO: (no result) /HPF
BASOPHILS # BLD AUTO: 0 10*3/UL (ref 0–0.1)
BASOPHILS NFR BLD AUTO: 0.3 % (ref 0–1)
BILIRUB SERPL-MCNC: 0.3 MG/DL (ref 0.2–1.2)
BILIRUB UR QL: NEGATIVE
BUN SERPL-MCNC: 10 MG/DL (ref 7–26)
BUN SERPL-MCNC: 9 MG/DL (ref 7–26)
BUN/CREAT SERPL: 14 (ref 6–25)
CALCIUM SERPL-MCNC: 8.9 MG/DL (ref 8–10.3)
CHLORIDE SERPL-SCNC: 91 MMOL/L (ref 101–111)
CK SERPL-CCNC: 190 IU/L (ref 30–200)
CLARITY UR: CLEAR
CO2 SERPL-SCNC: 27 MMOL/L (ref 18–33)
COLOR UR: YELLOW
DEPRECATED APTT PLAS QN: 29.4 SECONDS (ref 23.8–35.5)
DEPRECATED INR PLAS: 0.97
DEPRECATED NEUTROPHILS # BLD AUTO: 5.2 10*3/UL (ref 2.1–6.9)
DEPRECATED RBC URNS MANUAL-ACNC: (no result) /HPF (ref 0–5)
EGFRCR SERPLBLD CKD-EPI 2021: 94 ML/MIN (ref 60–?)
EOSINOPHIL # BLD AUTO: 0 10*3/UL (ref 0–0.4)
EOSINOPHIL NFR BLD AUTO: 0.2 % (ref 0–6)
EPI CELLS URNS QL MICRO: (no result) /LPF
ERYTHROCYTE [DISTWIDTH] IN CORD BLOOD: 12.5 % (ref 11.7–14.4)
GLOBULIN PLAS-MCNC: 3.2 G/DL (ref 2.3–3.5)
GLUCOSE SERPLBLD-MCNC: 114 MG/DL (ref 74–118)
GLUCOSE SERPLBLD-MCNC: 116 MG/DL (ref 74–118)
GLUCOSE UR QL STRIP.AUTO: NEGATIVE
HCT VFR BLD AUTO: 32.1 % (ref 38.2–49.6)
HGB BLD-MCNC: 11.6 G/DL (ref 14–18)
KETONES UR QL STRIP.AUTO: NEGATIVE
LEUKOCYTE ESTERASE UR QL STRIP.AUTO: NEGATIVE
LYMPHOCYTES # BLD: 0.6 10*3/UL (ref 1–3.2)
LYMPHOCYTES NFR BLD AUTO: 9.8 % (ref 18–39.1)
MAGNESIUM SERPL-MCNC: 1.8 MG/DL (ref 1.3–2.1)
MCH RBC QN AUTO: 32.3 PG (ref 28–32)
MCHC RBC AUTO-ENTMCNC: 36.1 G/DL (ref 31–35)
MCV RBC AUTO: 89.4 FL (ref 81–99)
MONOCYTES # BLD AUTO: 0.4 10*3/UL (ref 0.2–0.8)
MONOCYTES NFR BLD AUTO: 6.6 % (ref 4.4–11.3)
MUCOUS THREADS URNS QL MICRO: (no result)
NEUTS SEG NFR BLD AUTO: 82.6 % (ref 38.7–80)
NITRITE UR QL STRIP.AUTO: NEGATIVE
PH UR STRIP.AUTO: 6.5 [PH] (ref 5–7)
PLATELET # BLD AUTO: 201 X10E3/UL (ref 140–360)
POTASSIUM SERPL-SCNC: 4.2 MMOL/L (ref 3–5.1)
PROT SERPL-MCNC: 6.8 G/DL (ref 6.5–8.1)
PROT UR QL STRIP.AUTO: (no result)
PROTHROMBIN TIME: 13.6 SECONDS (ref 11.9–14.5)
RBC # BLD AUTO: 3.59 X10E6/UL (ref 4.3–5.7)
SODIUM SERPL-SCNC: 124 MMOL/L (ref 128–145)
SP GR UR STRIP: 1.02 (ref 1.01–1.02)
TROPONIN I SERPL DL<=0.01 NG/ML-MCNC: 0 NG/ML (ref 0–0.3)
TSH SERPL DL<=0.005 MIU/L-ACNC: 0.3 UIU/ML (ref 0.35–4.94)
UROBILINOGEN UR STRIP-MCNC: 0.2 MG/DL (ref 0.2–1)
WBC # BLD: 6.34 X10E3/UL (ref 4.8–10.8)
WBC #/AREA URNS HPF: (no result) /HPF (ref 0–5)

## 2024-06-12 PROCEDURE — 84295 ASSAY OF SERUM SODIUM: CPT

## 2024-06-12 PROCEDURE — 70450 CT HEAD/BRAIN W/O DYE: CPT

## 2024-06-12 PROCEDURE — 81001 URINALYSIS AUTO W/SCOPE: CPT

## 2024-06-12 PROCEDURE — 99284 EMERGENCY DEPT VISIT MOD MDM: CPT

## 2024-06-12 PROCEDURE — 82550 ASSAY OF CK (CPK): CPT

## 2024-06-12 PROCEDURE — 83935 ASSAY OF URINE OSMOLALITY: CPT

## 2024-06-12 PROCEDURE — 84300 ASSAY OF URINE SODIUM: CPT

## 2024-06-12 PROCEDURE — 36415 COLL VENOUS BLD VENIPUNCTURE: CPT

## 2024-06-12 PROCEDURE — 93005 ELECTROCARDIOGRAM TRACING: CPT

## 2024-06-12 PROCEDURE — 80048 BASIC METABOLIC PNL TOTAL CA: CPT

## 2024-06-12 PROCEDURE — 85730 THROMBOPLASTIN TIME PARTIAL: CPT

## 2024-06-12 PROCEDURE — 85610 PROTHROMBIN TIME: CPT

## 2024-06-12 PROCEDURE — 82947 ASSAY GLUCOSE BLOOD QUANT: CPT

## 2024-06-12 PROCEDURE — 99252 IP/OBS CONSLTJ NEW/EST SF 35: CPT

## 2024-06-12 PROCEDURE — 93306 TTE W/DOPPLER COMPLETE: CPT

## 2024-06-12 PROCEDURE — 83735 ASSAY OF MAGNESIUM: CPT

## 2024-06-12 PROCEDURE — 87040 BLOOD CULTURE FOR BACTERIA: CPT

## 2024-06-12 PROCEDURE — 80053 COMPREHEN METABOLIC PANEL: CPT

## 2024-06-12 PROCEDURE — 80061 LIPID PANEL: CPT

## 2024-06-12 PROCEDURE — 84520 ASSAY OF UREA NITROGEN: CPT

## 2024-06-12 PROCEDURE — 84443 ASSAY THYROID STIM HORMONE: CPT

## 2024-06-12 PROCEDURE — 85025 COMPLETE CBC W/AUTO DIFF WBC: CPT

## 2024-06-12 PROCEDURE — 83880 ASSAY OF NATRIURETIC PEPTIDE: CPT

## 2024-06-12 PROCEDURE — 87086 URINE CULTURE/COLONY COUNT: CPT

## 2024-06-12 PROCEDURE — 72125 CT NECK SPINE W/O DYE: CPT

## 2024-06-12 PROCEDURE — 87400 INFLUENZA A/B EACH AG IA: CPT

## 2024-06-12 PROCEDURE — 84484 ASSAY OF TROPONIN QUANT: CPT

## 2024-06-12 PROCEDURE — 71045 X-RAY EXAM CHEST 1 VIEW: CPT

## 2024-06-12 RX ADMIN — FAMOTIDINE SCH MG: 10 INJECTION, SOLUTION INTRAVENOUS at 17:35

## 2024-06-13 VITALS
HEART RATE: 73 BPM | TEMPERATURE: 97.8 F | OXYGEN SATURATION: 98 % | SYSTOLIC BLOOD PRESSURE: 102 MMHG | DIASTOLIC BLOOD PRESSURE: 60 MMHG | RESPIRATION RATE: 18 BRPM

## 2024-06-13 VITALS
OXYGEN SATURATION: 100 % | RESPIRATION RATE: 18 BRPM | SYSTOLIC BLOOD PRESSURE: 156 MMHG | HEART RATE: 73 BPM | DIASTOLIC BLOOD PRESSURE: 78 MMHG | TEMPERATURE: 98.6 F

## 2024-06-13 VITALS
SYSTOLIC BLOOD PRESSURE: 156 MMHG | RESPIRATION RATE: 18 BRPM | HEART RATE: 73 BPM | OXYGEN SATURATION: 100 % | DIASTOLIC BLOOD PRESSURE: 78 MMHG | TEMPERATURE: 98.6 F

## 2024-06-13 LAB
ALBUMIN SERPL-MCNC: 3.7 G/DL (ref 3.5–5)
ALBUMIN/GLOB SERPL: 1.2 {RATIO} (ref 0.8–2)
ALP SERPL-CCNC: 74 IU/L (ref 40–150)
ALT SERPL-CCNC: 14 IU/L (ref 0–55)
ANION GAP SERPL CALC-SCNC: 17 MMOL/L (ref 8–16)
BASOPHILS # BLD AUTO: 0.1 10*3/UL (ref 0–0.1)
BASOPHILS NFR BLD AUTO: 1.2 % (ref 0–1)
BILIRUB SERPL-MCNC: 0.3 MG/DL (ref 0.2–1.2)
BUN SERPL-MCNC: 7 MG/DL (ref 7–26)
BUN/CREAT SERPL: 11 (ref 6–25)
CALCIUM SERPL-MCNC: 8.8 MG/DL (ref 8.4–10.2)
CHLORIDE SERPL-SCNC: 99 MMOL/L (ref 98–107)
CHOLEST SERPL-MCNC: 116 MD/DL (ref 0–199)
CHOLEST/HDLC SERPL: 1.8 {RATIO} (ref 3.9–4.7)
CK SERPL-CCNC: 160 IU/L (ref 30–200)
CK SERPL-CCNC: 231 IU/L (ref 30–200)
CO2 SERPL-SCNC: 18 MMOL/L (ref 22–29)
DEPRECATED NEUTROPHILS # BLD AUTO: 3.7 10*3/UL (ref 2.1–6.9)
EGFRCR SERPLBLD CKD-EPI 2021: 96 ML/MIN (ref 60–?)
EOSINOPHIL # BLD AUTO: 0.1 10*3/UL (ref 0–0.4)
EOSINOPHIL NFR BLD AUTO: 1.2 % (ref 0–6)
ERYTHROCYTE [DISTWIDTH] IN CORD BLOOD: 12.5 % (ref 11.7–14.4)
GLOBULIN PLAS-MCNC: 3.2 G/DL (ref 2.3–3.5)
GLUCOSE SERPLBLD-MCNC: 89 MG/DL (ref 74–118)
HCT VFR BLD AUTO: 35.1 % (ref 38.2–49.6)
HDLC SERPL-MSCNC: 66 MG/DL (ref 40–60)
HGB BLD-MCNC: 12.2 G/DL (ref 14–18)
LDLC SERPL CALC-MCNC: 39 MG/DL (ref 60–130)
LYMPHOCYTES # BLD: 1.8 10*3/UL (ref 1–3.2)
LYMPHOCYTES NFR BLD AUTO: 27.4 % (ref 18–39.1)
MCH RBC QN AUTO: 31.6 PG (ref 28–32)
MCHC RBC AUTO-ENTMCNC: 34.8 G/DL (ref 31–35)
MCV RBC AUTO: 90.9 FL (ref 81–99)
MONOCYTES # BLD AUTO: 0.9 10*3/UL (ref 0.2–0.8)
MONOCYTES NFR BLD AUTO: 13.4 % (ref 4.4–11.3)
NEUTS SEG NFR BLD AUTO: 56.5 % (ref 38.7–80)
OSMOLALITY SERPL CALC.SUM OF ELEC: 249 MOSM/KG (ref 278–305)
PLATELET # BLD AUTO: 215 X10E3/UL (ref 140–360)
POTASSIUM SERPL-SCNC: 4 MMOL/L (ref 3.5–5.1)
PROT SERPL-MCNC: 6.9 G/DL (ref 6.5–8.1)
RBC # BLD AUTO: 3.86 X10E6/UL (ref 4.3–5.7)
SODIUM SERPL-SCNC: 124 MMOL/L (ref 136–145)
SODIUM SERPL-SCNC: 130 MMOL/L (ref 136–145)
TRIGL SERPL-MCNC: 56 MG/DL (ref 0–149)
TROPONIN I SERPL DL<=0.01 NG/ML-MCNC: 0 NG/ML (ref 0–0.3)
TROPONIN I SERPL DL<=0.01 NG/ML-MCNC: 0 NG/ML (ref 0–0.3)
WBC # BLD: 6.49 X10E3/UL (ref 4.8–10.8)

## 2024-06-13 RX ADMIN — ASPIRIN 81 MG CHEWABLE TABLET SCH MG: 81 TABLET CHEWABLE at 10:35

## 2024-06-13 RX ADMIN — PREDNISONE SCH MG: 20 TABLET ORAL at 10:34

## 2024-06-13 RX ADMIN — DOCUSATE SODIUM SCH MG: 100 TABLET, FILM COATED ORAL at 10:34

## 2024-06-13 RX ADMIN — Medication SCH MG: at 10:34

## 2024-06-13 RX ADMIN — METOPROLOL SUCCINATE SCH MG: 25 TABLET, EXTENDED RELEASE ORAL at 10:35

## 2024-06-13 RX ADMIN — RISPERIDONE PRN MG: 0.5 TABLET ORAL at 02:37

## 2024-06-13 RX ADMIN — AMLODIPINE BESYLATE SCH MG: 10 TABLET ORAL at 21:00

## 2024-06-13 RX ADMIN — ZIPRASIDONE MESYLATE PRN MG: 20 INJECTION, POWDER, LYOPHILIZED, FOR SOLUTION INTRAMUSCULAR at 00:31

## 2024-06-14 VITALS
SYSTOLIC BLOOD PRESSURE: 175 MMHG | HEART RATE: 79 BPM | TEMPERATURE: 97.9 F | OXYGEN SATURATION: 99 % | RESPIRATION RATE: 15 BRPM | DIASTOLIC BLOOD PRESSURE: 84 MMHG

## 2024-06-14 VITALS
RESPIRATION RATE: 16 BRPM | TEMPERATURE: 98.4 F | DIASTOLIC BLOOD PRESSURE: 59 MMHG | OXYGEN SATURATION: 100 % | SYSTOLIC BLOOD PRESSURE: 112 MMHG | HEART RATE: 63 BPM

## 2024-06-14 VITALS
HEART RATE: 63 BPM | TEMPERATURE: 97.9 F | DIASTOLIC BLOOD PRESSURE: 64 MMHG | OXYGEN SATURATION: 99 % | RESPIRATION RATE: 15 BRPM | SYSTOLIC BLOOD PRESSURE: 155 MMHG

## 2024-06-14 VITALS
RESPIRATION RATE: 18 BRPM | OXYGEN SATURATION: 100 % | SYSTOLIC BLOOD PRESSURE: 126 MMHG | HEART RATE: 58 BPM | DIASTOLIC BLOOD PRESSURE: 65 MMHG | TEMPERATURE: 98.3 F

## 2024-06-14 VITALS
OXYGEN SATURATION: 99 % | HEART RATE: 59 BPM | TEMPERATURE: 98.3 F | SYSTOLIC BLOOD PRESSURE: 166 MMHG | RESPIRATION RATE: 20 BRPM | DIASTOLIC BLOOD PRESSURE: 70 MMHG

## 2024-06-14 VITALS
TEMPERATURE: 98.2 F | DIASTOLIC BLOOD PRESSURE: 88 MMHG | SYSTOLIC BLOOD PRESSURE: 156 MMHG | RESPIRATION RATE: 18 BRPM | HEART RATE: 81 BPM | OXYGEN SATURATION: 99 %

## 2024-06-14 RX ADMIN — SODIUM CHLORIDE ONE MLS/HR: 900 INJECTION, SOLUTION INTRAVENOUS at 08:18

## 2024-06-15 VITALS
OXYGEN SATURATION: 100 % | DIASTOLIC BLOOD PRESSURE: 63 MMHG | HEART RATE: 67 BPM | SYSTOLIC BLOOD PRESSURE: 131 MMHG | TEMPERATURE: 98.2 F | RESPIRATION RATE: 20 BRPM

## 2024-06-15 VITALS
RESPIRATION RATE: 18 BRPM | OXYGEN SATURATION: 96 % | DIASTOLIC BLOOD PRESSURE: 65 MMHG | HEART RATE: 86 BPM | SYSTOLIC BLOOD PRESSURE: 183 MMHG | TEMPERATURE: 98.1 F

## 2024-06-15 VITALS
RESPIRATION RATE: 20 BRPM | DIASTOLIC BLOOD PRESSURE: 56 MMHG | SYSTOLIC BLOOD PRESSURE: 126 MMHG | OXYGEN SATURATION: 100 % | TEMPERATURE: 98 F | HEART RATE: 72 BPM

## 2024-06-15 LAB
ANION GAP SERPL CALC-SCNC: 15.6 MMOL/L (ref 8–16)
BASOPHILS # BLD AUTO: 0.1 10*3/UL (ref 0–0.1)
BASOPHILS NFR BLD AUTO: 1.3 % (ref 0–1)
BUN SERPL-MCNC: 13 MG/DL (ref 7–26)
BUN/CREAT SERPL: 17 (ref 6–25)
CALCIUM SERPL-MCNC: 8 MG/DL (ref 8.4–10.2)
CHLORIDE SERPL-SCNC: 94 MMOL/L (ref 98–107)
CO2 SERPL-SCNC: 22 MMOL/L (ref 22–29)
DEPRECATED NEUTROPHILS # BLD AUTO: 4 10*3/UL (ref 2.1–6.9)
EGFRCR SERPLBLD CKD-EPI 2021: 92 ML/MIN (ref 60–?)
EOSINOPHIL # BLD AUTO: 0 10*3/UL (ref 0–0.4)
EOSINOPHIL NFR BLD AUTO: 0.4 % (ref 0–6)
ERYTHROCYTE [DISTWIDTH] IN CORD BLOOD: 12.7 % (ref 11.7–14.4)
GLUCOSE SERPLBLD-MCNC: 141 MG/DL (ref 74–118)
HCT VFR BLD AUTO: 34.7 % (ref 38.2–49.6)
HGB BLD-MCNC: 12.4 G/DL (ref 14–18)
LYMPHOCYTES # BLD: 1 10*3/UL (ref 1–3.2)
LYMPHOCYTES NFR BLD AUTO: 17.1 % (ref 18–39.1)
MCH RBC QN AUTO: 32.2 PG (ref 28–32)
MCHC RBC AUTO-ENTMCNC: 35.7 G/DL (ref 31–35)
MCV RBC AUTO: 90.1 FL (ref 81–99)
MONOCYTES # BLD AUTO: 0.5 10*3/UL (ref 0.2–0.8)
MONOCYTES NFR BLD AUTO: 8.3 % (ref 4.4–11.3)
NEUTS SEG NFR BLD AUTO: 72.5 % (ref 38.7–80)
PLATELET # BLD AUTO: 218 X10E3/UL (ref 140–360)
POTASSIUM SERPL-SCNC: 4.6 MMOL/L (ref 3.5–5.1)
RBC # BLD AUTO: 3.85 X10E6/UL (ref 4.3–5.7)
SODIUM SERPL-SCNC: 127 MMOL/L (ref 136–145)
WBC # BLD: 5.56 X10E3/UL (ref 4.8–10.8)

## 2024-06-15 RX ADMIN — AZITHROMYCIN SCH MG: 250 TABLET, FILM COATED ORAL at 17:22
